# Patient Record
Sex: MALE | Race: WHITE | Employment: OTHER | URBAN - METROPOLITAN AREA
[De-identification: names, ages, dates, MRNs, and addresses within clinical notes are randomized per-mention and may not be internally consistent; named-entity substitution may affect disease eponyms.]

---

## 2017-09-11 ENCOUNTER — HOSPITAL ENCOUNTER (OUTPATIENT)
Age: 67
Discharge: HOME | End: 2017-09-11
Payer: MEDICARE

## 2017-09-11 LAB
ALBUMIN SERPL-MCNC: 4.4 G/DL (ref 3.5–5.2)
ALBUMIN/GLOBULIN RATIO: ABNORMAL (ref 1–2.5)
ALP BLD-CCNC: 59 U/L (ref 40–129)
ALT SERPL-CCNC: 7 U/L (ref 5–41)
ANION GAP SERPL CALCULATED.3IONS-SCNC: 15 MMOL/L (ref 9–17)
AST SERPL-CCNC: 13 U/L
BILIRUB SERPL-MCNC: 0.28 MG/DL (ref 0.3–1.2)
BUN BLDV-MCNC: 16 MG/DL (ref 8–23)
BUN/CREAT BLD: ABNORMAL (ref 9–20)
CALCIUM SERPL-MCNC: 9.5 MG/DL (ref 8.6–10.4)
CHLORIDE BLD-SCNC: 102 MMOL/L (ref 98–107)
CO2: 25 MMOL/L (ref 20–31)
CREAT SERPL-MCNC: 0.61 MG/DL (ref 0.7–1.2)
GFR AFRICAN AMERICAN: >60 ML/MIN
GFR NON-AFRICAN AMERICAN: >60 ML/MIN
GFR SERPL CREATININE-BSD FRML MDRD: ABNORMAL ML/MIN/{1.73_M2}
GFR SERPL CREATININE-BSD FRML MDRD: ABNORMAL ML/MIN/{1.73_M2}
GLUCOSE BLD-MCNC: 112 MG/DL (ref 70–99)
POTASSIUM SERPL-SCNC: 4.2 MMOL/L (ref 3.7–5.3)
SODIUM BLD-SCNC: 142 MMOL/L (ref 135–144)
TOTAL PROTEIN: 7.1 G/DL (ref 6.4–8.3)

## 2017-09-11 PROCEDURE — 36415 COLL VENOUS BLD VENIPUNCTURE: CPT

## 2017-09-11 PROCEDURE — 80053 COMPREHEN METABOLIC PANEL: CPT

## 2017-09-17 ENCOUNTER — APPOINTMENT (OUTPATIENT)
Dept: GENERAL RADIOLOGY | Age: 67
End: 2017-09-17
Payer: MEDICARE

## 2017-09-17 ENCOUNTER — HOSPITAL ENCOUNTER (OUTPATIENT)
Age: 67
Setting detail: OBSERVATION
Discharge: HOME | End: 2017-09-18
Attending: EMERGENCY MEDICINE | Admitting: INTERNAL MEDICINE
Payer: MEDICARE

## 2017-09-17 DIAGNOSIS — R07.9 CHEST PAIN, UNSPECIFIED TYPE: Primary | ICD-10-CM

## 2017-09-17 DIAGNOSIS — N17.9 AKI (ACUTE KIDNEY INJURY) (HCC): ICD-10-CM

## 2017-09-17 LAB
ABSOLUTE EOS #: 0.3 K/UL (ref 0–0.4)
ABSOLUTE LYMPH #: 2.7 K/UL (ref 1–4.8)
ABSOLUTE MONO #: 1.1 K/UL (ref 0.1–1.3)
ALBUMIN SERPL-MCNC: 4.6 G/DL (ref 3.5–5.2)
ALBUMIN/GLOBULIN RATIO: ABNORMAL (ref 1–2.5)
ALP BLD-CCNC: 63 U/L (ref 40–129)
ALT SERPL-CCNC: 7 U/L (ref 5–41)
ANION GAP SERPL CALCULATED.3IONS-SCNC: 17 MMOL/L (ref 9–17)
AST SERPL-CCNC: 12 U/L
BASOPHILS # BLD: 1 %
BASOPHILS ABSOLUTE: 0.1 K/UL (ref 0–0.2)
BILIRUB SERPL-MCNC: 0.53 MG/DL (ref 0.3–1.2)
BUN BLDV-MCNC: 32 MG/DL (ref 8–23)
BUN/CREAT BLD: ABNORMAL (ref 9–20)
CALCIUM SERPL-MCNC: 10.3 MG/DL (ref 8.6–10.4)
CHLORIDE BLD-SCNC: 104 MMOL/L (ref 98–107)
CO2: 24 MMOL/L (ref 20–31)
CREAT SERPL-MCNC: 1.53 MG/DL (ref 0.7–1.2)
DIFFERENTIAL TYPE: ABNORMAL
EOSINOPHILS RELATIVE PERCENT: 3 %
GFR AFRICAN AMERICAN: 55 ML/MIN
GFR NON-AFRICAN AMERICAN: 46 ML/MIN
GFR SERPL CREATININE-BSD FRML MDRD: ABNORMAL ML/MIN/{1.73_M2}
GFR SERPL CREATININE-BSD FRML MDRD: ABNORMAL ML/MIN/{1.73_M2}
GLUCOSE BLD-MCNC: 202 MG/DL (ref 75–110)
GLUCOSE BLD-MCNC: 99 MG/DL (ref 70–99)
HCT VFR BLD CALC: 44.6 % (ref 41–53)
HEMOGLOBIN: 15.3 G/DL (ref 13.5–17.5)
INR BLD: 1
LIPASE: 52 U/L (ref 13–60)
LYMPHOCYTES # BLD: 29 %
MCH RBC QN AUTO: 31.4 PG (ref 26–34)
MCHC RBC AUTO-ENTMCNC: 34.3 G/DL (ref 31–37)
MCV RBC AUTO: 91.6 FL (ref 80–100)
MONOCYTES # BLD: 11 %
PARTIAL THROMBOPLASTIN TIME: 25 SEC (ref 23–31)
PDW BLD-RTO: 15.6 % (ref 11.5–14.9)
PLATELET # BLD: 236 K/UL (ref 150–450)
PLATELET ESTIMATE: ABNORMAL
PMV BLD AUTO: 7.9 FL (ref 6–12)
POTASSIUM SERPL-SCNC: 4.1 MMOL/L (ref 3.7–5.3)
PROTHROMBIN TIME: 11 SEC (ref 9.7–12)
RBC # BLD: 4.87 M/UL (ref 4.5–5.9)
RBC # BLD: ABNORMAL 10*6/UL
SEG NEUTROPHILS: 56 %
SEGMENTED NEUTROPHILS ABSOLUTE COUNT: 5.4 K/UL (ref 1.3–9.1)
SODIUM BLD-SCNC: 145 MMOL/L (ref 135–144)
TOTAL PROTEIN: 7.5 G/DL (ref 6.4–8.3)
TROPONIN INTERP: NORMAL
TROPONIN T: <0.03 NG/ML
WBC # BLD: 9.6 K/UL (ref 3.5–11)
WBC # BLD: ABNORMAL 10*3/UL

## 2017-09-17 PROCEDURE — G0378 HOSPITAL OBSERVATION PER HR: HCPCS

## 2017-09-17 PROCEDURE — 85730 THROMBOPLASTIN TIME PARTIAL: CPT

## 2017-09-17 PROCEDURE — 99285 EMERGENCY DEPT VISIT HI MDM: CPT

## 2017-09-17 PROCEDURE — 85610 PROTHROMBIN TIME: CPT

## 2017-09-17 PROCEDURE — 93005 ELECTROCARDIOGRAM TRACING: CPT

## 2017-09-17 PROCEDURE — 85025 COMPLETE CBC W/AUTO DIFF WBC: CPT

## 2017-09-17 PROCEDURE — 6370000000 HC RX 637 (ALT 250 FOR IP): Performed by: INTERNAL MEDICINE

## 2017-09-17 PROCEDURE — 36415 COLL VENOUS BLD VENIPUNCTURE: CPT

## 2017-09-17 PROCEDURE — 71010 XR CHEST PORTABLE: CPT

## 2017-09-17 PROCEDURE — 84484 ASSAY OF TROPONIN QUANT: CPT

## 2017-09-17 PROCEDURE — 82947 ASSAY GLUCOSE BLOOD QUANT: CPT

## 2017-09-17 PROCEDURE — 6370000000 HC RX 637 (ALT 250 FOR IP): Performed by: EMERGENCY MEDICINE

## 2017-09-17 PROCEDURE — 2580000003 HC RX 258: Performed by: EMERGENCY MEDICINE

## 2017-09-17 PROCEDURE — 83690 ASSAY OF LIPASE: CPT

## 2017-09-17 PROCEDURE — 2580000003 HC RX 258: Performed by: INTERNAL MEDICINE

## 2017-09-17 PROCEDURE — 80053 COMPREHEN METABOLIC PANEL: CPT

## 2017-09-17 RX ORDER — 0.9 % SODIUM CHLORIDE 0.9 %
1000 INTRAVENOUS SOLUTION INTRAVENOUS ONCE
Status: COMPLETED | OUTPATIENT
Start: 2017-09-17 | End: 2017-09-17

## 2017-09-17 RX ORDER — GABAPENTIN 100 MG/1
100 CAPSULE ORAL 3 TIMES DAILY
Status: DISCONTINUED | OUTPATIENT
Start: 2017-09-17 | End: 2017-09-17

## 2017-09-17 RX ORDER — ASPIRIN 81 MG/1
324 TABLET, CHEWABLE ORAL ONCE
Status: COMPLETED | OUTPATIENT
Start: 2017-09-17 | End: 2017-09-17

## 2017-09-17 RX ORDER — ALBUTEROL SULFATE 90 UG/1
2 AEROSOL, METERED RESPIRATORY (INHALATION) EVERY 4 HOURS PRN
Status: DISCONTINUED | OUTPATIENT
Start: 2017-09-17 | End: 2017-09-18 | Stop reason: HOSPADM

## 2017-09-17 RX ORDER — SODIUM CHLORIDE 0.9 % (FLUSH) 0.9 %
10 SYRINGE (ML) INJECTION EVERY 12 HOURS SCHEDULED
Status: DISCONTINUED | OUTPATIENT
Start: 2017-09-17 | End: 2017-09-18 | Stop reason: HOSPADM

## 2017-09-17 RX ORDER — GABAPENTIN 400 MG/1
800 CAPSULE ORAL NIGHTLY
Status: DISCONTINUED | OUTPATIENT
Start: 2017-09-17 | End: 2017-09-18 | Stop reason: HOSPADM

## 2017-09-17 RX ORDER — DEXTROSE MONOHYDRATE 50 MG/ML
100 INJECTION, SOLUTION INTRAVENOUS PRN
Status: DISCONTINUED | OUTPATIENT
Start: 2017-09-17 | End: 2017-09-18 | Stop reason: HOSPADM

## 2017-09-17 RX ORDER — GEMFIBROZIL 600 MG/1
600 TABLET, FILM COATED ORAL 2 TIMES DAILY
Status: DISCONTINUED | OUTPATIENT
Start: 2017-09-17 | End: 2017-09-18 | Stop reason: HOSPADM

## 2017-09-17 RX ORDER — NITROGLYCERIN 0.4 MG/1
0.4 TABLET SUBLINGUAL EVERY 5 MIN PRN
Status: DISCONTINUED | OUTPATIENT
Start: 2017-09-17 | End: 2017-09-18 | Stop reason: HOSPADM

## 2017-09-17 RX ORDER — ACETAMINOPHEN 325 MG/1
650 TABLET ORAL EVERY 4 HOURS PRN
Status: DISCONTINUED | OUTPATIENT
Start: 2017-09-17 | End: 2017-09-18 | Stop reason: HOSPADM

## 2017-09-17 RX ORDER — ALBUTEROL SULFATE 2.5 MG/3ML
2.5 SOLUTION RESPIRATORY (INHALATION) EVERY 6 HOURS PRN
Status: DISCONTINUED | OUTPATIENT
Start: 2017-09-17 | End: 2017-09-18 | Stop reason: HOSPADM

## 2017-09-17 RX ORDER — ONDANSETRON 4 MG/1
4 TABLET, ORALLY DISINTEGRATING ORAL EVERY 8 HOURS PRN
Status: DISCONTINUED | OUTPATIENT
Start: 2017-09-17 | End: 2017-09-18 | Stop reason: HOSPADM

## 2017-09-17 RX ORDER — NICOTINE POLACRILEX 4 MG
15 LOZENGE BUCCAL PRN
Status: DISCONTINUED | OUTPATIENT
Start: 2017-09-17 | End: 2017-09-18 | Stop reason: HOSPADM

## 2017-09-17 RX ORDER — LISINOPRIL 10 MG/1
10 TABLET ORAL DAILY
Status: DISCONTINUED | OUTPATIENT
Start: 2017-09-18 | End: 2017-09-18

## 2017-09-17 RX ORDER — GABAPENTIN 300 MG/1
300 CAPSULE ORAL 2 TIMES DAILY
Status: DISCONTINUED | OUTPATIENT
Start: 2017-09-17 | End: 2017-09-17

## 2017-09-17 RX ORDER — TAMSULOSIN HYDROCHLORIDE 0.4 MG/1
0.4 CAPSULE ORAL DAILY
Status: DISCONTINUED | OUTPATIENT
Start: 2017-09-18 | End: 2017-09-18

## 2017-09-17 RX ORDER — SODIUM CHLORIDE 0.9 % (FLUSH) 0.9 %
10 SYRINGE (ML) INJECTION PRN
Status: DISCONTINUED | OUTPATIENT
Start: 2017-09-17 | End: 2017-09-18 | Stop reason: HOSPADM

## 2017-09-17 RX ORDER — ATENOLOL 50 MG/1
50 TABLET ORAL DAILY
Status: DISCONTINUED | OUTPATIENT
Start: 2017-09-18 | End: 2017-09-18

## 2017-09-17 RX ORDER — DEXTROSE MONOHYDRATE 25 G/50ML
12.5 INJECTION, SOLUTION INTRAVENOUS PRN
Status: DISCONTINUED | OUTPATIENT
Start: 2017-09-17 | End: 2017-09-18 | Stop reason: HOSPADM

## 2017-09-17 RX ORDER — GABAPENTIN 400 MG/1
400 CAPSULE ORAL DAILY
Status: DISCONTINUED | OUTPATIENT
Start: 2017-09-18 | End: 2017-09-18 | Stop reason: HOSPADM

## 2017-09-17 RX ORDER — SODIUM CHLORIDE 9 MG/ML
INJECTION, SOLUTION INTRAVENOUS CONTINUOUS
Status: DISCONTINUED | OUTPATIENT
Start: 2017-09-17 | End: 2017-09-18 | Stop reason: HOSPADM

## 2017-09-17 RX ADMIN — TAMSULOSIN HYDROCHLORIDE 0.4 MG: 0.4 CAPSULE ORAL at 23:50

## 2017-09-17 RX ADMIN — SODIUM CHLORIDE 1000 ML: 9 INJECTION, SOLUTION INTRAVENOUS at 18:45

## 2017-09-17 RX ADMIN — GABAPENTIN 800 MG: 400 CAPSULE ORAL at 23:49

## 2017-09-17 RX ADMIN — ASPIRIN 81 MG 324 MG: 81 TABLET ORAL at 18:20

## 2017-09-17 RX ADMIN — GEMFIBROZIL 600 MG: 600 TABLET, FILM COATED ORAL at 23:49

## 2017-09-17 RX ADMIN — SODIUM CHLORIDE: 9 INJECTION, SOLUTION INTRAVENOUS at 23:53

## 2017-09-17 RX ADMIN — ACETAMINOPHEN 650 MG: 325 TABLET ORAL at 23:54

## 2017-09-17 ASSESSMENT — ENCOUNTER SYMPTOMS
ABDOMINAL PAIN: 0
GASTROINTESTINAL NEGATIVE: 1
NAUSEA: 0
SHORTNESS OF BREATH: 0
COUGH: 0
BACK PAIN: 0
EYES NEGATIVE: 1
VOMITING: 0

## 2017-09-17 ASSESSMENT — PAIN SCALES - GENERAL
PAINLEVEL_OUTOF10: 0
PAINLEVEL_OUTOF10: 7

## 2017-09-18 ENCOUNTER — APPOINTMENT (OUTPATIENT)
Dept: NUCLEAR MEDICINE | Age: 67
End: 2017-09-18
Payer: MEDICARE

## 2017-09-18 VITALS
BODY MASS INDEX: 34.29 KG/M2 | HEIGHT: 71 IN | HEART RATE: 56 BPM | SYSTOLIC BLOOD PRESSURE: 125 MMHG | WEIGHT: 244.93 LBS | DIASTOLIC BLOOD PRESSURE: 80 MMHG | OXYGEN SATURATION: 98 % | RESPIRATION RATE: 16 BRPM | TEMPERATURE: 98.1 F

## 2017-09-18 LAB
EKG ATRIAL RATE: 62 BPM
EKG P AXIS: 79 DEGREES
EKG P-R INTERVAL: 278 MS
EKG Q-T INTERVAL: 406 MS
EKG QRS DURATION: 90 MS
EKG QTC CALCULATION (BAZETT): 412 MS
EKG R AXIS: 53 DEGREES
EKG T AXIS: 45 DEGREES
EKG VENTRICULAR RATE: 62 BPM
GLUCOSE BLD-MCNC: 102 MG/DL (ref 75–110)
GLUCOSE BLD-MCNC: 208 MG/DL (ref 75–110)
GLUCOSE BLD-MCNC: 87 MG/DL (ref 75–110)
LV EF: 60 %
LVEF MODALITY: NORMAL
TROPONIN INTERP: NORMAL
TROPONIN T: <0.03 NG/ML

## 2017-09-18 PROCEDURE — G0378 HOSPITAL OBSERVATION PER HR: HCPCS

## 2017-09-18 PROCEDURE — 96372 THER/PROPH/DIAG INJ SC/IM: CPT

## 2017-09-18 PROCEDURE — 2580000003 HC RX 258: Performed by: INTERNAL MEDICINE

## 2017-09-18 PROCEDURE — 36415 COLL VENOUS BLD VENIPUNCTURE: CPT

## 2017-09-18 PROCEDURE — 78452 HT MUSCLE IMAGE SPECT MULT: CPT

## 2017-09-18 PROCEDURE — 84484 ASSAY OF TROPONIN QUANT: CPT

## 2017-09-18 PROCEDURE — 6360000002 HC RX W HCPCS: Performed by: INTERNAL MEDICINE

## 2017-09-18 PROCEDURE — A9500 TC99M SESTAMIBI: HCPCS | Performed by: INTERNAL MEDICINE

## 2017-09-18 PROCEDURE — 82947 ASSAY GLUCOSE BLOOD QUANT: CPT

## 2017-09-18 PROCEDURE — 3430000000 HC RX DIAGNOSTIC RADIOPHARMACEUTICAL: Performed by: INTERNAL MEDICINE

## 2017-09-18 PROCEDURE — 93017 CV STRESS TEST TRACING ONLY: CPT

## 2017-09-18 PROCEDURE — 99220 PR INITIAL OBSERVATION CARE/DAY 70 MINUTES: CPT | Performed by: INTERNAL MEDICINE

## 2017-09-18 PROCEDURE — 6370000000 HC RX 637 (ALT 250 FOR IP): Performed by: INTERNAL MEDICINE

## 2017-09-18 RX ORDER — AMINOPHYLLINE DIHYDRATE 25 MG/ML
100 INJECTION, SOLUTION INTRAVENOUS
Status: DISCONTINUED | OUTPATIENT
Start: 2017-09-18 | End: 2017-09-18 | Stop reason: HOSPADM

## 2017-09-18 RX ORDER — ATENOLOL 50 MG/1
50 TABLET ORAL NIGHTLY
Status: DISCONTINUED | OUTPATIENT
Start: 2017-09-18 | End: 2017-09-18 | Stop reason: HOSPADM

## 2017-09-18 RX ORDER — NITROGLYCERIN 0.4 MG/1
0.4 TABLET SUBLINGUAL EVERY 5 MIN PRN
Status: ACTIVE | OUTPATIENT
Start: 2017-09-18 | End: 2017-09-18

## 2017-09-18 RX ORDER — ATENOLOL 50 MG/1
50 TABLET ORAL NIGHTLY
Status: DISCONTINUED | OUTPATIENT
Start: 2017-09-18 | End: 2017-09-18

## 2017-09-18 RX ORDER — SODIUM CHLORIDE 9 MG/ML
INJECTION, SOLUTION INTRAVENOUS ONCE
Status: DISCONTINUED | OUTPATIENT
Start: 2017-09-18 | End: 2017-09-18 | Stop reason: HOSPADM

## 2017-09-18 RX ORDER — METOPROLOL TARTRATE 5 MG/5ML
2.5 INJECTION INTRAVENOUS PRN
Status: ACTIVE | OUTPATIENT
Start: 2017-09-18 | End: 2017-09-18

## 2017-09-18 RX ORDER — LISINOPRIL 10 MG/1
10 TABLET ORAL DAILY
Status: DISCONTINUED | OUTPATIENT
Start: 2017-09-19 | End: 2017-09-18 | Stop reason: HOSPADM

## 2017-09-18 RX ORDER — SODIUM CHLORIDE 0.9 % (FLUSH) 0.9 %
10 SYRINGE (ML) INJECTION PRN
Status: ACTIVE | OUTPATIENT
Start: 2017-09-18 | End: 2017-09-18

## 2017-09-18 RX ORDER — TAMSULOSIN HYDROCHLORIDE 0.4 MG/1
0.4 CAPSULE ORAL NIGHTLY
Status: DISCONTINUED | OUTPATIENT
Start: 2017-09-18 | End: 2017-09-18 | Stop reason: HOSPADM

## 2017-09-18 RX ADMIN — GABAPENTIN 400 MG: 400 CAPSULE ORAL at 10:38

## 2017-09-18 RX ADMIN — REGADENOSON 0.4 MG: 0.08 INJECTION, SOLUTION INTRAVENOUS at 09:21

## 2017-09-18 RX ADMIN — ATENOLOL 50 MG: 50 TABLET ORAL at 00:00

## 2017-09-18 RX ADMIN — TETRAKIS(2-METHOXYISOBUTYLISOCYANIDE)COPPER(I) TETRAFLUOROBORATE 16.8 MILLICURIE: 1 INJECTION, POWDER, LYOPHILIZED, FOR SOLUTION INTRAVENOUS at 07:21

## 2017-09-18 RX ADMIN — ONDANSETRON 4 MG: 4 TABLET, ORALLY DISINTEGRATING ORAL at 00:25

## 2017-09-18 RX ADMIN — Medication 10 ML: at 08:45

## 2017-09-18 RX ADMIN — ENOXAPARIN SODIUM 30 MG: 30 INJECTION SUBCUTANEOUS at 00:09

## 2017-09-18 RX ADMIN — Medication 10 ML: at 09:21

## 2017-09-18 RX ADMIN — Medication 10 ML: at 07:22

## 2017-09-18 RX ADMIN — INSULIN LISPRO 1 UNITS: 100 INJECTION, SOLUTION INTRAVENOUS; SUBCUTANEOUS at 00:09

## 2017-09-18 RX ADMIN — GEMFIBROZIL 600 MG: 600 TABLET, FILM COATED ORAL at 10:38

## 2017-09-18 RX ADMIN — TETRAKIS(2-METHOXYISOBUTYLISOCYANIDE)COPPER(I) TETRAFLUOROBORATE 35.2 MILLICURIE: 1 INJECTION, POWDER, LYOPHILIZED, FOR SOLUTION INTRAVENOUS at 09:41

## 2017-09-18 ASSESSMENT — PAIN SCALES - GENERAL
PAINLEVEL_OUTOF10: 0
PAINLEVEL_OUTOF10: 0

## 2018-05-17 ENCOUNTER — APPOINTMENT (OUTPATIENT)
Dept: CARDIAC CATH/INVASIVE PROCEDURES | Age: 68
DRG: 287 | End: 2018-05-17
Payer: MEDICARE

## 2018-05-17 ENCOUNTER — APPOINTMENT (OUTPATIENT)
Dept: GENERAL RADIOLOGY | Age: 68
DRG: 287 | End: 2018-05-17
Payer: MEDICARE

## 2018-05-17 ENCOUNTER — HOSPITAL ENCOUNTER (INPATIENT)
Age: 68
LOS: 1 days | Discharge: HOME | DRG: 287 | End: 2018-05-18
Attending: EMERGENCY MEDICINE | Admitting: INTERNAL MEDICINE
Payer: MEDICARE

## 2018-05-17 DIAGNOSIS — I48.92 ATRIAL FLUTTER, UNSPECIFIED TYPE (HCC): ICD-10-CM

## 2018-05-17 DIAGNOSIS — R07.9 CHEST PAIN, UNSPECIFIED TYPE: Primary | ICD-10-CM

## 2018-05-17 PROBLEM — R07.89 OTHER CHEST PAIN: Status: ACTIVE | Noted: 2018-05-17

## 2018-05-17 LAB
ABSOLUTE EOS #: 0.46 K/UL (ref 0–0.44)
ABSOLUTE IMMATURE GRANULOCYTE: 0.03 K/UL (ref 0–0.3)
ABSOLUTE LYMPH #: 3.29 K/UL (ref 1.1–3.7)
ABSOLUTE MONO #: 0.89 K/UL (ref 0.1–1.2)
ANION GAP SERPL CALCULATED.3IONS-SCNC: 11 MMOL/L (ref 9–17)
BASOPHILS # BLD: 0 % (ref 0–2)
BASOPHILS ABSOLUTE: 0.03 K/UL (ref 0–0.2)
BUN BLDV-MCNC: 27 MG/DL (ref 8–23)
BUN/CREAT BLD: ABNORMAL (ref 9–20)
CALCIUM SERPL-MCNC: 8.6 MG/DL (ref 8.6–10.4)
CHLORIDE BLD-SCNC: 102 MMOL/L (ref 98–107)
CO2: 24 MMOL/L (ref 20–31)
CREAT SERPL-MCNC: 1.08 MG/DL (ref 0.7–1.2)
DIFFERENTIAL TYPE: ABNORMAL
EKG ATRIAL RATE: 277 BPM
EKG P AXIS: -101 DEGREES
EKG Q-T INTERVAL: 460 MS
EKG QRS DURATION: 94 MS
EKG QTC CALCULATION (BAZETT): 482 MS
EKG R AXIS: -51 DEGREES
EKG T AXIS: 27 DEGREES
EKG VENTRICULAR RATE: 66 BPM
EOSINOPHILS RELATIVE PERCENT: 5 % (ref 1–4)
GFR AFRICAN AMERICAN: >60 ML/MIN
GFR NON-AFRICAN AMERICAN: >60 ML/MIN
GFR SERPL CREATININE-BSD FRML MDRD: ABNORMAL ML/MIN/{1.73_M2}
GFR SERPL CREATININE-BSD FRML MDRD: ABNORMAL ML/MIN/{1.73_M2}
GLUCOSE BLD-MCNC: 101 MG/DL (ref 75–110)
GLUCOSE BLD-MCNC: 140 MG/DL (ref 70–99)
GLUCOSE BLD-MCNC: 156 MG/DL (ref 75–110)
GLUCOSE BLD-MCNC: 178 MG/DL (ref 75–110)
GLUCOSE BLD-MCNC: 81 MG/DL (ref 75–110)
HCT VFR BLD CALC: 43.5 % (ref 40.7–50.3)
HEMOGLOBIN: 14.2 G/DL (ref 13–17)
IMMATURE GRANULOCYTES: 0 %
INR BLD: 1.1
LV EF: 50 %
LVEF MODALITY: NORMAL
LYMPHOCYTES # BLD: 34 % (ref 24–43)
MCH RBC QN AUTO: 29.9 PG (ref 25.2–33.5)
MCHC RBC AUTO-ENTMCNC: 32.6 G/DL (ref 28.4–34.8)
MCV RBC AUTO: 91.6 FL (ref 82.6–102.9)
MONOCYTES # BLD: 9 % (ref 3–12)
NRBC AUTOMATED: 0 PER 100 WBC
PARTIAL THROMBOPLASTIN TIME: 21.3 SEC (ref 20.5–30.5)
PARTIAL THROMBOPLASTIN TIME: 30.3 SEC (ref 20.5–30.5)
PDW BLD-RTO: 13.5 % (ref 11.8–14.4)
PLATELET # BLD: 209 K/UL (ref 138–453)
PLATELET ESTIMATE: ABNORMAL
PMV BLD AUTO: 9.8 FL (ref 8.1–13.5)
POC TROPONIN I: 0.01 NG/ML (ref 0–0.1)
POC TROPONIN INTERP: NORMAL
POTASSIUM SERPL-SCNC: 3.4 MMOL/L (ref 3.7–5.3)
PROTHROMBIN TIME: 11.4 SEC (ref 9–12)
RBC # BLD: 4.75 M/UL (ref 4.21–5.77)
RBC # BLD: ABNORMAL 10*6/UL
SEG NEUTROPHILS: 52 % (ref 36–65)
SEGMENTED NEUTROPHILS ABSOLUTE COUNT: 5.11 K/UL (ref 1.5–8.1)
SODIUM BLD-SCNC: 137 MMOL/L (ref 135–144)
TROPONIN INTERP: NORMAL
TROPONIN INTERP: NORMAL
TROPONIN T: <0.03 NG/ML
TROPONIN T: <0.03 NG/ML
WBC # BLD: 9.8 K/UL (ref 3.5–11.3)
WBC # BLD: ABNORMAL 10*3/UL

## 2018-05-17 PROCEDURE — 36415 COLL VENOUS BLD VENIPUNCTURE: CPT

## 2018-05-17 PROCEDURE — 2580000003 HC RX 258: Performed by: NURSE PRACTITIONER

## 2018-05-17 PROCEDURE — 6370000000 HC RX 637 (ALT 250 FOR IP): Performed by: INTERNAL MEDICINE

## 2018-05-17 PROCEDURE — C1760 CLOSURE DEV, VASC: HCPCS

## 2018-05-17 PROCEDURE — 6360000002 HC RX W HCPCS: Performed by: EMERGENCY MEDICINE

## 2018-05-17 PROCEDURE — 6360000004 HC RX CONTRAST MEDICATION

## 2018-05-17 PROCEDURE — 6370000000 HC RX 637 (ALT 250 FOR IP): Performed by: NURSE PRACTITIONER

## 2018-05-17 PROCEDURE — 85025 COMPLETE CBC W/AUTO DIFF WBC: CPT

## 2018-05-17 PROCEDURE — B2111ZZ FLUOROSCOPY OF MULTIPLE CORONARY ARTERIES USING LOW OSMOLAR CONTRAST: ICD-10-PCS | Performed by: INTERNAL MEDICINE

## 2018-05-17 PROCEDURE — 93458 L HRT ARTERY/VENTRICLE ANGIO: CPT | Performed by: INTERNAL MEDICINE

## 2018-05-17 PROCEDURE — C1894 INTRO/SHEATH, NON-LASER: HCPCS

## 2018-05-17 PROCEDURE — 6370000000 HC RX 637 (ALT 250 FOR IP)

## 2018-05-17 PROCEDURE — 99285 EMERGENCY DEPT VISIT HI MDM: CPT

## 2018-05-17 PROCEDURE — 6360000002 HC RX W HCPCS

## 2018-05-17 PROCEDURE — 2580000003 HC RX 258: Performed by: EMERGENCY MEDICINE

## 2018-05-17 PROCEDURE — 80048 BASIC METABOLIC PNL TOTAL CA: CPT

## 2018-05-17 PROCEDURE — 93005 ELECTROCARDIOGRAM TRACING: CPT

## 2018-05-17 PROCEDURE — B2151ZZ FLUOROSCOPY OF LEFT HEART USING LOW OSMOLAR CONTRAST: ICD-10-PCS | Performed by: INTERNAL MEDICINE

## 2018-05-17 PROCEDURE — 71045 X-RAY EXAM CHEST 1 VIEW: CPT

## 2018-05-17 PROCEDURE — 93306 TTE W/DOPPLER COMPLETE: CPT

## 2018-05-17 PROCEDURE — 99222 1ST HOSP IP/OBS MODERATE 55: CPT | Performed by: INTERNAL MEDICINE

## 2018-05-17 PROCEDURE — 85610 PROTHROMBIN TIME: CPT

## 2018-05-17 PROCEDURE — 84484 ASSAY OF TROPONIN QUANT: CPT

## 2018-05-17 PROCEDURE — C1725 CATH, TRANSLUMIN NON-LASER: HCPCS

## 2018-05-17 PROCEDURE — 1200000000 HC SEMI PRIVATE

## 2018-05-17 PROCEDURE — 99406 BEHAV CHNG SMOKING 3-10 MIN: CPT

## 2018-05-17 PROCEDURE — 6360000002 HC RX W HCPCS: Performed by: INTERNAL MEDICINE

## 2018-05-17 PROCEDURE — 4A023N7 MEASUREMENT OF CARDIAC SAMPLING AND PRESSURE, LEFT HEART, PERCUTANEOUS APPROACH: ICD-10-PCS | Performed by: INTERNAL MEDICINE

## 2018-05-17 PROCEDURE — C1769 GUIDE WIRE: HCPCS

## 2018-05-17 PROCEDURE — 85730 THROMBOPLASTIN TIME PARTIAL: CPT

## 2018-05-17 PROCEDURE — 82947 ASSAY GLUCOSE BLOOD QUANT: CPT

## 2018-05-17 PROCEDURE — 2709999900 HC NON-CHARGEABLE SUPPLY

## 2018-05-17 PROCEDURE — 96374 THER/PROPH/DIAG INJ IV PUSH: CPT

## 2018-05-17 RX ORDER — GEMFIBROZIL 600 MG/1
600 TABLET, FILM COATED ORAL 2 TIMES DAILY
Status: DISCONTINUED | OUTPATIENT
Start: 2018-05-17 | End: 2018-05-18 | Stop reason: HOSPADM

## 2018-05-17 RX ORDER — ONDANSETRON 2 MG/ML
4 INJECTION INTRAMUSCULAR; INTRAVENOUS EVERY 6 HOURS PRN
Status: DISCONTINUED | OUTPATIENT
Start: 2018-05-17 | End: 2018-05-18 | Stop reason: HOSPADM

## 2018-05-17 RX ORDER — HEPARIN SODIUM 1000 [USP'U]/ML
4000 INJECTION, SOLUTION INTRAVENOUS; SUBCUTANEOUS ONCE
Status: COMPLETED | OUTPATIENT
Start: 2018-05-17 | End: 2018-05-17

## 2018-05-17 RX ORDER — LISINOPRIL 10 MG/1
10 TABLET ORAL DAILY
Status: DISCONTINUED | OUTPATIENT
Start: 2018-05-17 | End: 2018-05-18 | Stop reason: HOSPADM

## 2018-05-17 RX ORDER — GABAPENTIN 300 MG/1
300 CAPSULE ORAL 2 TIMES DAILY
Status: DISCONTINUED | OUTPATIENT
Start: 2018-05-17 | End: 2018-05-17

## 2018-05-17 RX ORDER — POTASSIUM CHLORIDE 20 MEQ/1
40 TABLET, EXTENDED RELEASE ORAL PRN
Status: DISCONTINUED | OUTPATIENT
Start: 2018-05-17 | End: 2018-05-18 | Stop reason: HOSPADM

## 2018-05-17 RX ORDER — HYDROCODONE BITARTRATE AND ACETAMINOPHEN 5; 325 MG/1; MG/1
1 TABLET ORAL EVERY 4 HOURS PRN
Status: DISCONTINUED | OUTPATIENT
Start: 2018-05-17 | End: 2018-05-18 | Stop reason: HOSPADM

## 2018-05-17 RX ORDER — HEPARIN SODIUM 1000 [USP'U]/ML
4000 INJECTION, SOLUTION INTRAVENOUS; SUBCUTANEOUS PRN
Status: DISCONTINUED | OUTPATIENT
Start: 2018-05-17 | End: 2018-05-17

## 2018-05-17 RX ORDER — POTASSIUM CHLORIDE 20MEQ/15ML
40 LIQUID (ML) ORAL PRN
Status: DISCONTINUED | OUTPATIENT
Start: 2018-05-17 | End: 2018-05-18 | Stop reason: HOSPADM

## 2018-05-17 RX ORDER — NICOTINE POLACRILEX 4 MG
15 LOZENGE BUCCAL PRN
Status: DISCONTINUED | OUTPATIENT
Start: 2018-05-17 | End: 2018-05-18 | Stop reason: HOSPADM

## 2018-05-17 RX ORDER — MAGNESIUM SULFATE 1 G/100ML
1 INJECTION INTRAVENOUS PRN
Status: DISCONTINUED | OUTPATIENT
Start: 2018-05-17 | End: 2018-05-18 | Stop reason: HOSPADM

## 2018-05-17 RX ORDER — ATENOLOL 50 MG/1
50 TABLET ORAL NIGHTLY
Status: DISCONTINUED | OUTPATIENT
Start: 2018-05-17 | End: 2018-05-18 | Stop reason: HOSPADM

## 2018-05-17 RX ORDER — MORPHINE SULFATE 4 MG/ML
2 INJECTION, SOLUTION INTRAMUSCULAR; INTRAVENOUS
Status: DISCONTINUED | OUTPATIENT
Start: 2018-05-17 | End: 2018-05-18 | Stop reason: HOSPADM

## 2018-05-17 RX ORDER — HEPARIN SODIUM 10000 [USP'U]/100ML
1000 INJECTION, SOLUTION INTRAVENOUS CONTINUOUS
Status: DISCONTINUED | OUTPATIENT
Start: 2018-05-17 | End: 2018-05-17

## 2018-05-17 RX ORDER — SODIUM CHLORIDE 0.9 % (FLUSH) 0.9 %
10 SYRINGE (ML) INJECTION EVERY 12 HOURS SCHEDULED
Status: DISCONTINUED | OUTPATIENT
Start: 2018-05-17 | End: 2018-05-18 | Stop reason: HOSPADM

## 2018-05-17 RX ORDER — SODIUM CHLORIDE 9 MG/ML
INJECTION, SOLUTION INTRAVENOUS CONTINUOUS
Status: CANCELLED | OUTPATIENT
Start: 2018-05-17 | End: 2018-05-20

## 2018-05-17 RX ORDER — TAMSULOSIN HYDROCHLORIDE 0.4 MG/1
0.4 CAPSULE ORAL DAILY
Status: DISCONTINUED | OUTPATIENT
Start: 2018-05-17 | End: 2018-05-17

## 2018-05-17 RX ORDER — SODIUM CHLORIDE 0.9 % (FLUSH) 0.9 %
10 SYRINGE (ML) INJECTION EVERY 12 HOURS SCHEDULED
Status: CANCELLED | OUTPATIENT
Start: 2018-05-17

## 2018-05-17 RX ORDER — SODIUM CHLORIDE 0.9 % (FLUSH) 0.9 %
10 SYRINGE (ML) INJECTION PRN
Status: DISCONTINUED | OUTPATIENT
Start: 2018-05-17 | End: 2018-05-18 | Stop reason: HOSPADM

## 2018-05-17 RX ORDER — POTASSIUM CHLORIDE 7.45 MG/ML
10 INJECTION INTRAVENOUS PRN
Status: DISCONTINUED | OUTPATIENT
Start: 2018-05-17 | End: 2018-05-18 | Stop reason: HOSPADM

## 2018-05-17 RX ORDER — TAMSULOSIN HYDROCHLORIDE 0.4 MG/1
0.4 CAPSULE ORAL 2 TIMES DAILY
Status: DISCONTINUED | OUTPATIENT
Start: 2018-05-17 | End: 2018-05-18 | Stop reason: HOSPADM

## 2018-05-17 RX ORDER — BISACODYL 10 MG
10 SUPPOSITORY, RECTAL RECTAL DAILY PRN
Status: DISCONTINUED | OUTPATIENT
Start: 2018-05-17 | End: 2018-05-18 | Stop reason: HOSPADM

## 2018-05-17 RX ORDER — SODIUM CHLORIDE 9 MG/ML
INJECTION, SOLUTION INTRAVENOUS CONTINUOUS
Status: DISCONTINUED | OUTPATIENT
Start: 2018-05-17 | End: 2018-05-18

## 2018-05-17 RX ORDER — GABAPENTIN 400 MG/1
400 CAPSULE ORAL 2 TIMES DAILY
Status: DISCONTINUED | OUTPATIENT
Start: 2018-05-17 | End: 2018-05-18 | Stop reason: HOSPADM

## 2018-05-17 RX ORDER — ATENOLOL 50 MG/1
50 TABLET ORAL DAILY
Status: DISCONTINUED | OUTPATIENT
Start: 2018-05-17 | End: 2018-05-17

## 2018-05-17 RX ORDER — ACETAMINOPHEN 325 MG/1
650 TABLET ORAL EVERY 4 HOURS PRN
Status: DISCONTINUED | OUTPATIENT
Start: 2018-05-17 | End: 2018-05-17 | Stop reason: SDUPTHER

## 2018-05-17 RX ORDER — HEPARIN SODIUM 1000 [USP'U]/ML
4000 INJECTION, SOLUTION INTRAVENOUS; SUBCUTANEOUS PRN
Status: DISCONTINUED | OUTPATIENT
Start: 2018-05-17 | End: 2018-05-18

## 2018-05-17 RX ORDER — HEPARIN SODIUM 1000 [USP'U]/ML
2000 INJECTION, SOLUTION INTRAVENOUS; SUBCUTANEOUS PRN
Status: DISCONTINUED | OUTPATIENT
Start: 2018-05-17 | End: 2018-05-17

## 2018-05-17 RX ORDER — SODIUM CHLORIDE 0.9 % (FLUSH) 0.9 %
10 SYRINGE (ML) INJECTION PRN
Status: CANCELLED | OUTPATIENT
Start: 2018-05-17

## 2018-05-17 RX ORDER — GABAPENTIN 400 MG/1
400 CAPSULE ORAL 2 TIMES DAILY
Status: DISCONTINUED | OUTPATIENT
Start: 2018-05-18 | End: 2018-05-17

## 2018-05-17 RX ORDER — MORPHINE SULFATE 4 MG/ML
4 INJECTION, SOLUTION INTRAMUSCULAR; INTRAVENOUS
Status: DISCONTINUED | OUTPATIENT
Start: 2018-05-17 | End: 2018-05-18 | Stop reason: HOSPADM

## 2018-05-17 RX ORDER — HEPARIN SODIUM 1000 [USP'U]/ML
2000 INJECTION, SOLUTION INTRAVENOUS; SUBCUTANEOUS PRN
Status: DISCONTINUED | OUTPATIENT
Start: 2018-05-17 | End: 2018-05-18

## 2018-05-17 RX ORDER — 0.9 % SODIUM CHLORIDE 0.9 %
500 INTRAVENOUS SOLUTION INTRAVENOUS ONCE
Status: COMPLETED | OUTPATIENT
Start: 2018-05-17 | End: 2018-05-17

## 2018-05-17 RX ORDER — DEXTROSE MONOHYDRATE 50 MG/ML
100 INJECTION, SOLUTION INTRAVENOUS PRN
Status: DISCONTINUED | OUTPATIENT
Start: 2018-05-17 | End: 2018-05-18 | Stop reason: HOSPADM

## 2018-05-17 RX ORDER — ASPIRIN 81 MG/1
81 TABLET, CHEWABLE ORAL DAILY
Status: DISCONTINUED | OUTPATIENT
Start: 2018-05-17 | End: 2018-05-18 | Stop reason: HOSPADM

## 2018-05-17 RX ORDER — VARENICLINE TARTRATE 0.5 MG/1
1 TABLET, FILM COATED ORAL 2 TIMES DAILY
Status: DISCONTINUED | OUTPATIENT
Start: 2018-05-17 | End: 2018-05-18 | Stop reason: HOSPADM

## 2018-05-17 RX ORDER — TAMSULOSIN HYDROCHLORIDE 0.4 MG/1
0.4 CAPSULE ORAL 2 TIMES DAILY
Status: DISCONTINUED | OUTPATIENT
Start: 2018-05-18 | End: 2018-05-17

## 2018-05-17 RX ORDER — DEXTROSE MONOHYDRATE 25 G/50ML
12.5 INJECTION, SOLUTION INTRAVENOUS PRN
Status: DISCONTINUED | OUTPATIENT
Start: 2018-05-17 | End: 2018-05-18 | Stop reason: HOSPADM

## 2018-05-17 RX ORDER — DOCUSATE SODIUM 100 MG/1
100 CAPSULE, LIQUID FILLED ORAL 2 TIMES DAILY
Status: DISCONTINUED | OUTPATIENT
Start: 2018-05-17 | End: 2018-05-18 | Stop reason: HOSPADM

## 2018-05-17 RX ORDER — ACETAMINOPHEN 325 MG/1
650 TABLET ORAL EVERY 4 HOURS PRN
Status: CANCELLED | OUTPATIENT
Start: 2018-05-17

## 2018-05-17 RX ORDER — NITROGLYCERIN 0.4 MG/1
0.4 TABLET SUBLINGUAL EVERY 5 MIN PRN
Status: DISCONTINUED | OUTPATIENT
Start: 2018-05-17 | End: 2018-05-18 | Stop reason: HOSPADM

## 2018-05-17 RX ORDER — ACETAMINOPHEN 325 MG/1
650 TABLET ORAL EVERY 4 HOURS PRN
Status: DISCONTINUED | OUTPATIENT
Start: 2018-05-17 | End: 2018-05-18 | Stop reason: HOSPADM

## 2018-05-17 RX ADMIN — GEMFIBROZIL 600 MG: 600 TABLET ORAL at 21:00

## 2018-05-17 RX ADMIN — GEMFIBROZIL 600 MG: 600 TABLET ORAL at 09:15

## 2018-05-17 RX ADMIN — VARENICLINE TARTRATE 1 MG: 0.5 TABLET, FILM COATED ORAL at 21:00

## 2018-05-17 RX ADMIN — HEPARIN SODIUM 2000 UNITS: 1000 INJECTION, SOLUTION INTRAVENOUS; SUBCUTANEOUS at 12:19

## 2018-05-17 RX ADMIN — DOCUSATE SODIUM 100 MG: 100 CAPSULE ORAL at 09:14

## 2018-05-17 RX ADMIN — HEPARIN SODIUM AND DEXTROSE 10 ML/HR: 10000; 5 INJECTION INTRAVENOUS at 11:30

## 2018-05-17 RX ADMIN — SODIUM CHLORIDE: 9 INJECTION, SOLUTION INTRAVENOUS at 04:56

## 2018-05-17 RX ADMIN — Medication 10 ML: at 21:03

## 2018-05-17 RX ADMIN — SODIUM CHLORIDE 500 ML: 9 INJECTION, SOLUTION INTRAVENOUS at 02:33

## 2018-05-17 RX ADMIN — METFORMIN HYDROCHLORIDE 500 MG: 500 TABLET ORAL at 09:14

## 2018-05-17 RX ADMIN — TAMSULOSIN HYDROCHLORIDE 0.4 MG: 0.4 CAPSULE ORAL at 09:14

## 2018-05-17 RX ADMIN — HEPARIN SODIUM 4000 UNITS: 1000 INJECTION, SOLUTION INTRAVENOUS; SUBCUTANEOUS at 02:49

## 2018-05-17 RX ADMIN — HEPARIN SODIUM AND DEXTROSE 1000 UNITS/HR: 10000; 5 INJECTION INTRAVENOUS at 02:50

## 2018-05-17 RX ADMIN — TAMSULOSIN HYDROCHLORIDE 0.4 MG: 0.4 CAPSULE ORAL at 22:54

## 2018-05-17 RX ADMIN — INSULIN LISPRO 1 UNITS: 100 INJECTION, SOLUTION INTRAVENOUS; SUBCUTANEOUS at 21:16

## 2018-05-17 RX ADMIN — ATENOLOL 50 MG: 50 TABLET ORAL at 21:00

## 2018-05-17 RX ADMIN — HYDROCODONE BITARTRATE AND ACETAMINOPHEN 1 TABLET: 5; 325 TABLET ORAL at 16:46

## 2018-05-17 RX ADMIN — GABAPENTIN 300 MG: 300 CAPSULE ORAL at 09:15

## 2018-05-17 RX ADMIN — GABAPENTIN 400 MG: 400 CAPSULE ORAL at 22:53

## 2018-05-17 RX ADMIN — MINERAL OIL, PETROLATUM 2 EACH: 425; 568 OINTMENT OPHTHALMIC at 09:15

## 2018-05-17 RX ADMIN — HYDROCODONE BITARTRATE AND ACETAMINOPHEN 1 TABLET: 5; 325 TABLET ORAL at 05:00

## 2018-05-17 RX ADMIN — MINERAL OIL, PETROLATUM 2 EACH: 425; 568 OINTMENT OPHTHALMIC at 21:01

## 2018-05-17 RX ADMIN — DOCUSATE SODIUM 100 MG: 100 CAPSULE ORAL at 21:00

## 2018-05-17 RX ADMIN — LISINOPRIL 10 MG: 10 TABLET ORAL at 09:14

## 2018-05-17 RX ADMIN — HYDROCODONE BITARTRATE AND ACETAMINOPHEN 1 TABLET: 5; 325 TABLET ORAL at 22:57

## 2018-05-17 ASSESSMENT — PAIN SCALES - GENERAL
PAINLEVEL_OUTOF10: 0
PAINLEVEL_OUTOF10: 0
PAINLEVEL_OUTOF10: 7
PAINLEVEL_OUTOF10: 8
PAINLEVEL_OUTOF10: 6
PAINLEVEL_OUTOF10: 8
PAINLEVEL_OUTOF10: 9

## 2018-05-17 ASSESSMENT — PAIN DESCRIPTION - ORIENTATION
ORIENTATION: RIGHT;LEFT
ORIENTATION: MID

## 2018-05-17 ASSESSMENT — ENCOUNTER SYMPTOMS
GASTROINTESTINAL NEGATIVE: 1
EYES NEGATIVE: 1
ALLERGIC/IMMUNOLOGIC NEGATIVE: 1
RESPIRATORY NEGATIVE: 1

## 2018-05-17 ASSESSMENT — PAIN DESCRIPTION - LOCATION
LOCATION: SHOULDER
LOCATION: CHEST
LOCATION: CHEST

## 2018-05-17 ASSESSMENT — PAIN DESCRIPTION - PAIN TYPE
TYPE: ACUTE PAIN
TYPE: ACUTE PAIN
TYPE: CHRONIC PAIN

## 2018-05-17 ASSESSMENT — PAIN DESCRIPTION - FREQUENCY: FREQUENCY: CONTINUOUS

## 2018-05-17 ASSESSMENT — PAIN DESCRIPTION - DESCRIPTORS: DESCRIPTORS: ACHING;CONSTANT

## 2018-05-18 VITALS
SYSTOLIC BLOOD PRESSURE: 154 MMHG | BODY MASS INDEX: 30.9 KG/M2 | RESPIRATION RATE: 18 BRPM | TEMPERATURE: 97.4 F | HEIGHT: 71 IN | HEART RATE: 59 BPM | WEIGHT: 220.68 LBS | OXYGEN SATURATION: 98 % | DIASTOLIC BLOOD PRESSURE: 52 MMHG

## 2018-05-18 PROBLEM — R07.89 OTHER CHEST PAIN: Status: RESOLVED | Noted: 2018-05-17 | Resolved: 2018-05-18

## 2018-05-18 LAB
ALBUMIN SERPL-MCNC: 3.3 G/DL (ref 3.5–5.2)
ALBUMIN/GLOBULIN RATIO: 1.2 (ref 1–2.5)
ALP BLD-CCNC: 52 U/L (ref 40–129)
ALT SERPL-CCNC: 7 U/L (ref 5–41)
ANION GAP SERPL CALCULATED.3IONS-SCNC: 9 MMOL/L (ref 9–17)
AST SERPL-CCNC: 15 U/L
BILIRUB SERPL-MCNC: 0.24 MG/DL (ref 0.3–1.2)
BNP INTERPRETATION: NORMAL
BUN BLDV-MCNC: 13 MG/DL (ref 8–23)
BUN/CREAT BLD: ABNORMAL (ref 9–20)
CALCIUM SERPL-MCNC: 8.1 MG/DL (ref 8.6–10.4)
CHLORIDE BLD-SCNC: 109 MMOL/L (ref 98–107)
CHOLESTEROL/HDL RATIO: 4.6
CHOLESTEROL: 130 MG/DL
CO2: 22 MMOL/L (ref 20–31)
CREAT SERPL-MCNC: 0.6 MG/DL (ref 0.7–1.2)
GFR AFRICAN AMERICAN: >60 ML/MIN
GFR NON-AFRICAN AMERICAN: >60 ML/MIN
GFR SERPL CREATININE-BSD FRML MDRD: ABNORMAL ML/MIN/{1.73_M2}
GFR SERPL CREATININE-BSD FRML MDRD: ABNORMAL ML/MIN/{1.73_M2}
GLUCOSE BLD-MCNC: 101 MG/DL (ref 70–99)
GLUCOSE BLD-MCNC: 114 MG/DL (ref 75–110)
GLUCOSE BLD-MCNC: 116 MG/DL (ref 75–110)
HCT VFR BLD CALC: 42.8 % (ref 40.7–50.3)
HDLC SERPL-MCNC: 28 MG/DL
HEMOGLOBIN: 13.4 G/DL (ref 13–17)
LDL CHOLESTEROL: 84 MG/DL (ref 0–130)
MAGNESIUM: 1.9 MG/DL (ref 1.6–2.6)
MCH RBC QN AUTO: 29.6 PG (ref 25.2–33.5)
MCHC RBC AUTO-ENTMCNC: 31.3 G/DL (ref 28.4–34.8)
MCV RBC AUTO: 94.7 FL (ref 82.6–102.9)
NRBC AUTOMATED: 0 PER 100 WBC
PDW BLD-RTO: 13.1 % (ref 11.8–14.4)
PLATELET # BLD: 177 K/UL (ref 138–453)
PMV BLD AUTO: 9.9 FL (ref 8.1–13.5)
POTASSIUM SERPL-SCNC: 4.3 MMOL/L (ref 3.7–5.3)
PRO-BNP: 137 PG/ML
RBC # BLD: 4.52 M/UL (ref 4.21–5.77)
SODIUM BLD-SCNC: 140 MMOL/L (ref 135–144)
TOTAL PROTEIN: 6 G/DL (ref 6.4–8.3)
TRIGL SERPL-MCNC: 91 MG/DL
VLDLC SERPL CALC-MCNC: ABNORMAL MG/DL (ref 1–30)
WBC # BLD: 7.2 K/UL (ref 3.5–11.3)

## 2018-05-18 PROCEDURE — 6370000000 HC RX 637 (ALT 250 FOR IP): Performed by: INTERNAL MEDICINE

## 2018-05-18 PROCEDURE — 36415 COLL VENOUS BLD VENIPUNCTURE: CPT

## 2018-05-18 PROCEDURE — 99239 HOSP IP/OBS DSCHRG MGMT >30: CPT | Performed by: INTERNAL MEDICINE

## 2018-05-18 PROCEDURE — 82947 ASSAY GLUCOSE BLOOD QUANT: CPT

## 2018-05-18 PROCEDURE — 6370000000 HC RX 637 (ALT 250 FOR IP): Performed by: NURSE PRACTITIONER

## 2018-05-18 PROCEDURE — 80053 COMPREHEN METABOLIC PANEL: CPT

## 2018-05-18 PROCEDURE — 85027 COMPLETE CBC AUTOMATED: CPT

## 2018-05-18 PROCEDURE — 83880 ASSAY OF NATRIURETIC PEPTIDE: CPT

## 2018-05-18 PROCEDURE — 80061 LIPID PANEL: CPT

## 2018-05-18 PROCEDURE — 83735 ASSAY OF MAGNESIUM: CPT

## 2018-05-18 RX ORDER — VARENICLINE TARTRATE 1 MG/1
1 TABLET, FILM COATED ORAL 2 TIMES DAILY
Qty: 60 TABLET | Refills: 1 | Status: SHIPPED | OUTPATIENT
Start: 2018-05-18 | End: 2019-02-20 | Stop reason: SDUPTHER

## 2018-05-18 RX ORDER — ASPIRIN 81 MG/1
81 TABLET, CHEWABLE ORAL DAILY
Qty: 30 TABLET | Refills: 3 | Status: SHIPPED | OUTPATIENT
Start: 2018-05-19 | End: 2019-02-20 | Stop reason: ALTCHOICE

## 2018-05-18 RX ADMIN — TAMSULOSIN HYDROCHLORIDE 0.4 MG: 0.4 CAPSULE ORAL at 08:18

## 2018-05-18 RX ADMIN — DOCUSATE SODIUM 100 MG: 100 CAPSULE ORAL at 08:18

## 2018-05-18 RX ADMIN — MINERAL OIL, PETROLATUM 2 EACH: 425; 568 OINTMENT OPHTHALMIC at 08:22

## 2018-05-18 RX ADMIN — VARENICLINE TARTRATE 1 MG: 0.5 TABLET, FILM COATED ORAL at 08:18

## 2018-05-18 RX ADMIN — LISINOPRIL 10 MG: 10 TABLET ORAL at 08:19

## 2018-05-18 RX ADMIN — ASPIRIN 81 MG: 81 TABLET, CHEWABLE ORAL at 08:18

## 2018-05-18 RX ADMIN — GABAPENTIN 400 MG: 400 CAPSULE ORAL at 08:18

## 2018-05-18 RX ADMIN — GEMFIBROZIL 600 MG: 600 TABLET ORAL at 08:18

## 2018-06-16 ENCOUNTER — HOSPITAL ENCOUNTER (EMERGENCY)
Age: 68
Discharge: HOME | End: 2018-06-16
Attending: EMERGENCY MEDICINE
Payer: MEDICARE

## 2018-06-16 ENCOUNTER — APPOINTMENT (OUTPATIENT)
Dept: GENERAL RADIOLOGY | Age: 68
End: 2018-06-16
Payer: MEDICARE

## 2018-06-16 VITALS
SYSTOLIC BLOOD PRESSURE: 148 MMHG | TEMPERATURE: 98.6 F | BODY MASS INDEX: 31.22 KG/M2 | DIASTOLIC BLOOD PRESSURE: 83 MMHG | RESPIRATION RATE: 14 BRPM | HEIGHT: 71 IN | WEIGHT: 223 LBS | OXYGEN SATURATION: 98 % | HEART RATE: 66 BPM

## 2018-06-16 DIAGNOSIS — I95.9 HYPOTENSION, UNSPECIFIED HYPOTENSION TYPE: ICD-10-CM

## 2018-06-16 DIAGNOSIS — E86.0 DEHYDRATION: Primary | ICD-10-CM

## 2018-06-16 DIAGNOSIS — R19.7 DIARRHEA, UNSPECIFIED TYPE: ICD-10-CM

## 2018-06-16 LAB
-: ABNORMAL
ABSOLUTE EOS #: 0.5 K/UL (ref 0–0.4)
ABSOLUTE IMMATURE GRANULOCYTE: ABNORMAL K/UL (ref 0–0.3)
ABSOLUTE LYMPH #: 3.5 K/UL (ref 1–4.8)
ABSOLUTE MONO #: 0.9 K/UL (ref 0.1–1.3)
ALBUMIN SERPL-MCNC: 4.5 G/DL (ref 3.5–5.2)
ALBUMIN/GLOBULIN RATIO: ABNORMAL (ref 1–2.5)
ALP BLD-CCNC: 70 U/L (ref 40–129)
ALT SERPL-CCNC: 9 U/L (ref 5–41)
AMORPHOUS: ABNORMAL
ANION GAP SERPL CALCULATED.3IONS-SCNC: 16 MMOL/L (ref 9–17)
AST SERPL-CCNC: 13 U/L
BACTERIA: ABNORMAL
BASOPHILS # BLD: 1 % (ref 0–2)
BASOPHILS ABSOLUTE: 0.1 K/UL (ref 0–0.2)
BILIRUB SERPL-MCNC: 0.37 MG/DL (ref 0.3–1.2)
BILIRUBIN URINE: NEGATIVE
BUN BLDV-MCNC: 32 MG/DL (ref 8–23)
BUN/CREAT BLD: ABNORMAL (ref 9–20)
CALCIUM SERPL-MCNC: 9.8 MG/DL (ref 8.6–10.4)
CASTS UA: ABNORMAL /LPF
CHLORIDE BLD-SCNC: 104 MMOL/L (ref 98–107)
CO2: 19 MMOL/L (ref 20–31)
COLOR: YELLOW
COMMENT UA: ABNORMAL
CREAT SERPL-MCNC: 0.89 MG/DL (ref 0.7–1.2)
CRYSTALS, UA: ABNORMAL /HPF
DIFFERENTIAL TYPE: ABNORMAL
EKG ATRIAL RATE: 60 BPM
EKG P AXIS: 69 DEGREES
EKG P-R INTERVAL: 312 MS
EKG Q-T INTERVAL: 412 MS
EKG QRS DURATION: 94 MS
EKG QTC CALCULATION (BAZETT): 412 MS
EKG R AXIS: 28 DEGREES
EKG T AXIS: 30 DEGREES
EKG VENTRICULAR RATE: 60 BPM
EOSINOPHILS RELATIVE PERCENT: 5 % (ref 0–4)
EPITHELIAL CELLS UA: ABNORMAL /HPF
GFR AFRICAN AMERICAN: >60 ML/MIN
GFR NON-AFRICAN AMERICAN: >60 ML/MIN
GFR SERPL CREATININE-BSD FRML MDRD: ABNORMAL ML/MIN/{1.73_M2}
GFR SERPL CREATININE-BSD FRML MDRD: ABNORMAL ML/MIN/{1.73_M2}
GLUCOSE BLD-MCNC: 117 MG/DL (ref 70–99)
GLUCOSE URINE: NEGATIVE
HCT VFR BLD CALC: 44.9 % (ref 41–53)
HEMOGLOBIN: 15.6 G/DL (ref 13.5–17.5)
IMMATURE GRANULOCYTES: ABNORMAL %
KETONES, URINE: NEGATIVE
LEUKOCYTE ESTERASE, URINE: NEGATIVE
LYMPHOCYTES # BLD: 35 % (ref 24–44)
MCH RBC QN AUTO: 30.8 PG (ref 26–34)
MCHC RBC AUTO-ENTMCNC: 34.7 G/DL (ref 31–37)
MCV RBC AUTO: 88.9 FL (ref 80–100)
MONOCYTES # BLD: 10 % (ref 1–7)
MUCUS: ABNORMAL
NITRITE, URINE: NEGATIVE
NRBC AUTOMATED: ABNORMAL PER 100 WBC
OTHER OBSERVATIONS UA: ABNORMAL
PDW BLD-RTO: 14.4 % (ref 11.5–14.9)
PH UA: 5.5 (ref 5–8)
PLATELET # BLD: 231 K/UL (ref 150–450)
PLATELET ESTIMATE: ABNORMAL
PMV BLD AUTO: 8.2 FL (ref 6–12)
POTASSIUM SERPL-SCNC: 4 MMOL/L (ref 3.7–5.3)
PROTEIN UA: NEGATIVE
RBC # BLD: 5.05 M/UL (ref 4.5–5.9)
RBC # BLD: ABNORMAL 10*6/UL
RBC UA: ABNORMAL /HPF
RENAL EPITHELIAL, UA: ABNORMAL /HPF
SEG NEUTROPHILS: 49 % (ref 36–66)
SEGMENTED NEUTROPHILS ABSOLUTE COUNT: 5 K/UL (ref 1.3–9.1)
SODIUM BLD-SCNC: 139 MMOL/L (ref 135–144)
SPECIFIC GRAVITY UA: 1.02 (ref 1–1.03)
TOTAL PROTEIN: 7.8 G/DL (ref 6.4–8.3)
TRICHOMONAS: ABNORMAL
TROPONIN INTERP: NORMAL
TROPONIN INTERP: NORMAL
TROPONIN T: <0.03 NG/ML
TROPONIN T: <0.03 NG/ML
TURBIDITY: CLEAR
URINE HGB: ABNORMAL
UROBILINOGEN, URINE: NORMAL
WBC # BLD: 9.9 K/UL (ref 3.5–11)
WBC # BLD: ABNORMAL 10*3/UL
WBC UA: ABNORMAL /HPF
YEAST: ABNORMAL

## 2018-06-16 PROCEDURE — 93005 ELECTROCARDIOGRAM TRACING: CPT

## 2018-06-16 PROCEDURE — 80053 COMPREHEN METABOLIC PANEL: CPT

## 2018-06-16 PROCEDURE — 99285 EMERGENCY DEPT VISIT HI MDM: CPT

## 2018-06-16 PROCEDURE — 74022 RADEX COMPL AQT ABD SERIES: CPT

## 2018-06-16 PROCEDURE — 6370000000 HC RX 637 (ALT 250 FOR IP): Performed by: EMERGENCY MEDICINE

## 2018-06-16 PROCEDURE — 96361 HYDRATE IV INFUSION ADD-ON: CPT

## 2018-06-16 PROCEDURE — 84484 ASSAY OF TROPONIN QUANT: CPT

## 2018-06-16 PROCEDURE — 81001 URINALYSIS AUTO W/SCOPE: CPT

## 2018-06-16 PROCEDURE — 96360 HYDRATION IV INFUSION INIT: CPT

## 2018-06-16 PROCEDURE — 36415 COLL VENOUS BLD VENIPUNCTURE: CPT

## 2018-06-16 PROCEDURE — 2580000003 HC RX 258: Performed by: EMERGENCY MEDICINE

## 2018-06-16 PROCEDURE — 85025 COMPLETE CBC W/AUTO DIFF WBC: CPT

## 2018-06-16 RX ORDER — IBUPROFEN 800 MG/1
800 TABLET ORAL ONCE
Status: COMPLETED | OUTPATIENT
Start: 2018-06-16 | End: 2018-06-16

## 2018-06-16 RX ORDER — 0.9 % SODIUM CHLORIDE 0.9 %
1000 INTRAVENOUS SOLUTION INTRAVENOUS ONCE
Status: COMPLETED | OUTPATIENT
Start: 2018-06-16 | End: 2018-06-16

## 2018-06-16 RX ADMIN — SODIUM CHLORIDE 1000 ML: 9 INJECTION, SOLUTION INTRAVENOUS at 20:18

## 2018-06-16 RX ADMIN — IBUPROFEN 800 MG: 800 TABLET ORAL at 21:09

## 2018-06-16 ASSESSMENT — ENCOUNTER SYMPTOMS
SORE THROAT: 0
VOMITING: 0
ABDOMINAL PAIN: 0
SHORTNESS OF BREATH: 0
DIARRHEA: 0
BACK PAIN: 0
NAUSEA: 0
EYE PAIN: 0
COUGH: 0

## 2018-06-16 ASSESSMENT — PAIN DESCRIPTION - PAIN TYPE: TYPE: CHRONIC PAIN

## 2018-06-16 ASSESSMENT — PAIN DESCRIPTION - LOCATION: LOCATION: SHOULDER

## 2018-06-16 ASSESSMENT — PAIN DESCRIPTION - ORIENTATION: ORIENTATION: RIGHT;LEFT

## 2018-06-16 ASSESSMENT — PAIN DESCRIPTION - DESCRIPTORS: DESCRIPTORS: ACHING

## 2018-06-16 ASSESSMENT — PAIN SCALES - GENERAL: PAINLEVEL_OUTOF10: 7

## 2018-06-16 ASSESSMENT — PAIN DESCRIPTION - ONSET: ONSET: ON-GOING

## 2019-02-03 ENCOUNTER — OFFICE VISIT (OUTPATIENT)
Dept: FAMILY MEDICINE CLINIC | Age: 69
End: 2019-02-03
Payer: MEDICARE

## 2019-02-03 VITALS
DIASTOLIC BLOOD PRESSURE: 68 MMHG | OXYGEN SATURATION: 98 % | HEART RATE: 62 BPM | HEIGHT: 70 IN | WEIGHT: 227.8 LBS | TEMPERATURE: 98.3 F | SYSTOLIC BLOOD PRESSURE: 132 MMHG | BODY MASS INDEX: 32.61 KG/M2 | RESPIRATION RATE: 16 BRPM

## 2019-02-03 DIAGNOSIS — K04.7 DENTAL ABSCESS: Primary | ICD-10-CM

## 2019-02-03 PROCEDURE — 3017F COLORECTAL CA SCREEN DOC REV: CPT | Performed by: INTERNAL MEDICINE

## 2019-02-03 PROCEDURE — G8599 NO ASA/ANTIPLAT THER USE RNG: HCPCS | Performed by: INTERNAL MEDICINE

## 2019-02-03 PROCEDURE — 1101F PT FALLS ASSESS-DOCD LE1/YR: CPT | Performed by: INTERNAL MEDICINE

## 2019-02-03 PROCEDURE — 4040F PNEUMOC VAC/ADMIN/RCVD: CPT | Performed by: INTERNAL MEDICINE

## 2019-02-03 PROCEDURE — G8484 FLU IMMUNIZE NO ADMIN: HCPCS | Performed by: INTERNAL MEDICINE

## 2019-02-03 PROCEDURE — 1123F ACP DISCUSS/DSCN MKR DOCD: CPT | Performed by: INTERNAL MEDICINE

## 2019-02-03 PROCEDURE — 99213 OFFICE O/P EST LOW 20 MIN: CPT | Performed by: INTERNAL MEDICINE

## 2019-02-03 PROCEDURE — G8427 DOCREV CUR MEDS BY ELIG CLIN: HCPCS | Performed by: INTERNAL MEDICINE

## 2019-02-03 PROCEDURE — G8417 CALC BMI ABV UP PARAM F/U: HCPCS | Performed by: INTERNAL MEDICINE

## 2019-02-03 PROCEDURE — 1036F TOBACCO NON-USER: CPT | Performed by: INTERNAL MEDICINE

## 2019-02-03 RX ORDER — AMOXICILLIN AND CLAVULANATE POTASSIUM 875; 125 MG/1; MG/1
1 TABLET, FILM COATED ORAL 2 TIMES DAILY
Qty: 20 TABLET | Refills: 0 | Status: SHIPPED | OUTPATIENT
Start: 2019-02-03 | End: 2019-02-13

## 2019-02-03 ASSESSMENT — PATIENT HEALTH QUESTIONNAIRE - PHQ9
SUM OF ALL RESPONSES TO PHQ9 QUESTIONS 1 & 2: 0
SUM OF ALL RESPONSES TO PHQ QUESTIONS 1-9: 0
2. FEELING DOWN, DEPRESSED OR HOPELESS: 0
1. LITTLE INTEREST OR PLEASURE IN DOING THINGS: 0
SUM OF ALL RESPONSES TO PHQ QUESTIONS 1-9: 0

## 2019-02-03 ASSESSMENT — ENCOUNTER SYMPTOMS
SHORTNESS OF BREATH: 0
COUGH: 0

## 2019-02-07 LAB
AVERAGE GLUCOSE: NORMAL
CREATININE, URINE: NORMAL
HBA1C MFR BLD: 6 %
MICROALBUMIN/CREAT 24H UR: 2.4 MG/G{CREAT}
MICROALBUMIN/CREAT UR-RTO: NORMAL

## 2019-02-20 PROBLEM — Z79.899 LONG TERM USE OF DRUG: Status: ACTIVE | Noted: 2019-02-20

## 2019-02-20 PROBLEM — N40.0 BENIGN PROSTATIC HYPERPLASIA WITHOUT LOWER URINARY TRACT SYMPTOMS: Status: ACTIVE | Noted: 2019-02-20

## 2019-03-24 ENCOUNTER — HOSPITAL ENCOUNTER (EMERGENCY)
Age: 69
Discharge: HOME | End: 2019-03-24
Attending: EMERGENCY MEDICINE
Payer: MEDICARE

## 2019-03-24 ENCOUNTER — APPOINTMENT (OUTPATIENT)
Dept: GENERAL RADIOLOGY | Age: 69
End: 2019-03-24
Payer: MEDICARE

## 2019-03-24 ENCOUNTER — APPOINTMENT (OUTPATIENT)
Dept: CT IMAGING | Age: 69
End: 2019-03-24
Payer: MEDICARE

## 2019-03-24 VITALS
HEART RATE: 71 BPM | SYSTOLIC BLOOD PRESSURE: 113 MMHG | RESPIRATION RATE: 19 BRPM | TEMPERATURE: 97.6 F | BODY MASS INDEX: 32.07 KG/M2 | HEIGHT: 70 IN | WEIGHT: 224 LBS | DIASTOLIC BLOOD PRESSURE: 74 MMHG | OXYGEN SATURATION: 96 %

## 2019-03-24 DIAGNOSIS — K04.7 DENTAL INFECTION: Primary | ICD-10-CM

## 2019-03-24 LAB
ABSOLUTE EOS #: 0.4 K/UL (ref 0–0.4)
ABSOLUTE IMMATURE GRANULOCYTE: NORMAL K/UL (ref 0–0.3)
ABSOLUTE LYMPH #: 3.3 K/UL (ref 1–4.8)
ABSOLUTE MONO #: 0.7 K/UL (ref 0.1–1.3)
ANION GAP SERPL CALCULATED.3IONS-SCNC: 14 MMOL/L (ref 9–17)
BASOPHILS # BLD: 2 % (ref 0–2)
BASOPHILS ABSOLUTE: 0.1 K/UL (ref 0–0.2)
BUN BLDV-MCNC: 18 MG/DL (ref 8–23)
BUN/CREAT BLD: ABNORMAL (ref 9–20)
CALCIUM SERPL-MCNC: 9.6 MG/DL (ref 8.6–10.4)
CHLORIDE BLD-SCNC: 102 MMOL/L (ref 98–107)
CO2: 23 MMOL/L (ref 20–31)
CREAT SERPL-MCNC: 0.73 MG/DL (ref 0.7–1.2)
DIFFERENTIAL TYPE: NORMAL
EOSINOPHILS RELATIVE PERCENT: 4 % (ref 0–4)
GFR AFRICAN AMERICAN: >60 ML/MIN
GFR NON-AFRICAN AMERICAN: >60 ML/MIN
GFR SERPL CREATININE-BSD FRML MDRD: ABNORMAL ML/MIN/{1.73_M2}
GFR SERPL CREATININE-BSD FRML MDRD: ABNORMAL ML/MIN/{1.73_M2}
GLUCOSE BLD-MCNC: 124 MG/DL (ref 70–99)
HCT VFR BLD CALC: 46 % (ref 41–53)
HEMOGLOBIN: 15.8 G/DL (ref 13.5–17.5)
IMMATURE GRANULOCYTES: NORMAL %
LYMPHOCYTES # BLD: 35 % (ref 24–44)
MCH RBC QN AUTO: 31 PG (ref 26–34)
MCHC RBC AUTO-ENTMCNC: 34.4 G/DL (ref 31–37)
MCV RBC AUTO: 90.1 FL (ref 80–100)
MONOCYTES # BLD: 7 % (ref 1–7)
NRBC AUTOMATED: NORMAL PER 100 WBC
PDW BLD-RTO: 14.4 % (ref 11.5–14.9)
PLATELET # BLD: 222 K/UL (ref 150–450)
PLATELET ESTIMATE: NORMAL
PMV BLD AUTO: 8.5 FL (ref 6–12)
POTASSIUM SERPL-SCNC: 4.1 MMOL/L (ref 3.7–5.3)
RBC # BLD: 5.1 M/UL (ref 4.5–5.9)
RBC # BLD: NORMAL 10*6/UL
SEG NEUTROPHILS: 52 % (ref 36–66)
SEGMENTED NEUTROPHILS ABSOLUTE COUNT: 5.1 K/UL (ref 1.3–9.1)
SODIUM BLD-SCNC: 139 MMOL/L (ref 135–144)
TROPONIN INTERP: NORMAL
TROPONIN INTERP: NORMAL
TROPONIN T: NORMAL NG/ML
TROPONIN T: NORMAL NG/ML
TROPONIN, HIGH SENSITIVITY: 10 NG/L (ref 0–22)
TROPONIN, HIGH SENSITIVITY: 10 NG/L (ref 0–22)
WBC # BLD: 9.6 K/UL (ref 3.5–11)
WBC # BLD: NORMAL 10*3/UL

## 2019-03-24 PROCEDURE — 84484 ASSAY OF TROPONIN QUANT: CPT

## 2019-03-24 PROCEDURE — 36415 COLL VENOUS BLD VENIPUNCTURE: CPT

## 2019-03-24 PROCEDURE — 99284 EMERGENCY DEPT VISIT MOD MDM: CPT

## 2019-03-24 PROCEDURE — 70490 CT SOFT TISSUE NECK W/O DYE: CPT

## 2019-03-24 PROCEDURE — 80048 BASIC METABOLIC PNL TOTAL CA: CPT

## 2019-03-24 PROCEDURE — 93005 ELECTROCARDIOGRAM TRACING: CPT

## 2019-03-24 PROCEDURE — 6370000000 HC RX 637 (ALT 250 FOR IP): Performed by: EMERGENCY MEDICINE

## 2019-03-24 PROCEDURE — 71046 X-RAY EXAM CHEST 2 VIEWS: CPT

## 2019-03-24 PROCEDURE — 85025 COMPLETE CBC W/AUTO DIFF WBC: CPT

## 2019-03-24 RX ORDER — AMOXICILLIN AND CLAVULANATE POTASSIUM 875; 125 MG/1; MG/1
1 TABLET, FILM COATED ORAL 2 TIMES DAILY
Qty: 14 TABLET | Refills: 0 | Status: SHIPPED | OUTPATIENT
Start: 2019-03-24 | End: 2019-03-31

## 2019-03-24 RX ORDER — AMOXICILLIN AND CLAVULANATE POTASSIUM 875; 125 MG/1; MG/1
1 TABLET, FILM COATED ORAL ONCE
Status: COMPLETED | OUTPATIENT
Start: 2019-03-24 | End: 2019-03-24

## 2019-03-24 RX ADMIN — AMOXICILLIN AND CLAVULANATE POTASSIUM 1 TABLET: 875; 125 TABLET, FILM COATED ORAL at 07:07

## 2019-03-24 ASSESSMENT — PAIN SCALES - GENERAL: PAINLEVEL_OUTOF10: 6

## 2019-03-24 ASSESSMENT — ENCOUNTER SYMPTOMS
SINUS PAIN: 1
CONSTIPATION: 0
SHORTNESS OF BREATH: 1
NAUSEA: 0
ABDOMINAL PAIN: 0
BACK PAIN: 0
DIARRHEA: 0
TROUBLE SWALLOWING: 0
VOMITING: 0
BLOOD IN STOOL: 0
SORE THROAT: 1
COUGH: 1
COLOR CHANGE: 0

## 2019-03-24 ASSESSMENT — PAIN DESCRIPTION - PAIN TYPE: TYPE: ACUTE PAIN

## 2019-03-24 ASSESSMENT — PAIN DESCRIPTION - DESCRIPTORS: DESCRIPTORS: CONSTANT

## 2019-03-24 ASSESSMENT — PAIN DESCRIPTION - LOCATION: LOCATION: NECK;THROAT

## 2019-04-24 ENCOUNTER — HOSPITAL ENCOUNTER (EMERGENCY)
Age: 69
Discharge: HOME | End: 2019-04-24
Attending: EMERGENCY MEDICINE
Payer: MEDICARE

## 2019-04-24 VITALS
WEIGHT: 225 LBS | HEART RATE: 75 BPM | OXYGEN SATURATION: 96 % | BODY MASS INDEX: 31.5 KG/M2 | TEMPERATURE: 98.7 F | DIASTOLIC BLOOD PRESSURE: 72 MMHG | SYSTOLIC BLOOD PRESSURE: 138 MMHG | RESPIRATION RATE: 16 BRPM | HEIGHT: 71 IN

## 2019-04-24 DIAGNOSIS — L50.9 URTICARIA: Primary | ICD-10-CM

## 2019-04-24 PROCEDURE — 99282 EMERGENCY DEPT VISIT SF MDM: CPT

## 2019-04-24 PROCEDURE — 6370000000 HC RX 637 (ALT 250 FOR IP): Performed by: NURSE PRACTITIONER

## 2019-04-24 RX ORDER — FAMOTIDINE 20 MG/1
20 TABLET, FILM COATED ORAL 2 TIMES DAILY
Qty: 14 TABLET | Refills: 0 | Status: SHIPPED | OUTPATIENT
Start: 2019-04-24 | End: 2019-08-22

## 2019-04-24 RX ORDER — FAMOTIDINE 20 MG/1
20 TABLET, FILM COATED ORAL ONCE
Status: COMPLETED | OUTPATIENT
Start: 2019-04-24 | End: 2019-04-24

## 2019-04-24 RX ORDER — PREDNISONE 20 MG/1
40 TABLET ORAL ONCE
Status: COMPLETED | OUTPATIENT
Start: 2019-04-24 | End: 2019-04-24

## 2019-04-24 RX ORDER — DIPHENHYDRAMINE HCL 25 MG
25 TABLET ORAL ONCE
Status: COMPLETED | OUTPATIENT
Start: 2019-04-24 | End: 2019-04-24

## 2019-04-24 RX ORDER — DIPHENHYDRAMINE HCL 25 MG
25 CAPSULE ORAL EVERY 8 HOURS PRN
Qty: 12 CAPSULE | Refills: 0 | Status: SHIPPED | OUTPATIENT
Start: 2019-04-24 | End: 2019-05-01

## 2019-04-24 RX ORDER — PREDNISONE 20 MG/1
40 TABLET ORAL DAILY
Qty: 10 TABLET | Refills: 0 | Status: SHIPPED | OUTPATIENT
Start: 2019-04-24 | End: 2019-04-29

## 2019-04-24 RX ORDER — GABAPENTIN 400 MG/1
400 CAPSULE ORAL DAILY
COMMUNITY
End: 2019-12-05

## 2019-04-24 RX ADMIN — DIPHENHYDRAMINE HCL 25 MG: 25 TABLET ORAL at 18:16

## 2019-04-24 RX ADMIN — PREDNISONE 40 MG: 20 TABLET ORAL at 18:16

## 2019-04-24 RX ADMIN — FAMOTIDINE 20 MG: 20 TABLET ORAL at 18:16

## 2019-04-24 ASSESSMENT — ENCOUNTER SYMPTOMS
NAUSEA: 0
VOMITING: 0
SHORTNESS OF BREATH: 0
COUGH: 0
TROUBLE SWALLOWING: 0

## 2019-04-24 NOTE — ED NOTES
Pt arrives today with c/o rash that he had started about 30 min PTA. PT states he bit off some yellow chocolate off a chocolate bunny he bought and this happened about 30m after that, pt also states he has been taking PCN for a few days for a tooth he had pulled. PT is p,w,d, AL and Ox3, eupneic, pt has welts covering his chest/abd/arms/back.      Charbel Maria, RN  04/24/19 1600 [address] Eugene Rivas RN  04/24/19 [id number]

## 2019-04-24 NOTE — ED PROVIDER NOTES
STRP Testing qd -bid prn, Disp-100 strip, R-11             ALLERGIES     Symbicort [budesonide-formoterol fumarate]; Barium-containing compounds; Dye  [iodides]; Pravastatin; and Statins depletion therapy    FAMILY HISTORY           Problem Relation Age of Onset    Diabetes Other     Heart Disease Other     High Blood Pressure Other     Cancer Other     COPD Other     Arthritis Other      Family Status   Relation Name Status    Other  (Not Specified)      Reviewed and not relevant. SOCIAL HISTORY      reports that he has been smoking cigarettes. He has a 20.00 pack-year smoking history. He has never used smokeless tobacco. He reports that he drinks about 1.2 oz of alcohol per week. He reports that he has current or past drug history. Drug: Marijuana. Reviewed. PHYSICAL EXAM    (up to 7 for level 4, 8 or more for level 5)     ED Triage Vitals [04/24/19 1716]   BP Temp Temp Source Pulse Resp SpO2 Height Weight   138/72 98.7 °F (37.1 °C) Oral 75 -- 96 % 5' 11\" (1.803 m) 225 lb (102.1 kg)       Physical Exam   Constitutional: He is oriented to person, place, and time. He appears well-developed and well-nourished. HENT:   Head: Normocephalic and atraumatic. Right Ear: External ear normal.   Left Ear: External ear normal.   Nose: Nose normal.   Eyes: Right eye exhibits no discharge. Left eye exhibits no discharge. No scleral icterus. Neck: Normal range of motion. Cardiovascular: Normal rate and regular rhythm. Pulmonary/Chest: Effort normal and breath sounds normal. No stridor. No respiratory distress. Musculoskeletal: Normal range of motion. He exhibits no edema. Neurological: He is alert and oriented to person, place, and time. Coordination normal.   Skin: Skin is warm and dry. Rash noted. Rash is urticarial. He is not diaphoretic. Urticarial rash to bilateral upper extremities, chest, abdomen and back. Pruritic. Blanchable. Psychiatric: He has a normal mood and affect.  His behavior is normal.       DIAGNOSTIC RESULTS     RADIOLOGY:   none    LABS:  Labs Reviewed - No data to display    All other labs were within normal range or not returned asof this dictation. EMERGENCYDEPARTMENT COURSE and DIFFERENTIAL DIAGNOSIS/MDM:   Patient presents to ED with complaints of rash and itching after eating some chocolate. Patient was given prednisone, Pepcid and Benadryl which improved his symptoms. No difficulty breathing or swallowing. We will give short course of prednisone, Pepcid and Benadryl for home. Okay to discharge home. Follow-upwith family doctor or clinic of choice in 1 or 2 days for a recheck. Return to ED if any worsening or new symptoms. Vitals:    Vitals:    04/24/19 1716   BP: 138/72   Pulse: 75   Resp: 16   Temp: 98.7 °F (37.1 °C)   TempSrc: Oral   SpO2: 96%   Weight: 225 lb (102.1 kg)   Height: 5' 11\" (1.803 m)       Vitals reviewed. PROCEDURES:  None    FINAL IMPRESSION      1.  Urticaria          DISPOSITION/PLAN   DISPOSITION Decision To Discharge 04/24/2019 06:39:50 PM      PATIENT REFERRED TO:  Sharri Martino, APRN - CNP  [address] [facility]  [address] [facility],[address]  [address] [facility] [address]  [phone]    Schedule an appointment as soon as possible for a visit   Follow up visit    [facility] ED  [place] [address]  [phone]    If symptoms worsen      DISCHARGE MEDICATIONS:  Discharge Medication List as of 4/24/2019  6:40 PM      START taking these medications    Details   predniSONE (DELTASONE) 20 MG tablet Take 2 tablets by mouth daily for 5 days, Disp-10 tablet, R-0Print      famotidine (PEPCID) 20 MG tablet Take 1 tablet by mouth 2 times daily for 7 days, Disp-14 tablet, R-0Print      diphenhydrAMINE (BENADRYL) 25 MG capsule Take 1 capsule by mouth every 8 hours as needed for Itching, Disp-12 capsule, R-0Print             (Please note thatportions of this note were completed with a voice recognition program.  Efforts were made to edit the dictations but occasionally words are mis-transcribed.)    Edmund Sotelo, CHRISTIANA - CHRISTIANA Cross - GAYLA  04/24/19 [id number]

## 2019-05-09 ENCOUNTER — APPOINTMENT (OUTPATIENT)
Dept: CT IMAGING | Age: 69
End: 2019-05-09
Payer: MEDICARE

## 2019-05-09 ENCOUNTER — HOSPITAL ENCOUNTER (EMERGENCY)
Age: 69
Discharge: HOME | End: 2019-05-09
Attending: EMERGENCY MEDICINE
Payer: MEDICARE

## 2019-05-09 VITALS
SYSTOLIC BLOOD PRESSURE: 105 MMHG | WEIGHT: 225 LBS | HEIGHT: 71 IN | DIASTOLIC BLOOD PRESSURE: 79 MMHG | OXYGEN SATURATION: 96 % | TEMPERATURE: 98.2 F | HEART RATE: 62 BPM | BODY MASS INDEX: 31.5 KG/M2 | RESPIRATION RATE: 16 BRPM

## 2019-05-09 DIAGNOSIS — I65.23 BILATERAL CAROTID ARTERY OCCLUSION: ICD-10-CM

## 2019-05-09 DIAGNOSIS — M54.2 NECK PAIN: Primary | ICD-10-CM

## 2019-05-09 LAB
ANION GAP SERPL CALCULATED.3IONS-SCNC: 12 MMOL/L (ref 9–17)
BUN BLDV-MCNC: 15 MG/DL (ref 8–23)
BUN/CREAT BLD: ABNORMAL (ref 9–20)
CALCIUM SERPL-MCNC: 9.2 MG/DL (ref 8.6–10.4)
CHLORIDE BLD-SCNC: 101 MMOL/L (ref 98–107)
CO2: 23 MMOL/L (ref 20–31)
CREAT SERPL-MCNC: 0.53 MG/DL (ref 0.7–1.2)
GFR AFRICAN AMERICAN: >60 ML/MIN
GFR NON-AFRICAN AMERICAN: >60 ML/MIN
GFR SERPL CREATININE-BSD FRML MDRD: ABNORMAL ML/MIN/{1.73_M2}
GFR SERPL CREATININE-BSD FRML MDRD: ABNORMAL ML/MIN/{1.73_M2}
GLUCOSE BLD-MCNC: 113 MG/DL (ref 70–99)
HCT VFR BLD CALC: 43.6 % (ref 41–53)
HEMOGLOBIN: 14.9 G/DL (ref 13.5–17.5)
MCH RBC QN AUTO: 30.3 PG (ref 26–34)
MCHC RBC AUTO-ENTMCNC: 34.1 G/DL (ref 31–37)
MCV RBC AUTO: 88.9 FL (ref 80–100)
NRBC AUTOMATED: NORMAL PER 100 WBC
PDW BLD-RTO: 13.9 % (ref 11.5–14.9)
PLATELET # BLD: 214 K/UL (ref 150–450)
PMV BLD AUTO: 7.3 FL (ref 6–12)
POTASSIUM SERPL-SCNC: 4.2 MMOL/L (ref 3.7–5.3)
RBC # BLD: 4.9 M/UL (ref 4.5–5.9)
SODIUM BLD-SCNC: 136 MMOL/L (ref 135–144)
WBC # BLD: 7.5 K/UL (ref 3.5–11)

## 2019-05-09 PROCEDURE — 96374 THER/PROPH/DIAG INJ IV PUSH: CPT

## 2019-05-09 PROCEDURE — 93880 EXTRACRANIAL BILAT STUDY: CPT

## 2019-05-09 PROCEDURE — 80048 BASIC METABOLIC PNL TOTAL CA: CPT

## 2019-05-09 PROCEDURE — 36415 COLL VENOUS BLD VENIPUNCTURE: CPT

## 2019-05-09 PROCEDURE — 70490 CT SOFT TISSUE NECK W/O DYE: CPT

## 2019-05-09 PROCEDURE — 6360000002 HC RX W HCPCS: Performed by: EMERGENCY MEDICINE

## 2019-05-09 PROCEDURE — 70450 CT HEAD/BRAIN W/O DYE: CPT

## 2019-05-09 PROCEDURE — 99284 EMERGENCY DEPT VISIT MOD MDM: CPT

## 2019-05-09 PROCEDURE — 85027 COMPLETE CBC AUTOMATED: CPT

## 2019-05-09 RX ORDER — CYCLOBENZAPRINE HCL 10 MG
10 TABLET ORAL EVERY 8 HOURS PRN
Qty: 20 TABLET | Refills: 0 | Status: SHIPPED | OUTPATIENT
Start: 2019-05-09 | End: 2019-06-17 | Stop reason: SDUPTHER

## 2019-05-09 RX ORDER — ONDANSETRON 2 MG/ML
4 INJECTION INTRAMUSCULAR; INTRAVENOUS ONCE
Status: COMPLETED | OUTPATIENT
Start: 2019-05-09 | End: 2019-05-09

## 2019-05-09 RX ADMIN — ONDANSETRON 4 MG: 2 INJECTION INTRAMUSCULAR; INTRAVENOUS at 13:07

## 2019-05-09 ASSESSMENT — PAIN DESCRIPTION - LOCATION
LOCATION_2: NECK
LOCATION: HEAD

## 2019-05-09 ASSESSMENT — PAIN SCALES - GENERAL: PAINLEVEL_OUTOF10: 0

## 2019-05-09 ASSESSMENT — PAIN DESCRIPTION - DESCRIPTORS
DESCRIPTORS: SHARP;SHOOTING;PRESSURE
DESCRIPTORS_2: TIGHTNESS

## 2019-05-09 ASSESSMENT — PAIN DESCRIPTION - PAIN TYPE: TYPE: ACUTE PAIN

## 2019-05-09 ASSESSMENT — PAIN DESCRIPTION - FREQUENCY: FREQUENCY: INTERMITTENT

## 2019-05-09 ASSESSMENT — ENCOUNTER SYMPTOMS
SHORTNESS OF BREATH: 0
TROUBLE SWALLOWING: 0
NAUSEA: 0
SORE THROAT: 1
VOMITING: 0
ABDOMINAL PAIN: 0
BACK PAIN: 0

## 2019-05-09 ASSESSMENT — PAIN DESCRIPTION - INTENSITY: RATING_2: 3

## 2019-05-09 NOTE — ED PROVIDER NOTES
[address] ED  eMERGENCY dEPARTMENT eNCOUnter    Pt Name: Nicanor Cisneros  MRN: [id number]  [place] 1950  Date of evaluation: 5/9/19  CHIEF COMPLAINT       Chief Complaint   Patient presents with    Neck Pain    Headache     HISTORY OF PRESENT ILLNESS   HPI  76 y.o. male with history of CAD, atrial flutter, diabetes, presents for evaluation of head pain and neck pain. Patient states he's had neck pain since one month ago when he was diagnosed with a dental abscess. He had this dental abscess drained and had his tooth extracted by a dentist.  His symptoms improved somewhat, but never fully went away. His symptoms have worsened recently and he is complaining of a sharp pain that starts on the left posterior side of his neck shooting up towards his head, very sharp and very intense in nature, worse pain he has experienced, lasting only 20 seconds, then subsiding, although never fully going away. He has a few of these episodes each day, without any clear triggers, not necessarily try to movement. He also tells me that over the last few days he has been \"tasting an infection in my mouth\", so he is concerned he has another dental abscess. He denies seeing or feeling any discharge running down the back of his throat, but he does endorse a sore throat over the last few days. He denies any fevers, trauma, focal weakness. He does endorse tingling of both his legs, but this is long-standing, no acute changes, and he takes gabapentin for it. He was diagnosed with vertigo last month as well, and he states that his vertigo-like symptoms have been getting much better although not yet fully resolved. He denies any difficulty getting around due to the dizziness, and states it has been less concerning to him given the rapid improvement. REVIEW OF SYSTEMS     Review of Systems   Constitutional: Negative for fever. HENT: Positive for sore throat. Negative for trouble swallowing.     Respiratory: Negative for shortness of breath. Cardiovascular: Negative for chest pain. Gastrointestinal: Negative for abdominal pain, nausea and vomiting. Musculoskeletal: Positive for neck pain. Negative for back pain. Skin: Negative for rash. Allergic/Immunologic: Negative for immunocompromised state. Neurological: Positive for dizziness and headaches. Psychiatric/Behavioral: Negative for agitation. PASTMEDICAL HISTORY     Past Medical History:   Diagnosis Date    Atrial flutter ([address] 05/17/2018    CAD (coronary artery disease)     Callus of foot     rt     COPD (chronic obstructive pulmonary disease) (HCC)     GERD (gastroesophageal reflux disease)     H/O cardiovascular stress test 09/2017    Hyperlipidemia     Hypertension     Obesity     Osteoarthritis     Poor compliance     Rotator cuff tear     bilat    Shoulder pain, bilateral     Type II or unspecified type diabetes mellitus without mention of complication, not stated as uncontrolled      SURGICAL HISTORY       Past Surgical History:   Procedure Laterality Date    CARDIAC CATHETERIZATION  05/17/2018    CARDIAC CATHETERIZATION  2011    CORONARY ANGIOPLASTY WITH STENT PLACEMENT  04/23/2009    Mid RCA (L[place]te 3.5 x 32) Proximal RCA (4x20)     CURRENT MEDICATIONS       Discharge Medication List as of 5/9/2019  3:30 PM      CONTINUE these medications which have NOT CHANGED    Details   CHANTIX CONTINUING MONTH JESUS 1 MG tablet take 1 tablet by mouth twice a day, Disp-56 tablet, R-0Please profile for when patient needs refillNormal      gabapentin (NEURONTIN) 400 MG capsule Take 400 mg by mouth daily. Historical Med      AMOXICILLIN PO Take by mouthHistorical Med      famotidine (PEPCID) 20 MG tablet Take 1 tablet by mouth 2 times daily for 7 days, Disp-14 tablet, R-0Print      fluticasone-salmeterol (ADVAIR) 250-50 MCG/DOSE AEPB Inhale 1 puff into the lungs 2 times daily, Disp-1 each, R-5Normal      lisinopril (PRINIVIL;ZESTRIL) 5 MG tablet Take Normocephalic and atraumatic. Right Ear: External ear normal.   Left Ear: External ear normal.   Mouth/Throat: Oropharynx is clear and moist.   Eyes: Pupils are equal, round, and reactive to light. Neck: Normal range of motion. Neck supple. Cardiovascular: Normal rate and regular rhythm. Pulmonary/Chest: Effort normal and breath sounds normal. No respiratory distress. He has no wheezes. Abdominal: Soft. He exhibits no distension. There is no tenderness. Neurological: He is alert and oriented to person, place, and time. He has normal strength. No cranial nerve deficit or sensory deficit. GCS eye subscore is 4. GCS verbal subscore is 5. GCS motor subscore is 6. Skin: Skin is warm. Capillary refill takes less than 2 seconds. Psychiatric: He has a normal mood and affect. Nursing note and vitals reviewed. MEDICAL DECISION MAKING:     Patient presents for evaluation of neck pain and headache pain in the setting of recent dental abscess as well as recent diagnosis of vertigo. His head pain is not typical of subarachnoid hemorrhage despite him describing the pain is severe and the worse of his life, given its very short duration of 20 seconds. It is more concerning for a neuropathic pain. Given his recent abscess and diagnosis of vertigo however, I will CT his head and neck to rule out any deep tissue infection or any intracranial abnormality. If this is negative, he would appear safe for supportive therapy as an outpatient. ED Course as of May 09 2109   Thu May 09, 2019   1357 Unremarkable   WBC: 7.5 [TR]   1357 Unremarkable   Creatinine(!): 0.53 [TR]   1357 No acute intracranial abnormality   CT Head WO Contrast [TR]   1357 No acute abnormality on CT neck   CT SOFT TISSUE NECK WO CONTRAST [TR]      ED Course User Index  [TR] Kendal Mack MD     Bilateral Dopplers of the neck were done to rule out any significant stenosis.   While there is no official read, the left carotid seems to have moderate stenosis worse the right one seems to have more significant stenosis but still with good amount of flow. I communicated these findings to the patient and told him these are unofficial and that he needs to follow-up with his PCP for official duplex findings and further referral likely to vascular surgery for outpatient evaluation to see if he needs any sort of future intervention. While exact cause of his symptoms is unclear, he doesn't appear to have any emergent process at this time and appears stable for discharge home with supportive care. Procedures    DIAGNOSTIC RESULTS   EKG:All EKG's are interpreted by the Emergency Department Physician who either signs or Co-signs this chart in the absence of a cardiologist.      RADIOLOGY:All plain film, CT, MRI, and formal ultrasound images (except ED bedside ultrasound) are read by the radiologist, see reports below, unless otherwisenoted in MDM or here. CT SOFT TISSUE NECK WO CONTRAST   Final Result   No acute abnormality of the soft tissue structures of the neck. Old right nasal bone fracture         CT Head WO Contrast   Final Result   No acute intracranial abnormality. VL Carotid Bilateral    (Results Pending)     LABS: All lab results were reviewed by myself, and all abnormals are listed below.   Labs Reviewed   BASIC METABOLIC PANEL W/ REFLEX TO MG FOR LOW K - Abnormal; Notable for the following components:       Result Value    Glucose 113 (*)     CREATININE 0.53 (*)     All other components within normal limits   CBC     EMERGENCY DEPARTMENTCOURSE:   Vitals:    Vitals:    05/09/19 1207   BP: 105/79   Pulse: 62   Resp: 16   Temp: 98.2 °F (36.8 °C)   TempSrc: Oral   SpO2: 96%   Weight: 225 lb (102.1 kg)   Height: 5' 11\" (1.803 m)       The patient was given the following medications while in the emergency department:  Orders Placed This Encounter   Medications    ondansetron (ZOFRAN) injection 4 mg    cyclobenzaprine (FLEXERIL) 10 MG tablet     Sig: Take 1 tablet by mouth every 8 hours as needed for Muscle spasms     Dispense:  20 tablet     Refill:  0     CONSULTS:  None    FINAL IMPRESSION      1. Neck pain    2. Bilateral carotid artery occlusion          DISPOSITION/PLAN   DISPOSITION Decision To Discharge 05/09/2019 03:28:44 PM      PATIENT REFERRED TO:  CHRISTIANA Chadwick - CNP  [email or web address] [place] [address]  [phone]          DISCHARGE MEDICATIONS:  Discharge Medication List as of 5/9/2019  3:30 PM        Rey Rockwell MD  Attending Emergency Physician  Authy voice recognition software used in portions of this document.                     Rey Rockwell MD  05/09/19 [id number]

## 2019-05-09 NOTE — ED NOTES

## 2019-05-10 ENCOUNTER — CARE COORDINATION (OUTPATIENT)
Dept: CARE COORDINATION | Age: 69
End: 2019-05-10

## 2019-05-10 NOTE — PROGRESS NOTES
Please let patient know that I have reviewed his ED visit from yesterday and I will place a referral for vascular surgeon to evaluate his carotid artery scan for recommendations.

## 2019-05-10 NOTE — CARE COORDINATION
Ambulatory Care Coordination ED Follow up Call       Reason for ED Visit:  Neck pain  Care Management Risk Score: CMRS 10  How are you feeling? :     improved  Patient Reports the following:  Pt has not picked up flexeril, but will do so today. Pt has already made his appt with heart and vascular doctor. He is asking for test results, writer advised pt to reach out to PCP office or follow up with heart and vascular doctor of test  results. Contact [facility] regarding any worsening symptoms from above. Did you call your PCP prior to going to the ED? No          Post Discharge Status:  What health concerns since you left the Emergency Room?  none    Do you have wounds that you are caring for at home? No    Do you have a follow up appt scheduled? yes    Review of Instructions:                                 Do you have any questions regarding your discharge instructions?:  No  Medications:    What questions do you have about your medications? No  Are you taking your medications as directed? If not - why? Yes   Can you afford your medications? yes  ADLS:  Do you need assistance of any kind at home? No   What assistance is needed?  none      FU appts/Provider:    Future Appointments   Date Time Provider Lucio Pascual   6/10/2019 11:00 AM Jeremiah Owens MD [facility] HEART/VAS Valeriano Duttony   6/17/2019  7:30 AM Abisai Alarcon, APRN - [facility]   Topic Date Due    Diabetic retinal exam  12/09/1960    Colon cancer screen colonoscopy  12/09/2000    Diabetic microalbuminuria test  06/18/2013    A1C test (Diabetic or Prediabetic)  05/14/2014    Diabetic foot exam  05/15/2014    Pneumococcal 65+ years Vaccine (1 of 2 - PCV13) 12/09/2015    Lipid screen  05/18/2019     Patient advised to contact PCP office to have HM items/records faxed to PCP Office directly?   N/A

## 2019-05-15 LAB
EKG ATRIAL RATE: 288 BPM
EKG P AXIS: -38 DEGREES
EKG Q-T INTERVAL: 436 MS
EKG QRS DURATION: 84 MS
EKG QTC CALCULATION (BAZETT): 431 MS
EKG R AXIS: 86 DEGREES
EKG T AXIS: 8 DEGREES
EKG VENTRICULAR RATE: 59 BPM

## 2019-06-10 ENCOUNTER — INITIAL CONSULT (OUTPATIENT)
Dept: VASCULAR SURGERY | Age: 69
End: 2019-06-10
Payer: MEDICARE

## 2019-06-10 VITALS
WEIGHT: 228.8 LBS | SYSTOLIC BLOOD PRESSURE: 142 MMHG | TEMPERATURE: 97.7 F | BODY MASS INDEX: 32.03 KG/M2 | DIASTOLIC BLOOD PRESSURE: 82 MMHG | HEART RATE: 65 BPM | HEIGHT: 71 IN

## 2019-06-10 DIAGNOSIS — I65.23 CAROTID STENOSIS, ASYMPTOMATIC, BILATERAL: Primary | ICD-10-CM

## 2019-06-10 PROCEDURE — G8598 ASA/ANTIPLAT THER USED: HCPCS | Performed by: SURGERY

## 2019-06-10 PROCEDURE — G8417 CALC BMI ABV UP PARAM F/U: HCPCS | Performed by: SURGERY

## 2019-06-10 PROCEDURE — 4040F PNEUMOC VAC/ADMIN/RCVD: CPT | Performed by: SURGERY

## 2019-06-10 PROCEDURE — 1123F ACP DISCUSS/DSCN MKR DOCD: CPT | Performed by: SURGERY

## 2019-06-10 PROCEDURE — 99203 OFFICE O/P NEW LOW 30 MIN: CPT | Performed by: SURGERY

## 2019-06-10 PROCEDURE — G8427 DOCREV CUR MEDS BY ELIG CLIN: HCPCS | Performed by: SURGERY

## 2019-06-10 PROCEDURE — 4004F PT TOBACCO SCREEN RCVD TLK: CPT | Performed by: SURGERY

## 2019-06-10 PROCEDURE — 3017F COLORECTAL CA SCREEN DOC REV: CPT | Performed by: SURGERY

## 2019-06-12 ASSESSMENT — ENCOUNTER SYMPTOMS
CHEST TIGHTNESS: 0
ALLERGIC/IMMUNOLOGIC NEGATIVE: 1
COLOR CHANGE: 0
GASTROINTESTINAL NEGATIVE: 1
SHORTNESS OF BREATH: 0

## 2019-06-13 NOTE — PROGRESS NOTES
[budesonide-formoterol fumarate]; Barium-containing compounds; Dye  [iodides]; Pravastatin; and Statins depletion therapy    Medications:      Current Outpatient Medications   Medication Sig Dispense Refill    CHANTIX CONTINUING MONTH JESUS 1 MG tablet take 1 tablet by mouth twice a day 56 tablet 0    lisinopril (PRINIVIL;ZESTRIL) 5 MG tablet Take 1 tablet by mouth daily 30 tablet 3    metFORMIN (GLUCOPHAGE) 500 MG tablet take 1 tablet by mouth twice a day with meals 90 tablet 1    nitroGLYCERIN (NITROSTAT) 0.4 MG SL tablet Place 1 tablet under the tongue every 5 minutes as needed for Chest pain 25 tablet 3    cyclobenzaprine (FLEXERIL) 10 MG tablet Take 1 tablet by mouth every 8 hours as needed for Muscle spasms 20 tablet 0    gabapentin (NEURONTIN) 400 MG capsule Take 400 mg by mouth daily.  AMOXICILLIN PO Take by mouth      famotidine (PEPCID) 20 MG tablet Take 1 tablet by mouth 2 times daily for 7 days 14 tablet 0    fluticasone-salmeterol (ADVAIR) 250-50 MCG/DOSE AEPB Inhale 1 puff into the lungs 2 times daily 1 each 5    Blood Pressure Monitoring (BLOOD PRESSURE CUFF) MISC 1 each by Does not apply route daily as needed (monitoring) 1 each 0    apixaban (ELIQUIS) 5 MG TABS tablet Take 1 tablet by mouth 2 times daily 60 tablet 3    metoprolol tartrate (LOPRESSOR) 25 MG tablet Take 1 tablet by mouth 2 times daily (Patient taking differently: Take 12.5 mg by mouth nightly ) 60 tablet 3    tamsulosin (FLOMAX) 0.4 MG capsule Take 1 capsule by mouth daily. 30 capsule 3    Glucose Blood (BLOOD GLUCOSE TEST STRIPS) STRP Testing qd -bid prn 100 strip 11    gemfibrozil (LOPID) 600 MG tablet Take  by mouth 2 times daily.  albuterol (PROVENTIL) (2.5 MG/3ML) 0.083% nebulizer solution Take 2.5 mg by nebulization every 6 hours as needed. No current facility-administered medications for this visit. Social History:     Tobacco:    reports that he has been smoking cigarettes.   He has a 10.00 pack-year smoking history. He has never used smokeless tobacco.  Alcohol:      reports that he drinks alcohol. Drug Use:  reports that he has current or past drug history. Drug: Marijuana. Review of Systems:     Review of Systems   Constitutional: Negative for chills and fever. Eyes: Negative for visual disturbance. Respiratory: Negative for chest tightness and shortness of breath. Cardiovascular: Negative for chest pain and leg swelling. Gastrointestinal: Negative. Endocrine: Negative. Genitourinary: Negative. Musculoskeletal: Negative. Skin: Negative for color change and wound. Allergic/Immunologic: Negative. Neurological: Positive for dizziness and numbness. Negative for facial asymmetry, speech difficulty and weakness. Hematological: Negative. Psychiatric/Behavioral: Negative. Physical Exam:     Vitals:  BP (!) 142/82   Pulse 65   Temp 97.7 °F (36.5 °C)   Ht 5' 11\" (1.803 m)   Wt 228 lb 12.8 oz (103.8 kg)   BMI 31.91 kg/m²     Physical Exam   Constitutional: He is oriented to person, place, and time. He appears well-developed and well-nourished. Eyes: Conjunctivae and EOM are normal.   Neck: Carotid bruit is not present. Cardiovascular: Normal rate, regular rhythm and normal heart sounds. Pulses:       Radial pulses are 2+ on the right side, and 2+ on the left side. Dorsalis pedis pulses are 2+ on the right side, and 2+ on the left side. Pulmonary/Chest: Effort normal. No respiratory distress. Abdominal: Soft. Normal appearance. Feet:   Right Foot:   Skin Integrity: Negative for ulcer or skin breakdown. Left Foot:   Skin Integrity: Negative for ulcer or skin breakdown. Neurological: He is alert and oriented to person, place, and time. He has normal strength. No sensory deficit. GCS eye subscore is 4. GCS verbal subscore is 5. GCS motor subscore is 6. Skin: Skin is warm and intact. Capillary refill takes less than 2 seconds.    Psychiatric: He

## 2019-07-12 ENCOUNTER — HOSPITAL ENCOUNTER (OUTPATIENT)
Dept: CARDIAC CATH/INVASIVE PROCEDURES | Age: 69
Discharge: HOME | End: 2019-07-12
Payer: MEDICARE

## 2019-07-12 ENCOUNTER — HOSPITAL ENCOUNTER (OUTPATIENT)
Dept: GENERAL RADIOLOGY | Age: 69
Discharge: HOME | End: 2019-07-14
Payer: MEDICARE

## 2019-07-12 VITALS
HEART RATE: 64 BPM | OXYGEN SATURATION: 96 % | SYSTOLIC BLOOD PRESSURE: 124 MMHG | DIASTOLIC BLOOD PRESSURE: 55 MMHG | RESPIRATION RATE: 17 BRPM | BODY MASS INDEX: 31.36 KG/M2 | WEIGHT: 224 LBS | TEMPERATURE: 97.2 F | HEIGHT: 71 IN

## 2019-07-12 DIAGNOSIS — Z95.0 S/P PLACEMENT OF CARDIAC PACEMAKER: ICD-10-CM

## 2019-07-12 LAB
GFR NON-AFRICAN AMERICAN: >60 ML/MIN
GFR SERPL CREATININE-BSD FRML MDRD: >60 ML/MIN
GFR SERPL CREATININE-BSD FRML MDRD: NORMAL ML/MIN/{1.73_M2}
GLUCOSE BLD-MCNC: 119 MG/DL (ref 74–100)
PLATELET # BLD: 229 K/UL (ref 138–453)
POC CHLORIDE: 105 MMOL/L (ref 98–107)
POC CREATININE: 0.57 MG/DL (ref 0.51–1.19)
POC HEMATOCRIT: 42 % (ref 41–53)
POC HEMOGLOBIN: 14.3 G/DL (ref 13.5–17.5)
POC LACTIC ACID: 0.82 MMOL/L (ref 0.56–1.39)
POC POTASSIUM: 4.1 MMOL/L (ref 3.5–4.5)
POC SODIUM: 138 MMOL/L (ref 138–146)

## 2019-07-12 PROCEDURE — 84295 ASSAY OF SERUM SODIUM: CPT

## 2019-07-12 PROCEDURE — 83605 ASSAY OF LACTIC ACID: CPT

## 2019-07-12 PROCEDURE — 2709999900 HC NON-CHARGEABLE SUPPLY

## 2019-07-12 PROCEDURE — 82435 ASSAY OF BLOOD CHLORIDE: CPT

## 2019-07-12 PROCEDURE — 84132 ASSAY OF SERUM POTASSIUM: CPT

## 2019-07-12 PROCEDURE — 71045 X-RAY EXAM CHEST 1 VIEW: CPT

## 2019-07-12 PROCEDURE — 82565 ASSAY OF CREATININE: CPT

## 2019-07-12 PROCEDURE — 6360000002 HC RX W HCPCS

## 2019-07-12 PROCEDURE — 7100000011 HC PHASE II RECOVERY - ADDTL 15 MIN

## 2019-07-12 PROCEDURE — 85014 HEMATOCRIT: CPT

## 2019-07-12 PROCEDURE — 85049 AUTOMATED PLATELET COUNT: CPT

## 2019-07-12 PROCEDURE — C1898 LEAD, PMKR, OTHER THAN TRANS: HCPCS

## 2019-07-12 PROCEDURE — 82947 ASSAY GLUCOSE BLOOD QUANT: CPT

## 2019-07-12 PROCEDURE — C1894 INTRO/SHEATH, NON-LASER: HCPCS

## 2019-07-12 PROCEDURE — 7100000010 HC PHASE II RECOVERY - FIRST 15 MIN

## 2019-07-12 PROCEDURE — 33208 INSRT HEART PM ATRIAL & VENT: CPT | Performed by: INTERNAL MEDICINE

## 2019-07-12 PROCEDURE — 2500000003 HC RX 250 WO HCPCS

## 2019-07-12 PROCEDURE — 93005 ELECTROCARDIOGRAM TRACING: CPT | Performed by: INTERNAL MEDICINE

## 2019-07-12 RX ORDER — SODIUM CHLORIDE 9 MG/ML
INJECTION, SOLUTION INTRAVENOUS CONTINUOUS
Status: DISCONTINUED | OUTPATIENT
Start: 2019-07-12 | End: 2019-07-13 | Stop reason: HOSPADM

## 2019-07-12 RX ORDER — DIPHENHYDRAMINE HYDROCHLORIDE 50 MG/ML
50 INJECTION INTRAMUSCULAR; INTRAVENOUS ONCE
Status: COMPLETED | OUTPATIENT
Start: 2019-07-12 | End: 2019-07-12

## 2019-07-12 RX ORDER — CEFAZOLIN SODIUM 1 G/50ML
1 INJECTION, SOLUTION INTRAVENOUS
Status: DISCONTINUED | OUTPATIENT
Start: 2019-07-12 | End: 2019-07-12 | Stop reason: HOSPADM

## 2019-07-12 RX ADMIN — DIPHENHYDRAMINE HYDROCHLORIDE 50 MG: 50 INJECTION INTRAMUSCULAR; INTRAVENOUS at 10:07

## 2019-07-12 RX ADMIN — SODIUM CHLORIDE: 9 INJECTION, SOLUTION INTRAVENOUS at 07:56

## 2019-07-12 NOTE — PROGRESS NOTES
Dr. Ruby [facility] pt may be discharged at [facility] may be resumed tonight.

## 2019-07-12 NOTE — PROGRESS NOTES
Patient admitted, consent signed, all questions answered. Pt ready for procedure. Call light to reach with side rails up 2 of 2. Lt chest clipped. No one at bedside with patient.

## 2019-07-12 NOTE — H&P
See Dr. Fanny King note from 7/2/19    Sick Sinus Syndrome.     Plan for Dual chambers PPM.    Hoang Thrasher MD

## 2019-07-13 LAB
EKG ATRIAL RATE: 57 BPM
EKG P AXIS: 56 DEGREES
EKG P-R INTERVAL: 330 MS
EKG Q-T INTERVAL: 426 MS
EKG QRS DURATION: 90 MS
EKG QTC CALCULATION (BAZETT): 414 MS
EKG R AXIS: -7 DEGREES
EKG T AXIS: 32 DEGREES
EKG VENTRICULAR RATE: 57 BPM

## 2019-08-15 ENCOUNTER — HOSPITAL ENCOUNTER (OUTPATIENT)
Age: 69
Discharge: HOME | End: 2019-08-17
Payer: MEDICARE

## 2019-08-15 ENCOUNTER — HOSPITAL ENCOUNTER (OUTPATIENT)
Dept: GENERAL RADIOLOGY | Age: 69
Discharge: HOME | End: 2019-08-17
Payer: MEDICARE

## 2019-08-15 DIAGNOSIS — R00.1 BRADYCARDIA: ICD-10-CM

## 2019-08-15 DIAGNOSIS — Z95.0 CARDIAC PACEMAKER IN SITU: ICD-10-CM

## 2019-08-15 PROCEDURE — 71046 X-RAY EXAM CHEST 2 VIEWS: CPT

## 2019-08-23 PROBLEM — Z95.5 PRESENCE OF STENT IN CORONARY ARTERY IN PATIENT WITH CORONARY ARTERY DISEASE: Status: ACTIVE | Noted: 2018-12-06

## 2019-08-23 PROBLEM — I25.10 PRESENCE OF STENT IN CORONARY ARTERY IN PATIENT WITH CORONARY ARTERY DISEASE: Status: ACTIVE | Noted: 2018-12-06

## 2019-09-04 ENCOUNTER — HOSPITAL ENCOUNTER (OUTPATIENT)
Facility: CLINIC | Age: 69
Discharge: HOME | End: 2019-09-06
Payer: MEDICARE

## 2019-09-04 ENCOUNTER — HOSPITAL ENCOUNTER (OUTPATIENT)
Dept: GENERAL RADIOLOGY | Facility: CLINIC | Age: 69
Discharge: HOME | End: 2019-09-06
Payer: MEDICARE

## 2019-09-04 DIAGNOSIS — J44.1 COPD EXACERBATION (HCC): ICD-10-CM

## 2019-09-04 PROCEDURE — 71046 X-RAY EXAM CHEST 2 VIEWS: CPT

## 2019-09-13 ENCOUNTER — CARE COORDINATION (OUTPATIENT)
Dept: CARE COORDINATION | Age: 69
End: 2019-09-13

## 2019-09-13 ASSESSMENT — ENCOUNTER SYMPTOMS: DYSPNEA ASSOCIATED WITH: MINIMAL EXERTION

## 2019-09-13 NOTE — CARE COORDINATION
8/20/19   CHRISTIANA Sosa CNP   gemfibrozil (LOPID) 600 MG tablet Take 1 tablet by mouth 2 times daily 8/20/19   CHRISTIANA Sosa CNP   lisinopril (PRINIVIL;ZESTRIL) 5 MG tablet Take 1 tablet by mouth daily 8/20/19   CHRISTIANA Sosa CNP   varenicline (CHANTIX) 1 MG tablet Take 1 tablet by mouth 2 times daily 6/26/19   CHRISTIANA Sosa CNP   ranolazine (RANEXA) 500 MG extended release tablet Take 500 mg by mouth 2 times daily    Historical Provider, MD   gabapentin (NEURONTIN) 400 MG capsule Take 400 mg by mouth daily. Historical Provider, MD   Blood Pressure Monitoring (BLOOD PRESSURE CUFF) MISC 1 each by Does not apply route daily as needed (monitoring) 2/20/19   CHRISTIANA Sosa CNP   apixaban (ELIQUIS) 5 MG TABS tablet Take 1 tablet by mouth 2 times daily 5/18/18   Dhaval Downing MD   tamsulosin (FLOMAX) 0.4 MG capsule Take 1 capsule by mouth daily. 6/17/13   Kathie Fernando, DO   Glucose Blood (BLOOD GLUCOSE TEST STRIPS) STRP Testing qd -bid prn 5/15/13   Brianna Payan, DO   albuterol (PROVENTIL) (2.5 MG/3ML) 0.083% nebulizer solution Take 2.5 mg by nebulization every 6 hours as needed.       Historical Provider, MD       Future Appointments   Date Time Provider Lucio Pascual   9/26/2019  8:30 AM [facility]     ,   Diabetes Assessment            and   COPD Assessment    Does the patient understand envrionmental exposure?:  Yes  Is the patient able to verbalize Rescue vs. Long Acting medications?:  Yes  Does the patient have a nebulizer?:  Yes  Does the patient use a space with inhaled medications?:  No     No patient-reported symptoms         Symptoms:      Symptom course:  stable  Breathlessness:  minimal exertion  Change in chronic cough?:  Increased  Change in sputum?:  Increased  Sputum characteristics:  Yellow  Self Monitoring - SaO2:  No  Have you had a recent diagnosis of pneumonia either by PCP or at a hospital?:  No

## 2019-09-30 ENCOUNTER — CARE COORDINATION (OUTPATIENT)
Dept: CARE COORDINATION | Age: 69
End: 2019-09-30

## 2019-09-30 SDOH — ECONOMIC STABILITY: TRANSPORTATION INSECURITY
IN THE PAST 12 MONTHS, HAS THE LACK OF TRANSPORTATION KEPT YOU FROM MEDICAL APPOINTMENTS OR FROM GETTING MEDICATIONS?: NO

## 2019-09-30 SDOH — HEALTH STABILITY: MENTAL HEALTH: HOW MANY STANDARD DRINKS CONTAINING ALCOHOL DO YOU HAVE ON A TYPICAL DAY?: 1 OR 2

## 2019-09-30 SDOH — HEALTH STABILITY: MENTAL HEALTH: HOW OFTEN DO YOU HAVE A DRINK CONTAINING ALCOHOL?: 2-3 TIMES A WEEK

## 2019-09-30 SDOH — HEALTH STABILITY: MENTAL HEALTH
STRESS IS WHEN SOMEONE FEELS TENSE, NERVOUS, ANXIOUS, OR CAN'T SLEEP AT NIGHT BECAUSE THEIR MIND IS TROUBLED. HOW STRESSED ARE YOU?: TO SOME EXTENT

## 2019-09-30 SDOH — ECONOMIC STABILITY: INCOME INSECURITY: HOW HARD IS IT FOR YOU TO PAY FOR THE VERY BASICS LIKE FOOD, HOUSING, MEDICAL CARE, AND HEATING?: NOT VERY HARD

## 2019-09-30 SDOH — ECONOMIC STABILITY: FOOD INSECURITY: WITHIN THE PAST 12 MONTHS, THE FOOD YOU BOUGHT JUST DIDN'T LAST AND YOU DIDN'T HAVE MONEY TO GET MORE.: NEVER TRUE

## 2019-09-30 SDOH — ECONOMIC STABILITY: FOOD INSECURITY: WITHIN THE PAST 12 MONTHS, YOU WORRIED THAT YOUR FOOD WOULD RUN OUT BEFORE YOU GOT MONEY TO BUY MORE.: NEVER TRUE

## 2019-09-30 SDOH — ECONOMIC STABILITY: TRANSPORTATION INSECURITY
IN THE PAST 12 MONTHS, HAS LACK OF TRANSPORTATION KEPT YOU FROM MEETINGS, WORK, OR FROM GETTING THINGS NEEDED FOR DAILY LIVING?: NO

## 2019-09-30 ASSESSMENT — ENCOUNTER SYMPTOMS: DYSPNEA ASSOCIATED WITH: EXERTION

## 2019-10-11 ENCOUNTER — CARE COORDINATION (OUTPATIENT)
Dept: CARE COORDINATION | Age: 69
End: 2019-10-11

## 2019-10-14 ENCOUNTER — CARE COORDINATION (OUTPATIENT)
Dept: CARE COORDINATION | Age: 69
End: 2019-10-14

## 2019-10-15 ASSESSMENT — ENCOUNTER SYMPTOMS: DYSPNEA ASSOCIATED WITH: MINIMAL EXERTION

## 2019-11-01 ENCOUNTER — CARE COORDINATION (OUTPATIENT)
Dept: CARE COORDINATION | Age: 69
End: 2019-11-01

## 2019-11-01 ASSESSMENT — ENCOUNTER SYMPTOMS: DYSPNEA ASSOCIATED WITH: EXERTION

## 2019-11-11 ENCOUNTER — HOSPITAL ENCOUNTER (OUTPATIENT)
Dept: PULMONOLOGY | Age: 69
Discharge: HOME | End: 2019-11-11
Payer: MEDICARE

## 2019-11-11 DIAGNOSIS — J43.2 CENTRILOBULAR EMPHYSEMA (HCC): ICD-10-CM

## 2019-11-20 ENCOUNTER — CARE COORDINATION (OUTPATIENT)
Dept: CARE COORDINATION | Age: 69
End: 2019-11-20

## 2019-11-20 ASSESSMENT — ENCOUNTER SYMPTOMS: DYSPNEA ASSOCIATED WITH: EXERTION

## 2019-11-25 ENCOUNTER — CARE COORDINATION (OUTPATIENT)
Dept: CARE COORDINATION | Age: 69
End: 2019-11-25

## 2019-12-05 ENCOUNTER — CARE COORDINATION (OUTPATIENT)
Dept: CARE COORDINATION | Age: 69
End: 2019-12-05

## 2019-12-27 ENCOUNTER — CARE COORDINATION (OUTPATIENT)
Dept: CARE COORDINATION | Age: 69
End: 2019-12-27

## 2019-12-27 ASSESSMENT — ENCOUNTER SYMPTOMS: DYSPNEA ASSOCIATED WITH: EXERTION

## 2020-01-16 ENCOUNTER — CARE COORDINATION (OUTPATIENT)
Dept: CARE COORDINATION | Age: 70
End: 2020-01-16

## 2020-01-16 ASSESSMENT — ENCOUNTER SYMPTOMS: DYSPNEA ASSOCIATED WITH: MINIMAL EXERTION

## 2020-01-16 NOTE — CARE COORDINATION
Ambulatory Care Coordination Note  1/16/2020  CM Risk Score: 10  Charlson 10 Year Mortality Risk Score: 47%     ACC: Princess Armas, SADE    Summary Note: Spoke with Naif Dunn for CM F/U of COPD and DM. Naif Dunn denies issues with his blood sugars. He still checks them only randomly and said they are never higher than 140. Naif Dunn had a CO2 leak in his home and has been short of breath since this occurred a few days ago. He insists he is getting better, but while talking he is having a hard time catching his breath in the beginning. He eventually sounded like he was breathing easier. RNCM offered to get him into the office but he declined and said he is on the mend and will call if he gets any worse. RNCM reinforced same day visits and to not let it get too bad. Naif Dunn states understanding. CM PLAN: F/U PC in 2 weeks to check on COPD, DM. Care Coordination Interventions    Program Enrollment:  Complex Care  Referral from Primary Care Provider:  No  Suggested Interventions and Community Resources  Diabetes Education:  Declined  Pulmonary Rehab:  Completed (Comment: order sent, brochure mailed to home)  Registered Dietician:  Declined  Zone Management Tools:  Completed (Comment: COPD, DM zones)         Goals Addressed                 This Visit's Progress     Conditions and Symptoms   On track     I will schedule office visits, as directed by my provider. I will keep my appointment or reschedule if I have to cancel. I will notify my provider of any barriers to my plan of care. I will follow my Zone Management tool to seek urgent or emergent care. I will notify my provider of any symptoms that indicate a worsening of my condition. Barriers: lack of motivation and lack of education  Plan for overcoming my barriers: Care Manager   Confidence: 8/10  Anticipated Goal Completion Date: 12/13/2019              Prior to Admission medications    Medication Sig Start Date End Date Taking?  Authorizing Provider   lisinopril (PRINIVIL;ZESTRIL) 5 MG tablet Take 1 tablet by mouth 2 times daily 1/3/20   Dewey Doris, CHRISTIANA - CNP   apixaban (ELIQUIS) 5 MG TABS tablet Take 1 tablet by mouth 2 times daily 12/12/19   Dewey Doris, CHRISTIANA - CNP   gemfibrozil (LOPID) 600 MG tablet take 1 tablet by mouth twice a day 12/9/19   Neha George, CHRISTIANA - CNP   metFORMIN (GLUCOPHAGE) 500 MG tablet 1 po with breakfast, 2 po with evening meal. 12/5/19   CHRISTIANA Bravo - CNP   nitroGLYCERIN (NITROSTAT) 0.4 MG SL tablet Place 1 tablet under the tongue every 5 minutes as needed for Chest pain 12/5/19 [facility] CHRISTIANA George - CNP   diclofenac (FLECTOR) 1.3 % patch Place 1 patch onto the skin 2 times daily 12/5/19 [facility] CHRISTIANA George - CNP   cyclobenzaprine (FLEXERIL) 10 MG tablet take 1 tablet by mouth every 8 hours if needed 11/12/19   Dewey CHRISTIANA George - GAYLA   fluticasone-salmeterol (ADVAIR) 250-50 MCG/DOSE AEPB Inhale 1 puff into the lungs 2 times daily 8/28/19   CHRISTIANA Bravo CNP   Blood Glucose Monitoring Suppl (TRUE METRIX AIR GLUCOSE METER) w/Device KIT 1 kit by Does not apply route 2 times daily 8/28/19   Dewey CHRISTIANA George CNP   blood glucose test strips (ASCENSIA AUTODISC VI;ONE TOUCH ULTRA TEST VI) strip 1 each by Other route 2 times daily As needed. 8/28/19   CHRISTIANA Bravo - CNP   omeprazole (PRILOSEC) 20 MG delayed release capsule Take 1 capsule by mouth every morning (before breakfast) 8/22/19   Dewey Doris, CHRISTIANA - CNP   ranolazine (RANEXA) 500 MG extended release tablet Take 500 mg by mouth 2 times daily    Historical Provider, MD   Blood Pressure Monitoring (BLOOD PRESSURE CUFF) MISC 1 each by Does not apply route daily as needed (monitoring) 2/20/19   Dewey CHRISTIANA George - CNP   tamsulosin (FLOMAX) 0.4 MG capsule Take 1 capsule by mouth daily.  6/17/13   Efrem Salter DO   Glucose Blood (BLOOD GLUCOSE TEST STRIPS) STRP Testing qd -bid prn 5/15/13   Issa Payan DO   albuterol (PROVENTIL)

## 2020-02-10 ENCOUNTER — CARE COORDINATION (OUTPATIENT)
Dept: CARE COORDINATION | Age: 70
End: 2020-02-10

## 2020-02-10 NOTE — CARE COORDINATION
Ambulatory Care Coordination Note  2/10/2020  CM Risk Score: 10  Charlson 10 Year Mortality Risk Score: 47%     ACC: Neil Keen RN    Summary Note: Spoke with patient who denied any needs from PCP or CC at this time. Patient stated he will go get a new filter for his nebulizer today. He stopped smoking 7 months ago and wants new supplies for machine. CC encouraged patient to call with any issues. Patient stated he ate peanuts and thinks that they caused a flare up of diverticulitis. He will give it another day and call if there is not any improvement. CC spoke with PCP who agreed to graduation at this time, cc will be happy to help in the future with any needs. Care Coordination Interventions    Program Enrollment:  Complex Care  Referral from Primary Care Provider:  No  Suggested Interventions and Community Resources  Diabetes Education:  Declined  Pulmonary Rehab:  Completed (Comment: order sent, brochure mailed to home)  Registered Dietician:  Declined  Zone Management Tools:  Completed (Comment: COPD, DM zones)         Goals Addressed                 This Visit's Progress     COMPLETED: Conditions and Symptoms   On track     I will schedule office visits, as directed by my provider. I will keep my appointment or reschedule if I have to cancel. I will notify my provider of any barriers to my plan of care. I will follow my Zone Management tool to seek urgent or emergent care. I will notify my provider of any symptoms that indicate a worsening of my condition. Barriers: lack of motivation and lack of education  Plan for overcoming my barriers: Care Manager   Confidence: 8/10  Anticipated Goal Completion Date: 12/13/2019              Prior to Admission medications    Medication Sig Start Date End Date Taking?  Authorizing Provider   lisinopril (PRINIVIL;ZESTRIL) 5 MG tablet Take 1 tablet by mouth 2 times daily 1/3/20   CHRISTIANA Ingram - CNP   apixaban (ELIQUIS) 5 MG TABS tablet Take 1 tablet by mouth 2 times daily 12/12/19   CHRISTIANA Rodriguez CNP   gemfibrozil (LOPID) 600 MG tablet take 1 tablet by mouth twice a day 12/9/19   CHRISTIANA Rodriguez CNP   metFORMIN (GLUCOPHAGE) 500 MG tablet 1 po with breakfast, 2 po with evening meal. 12/5/19   CHRISTIANA Rodriguez CNP   nitroGLYCERIN (NITROSTAT) 0.4 MG SL tablet Place 1 tablet under the tongue every 5 minutes as needed for Chest pain 12/5/19   CHRISTIANA Rodriguez CNP   diclofenac (FLECTOR) 1.3 % patch Place 1 patch onto the skin 2 times daily 12/5/19   CHRISTIANA Rodriguez CNP   cyclobenzaprine (FLEXERIL) 10 MG tablet take 1 tablet by mouth every 8 hours if needed 11/12/19   CHRISTIANA Rodriguez CNP   fluticasone-salmeterol (ADVAIR) 250-50 MCG/DOSE AEPB Inhale 1 puff into the lungs 2 times daily 8/28/19   CHRISTIANA Rodriguez CNP   Blood Glucose Monitoring Suppl (TRUE METRIX AIR GLUCOSE METER) w/Device KIT 1 kit by Does not apply route 2 times daily 8/28/19   CHRISTIANA Rodriguez CNP   blood glucose test strips (ASCENSIA AUTODISC VI;ONE TOUCH ULTRA TEST VI) strip 1 each by Other route 2 times daily As needed. 8/28/19   CHRISTIANA Rodriguez CNP   omeprazole (PRILOSEC) 20 MG delayed release capsule Take 1 capsule by mouth every morning (before breakfast) 8/22/19   CHRISTIANA Rodriguez CNP   ranolazine (RANEXA) 500 MG extended release tablet Take 500 mg by mouth 2 times daily    Historical Provider, MD   Blood Pressure Monitoring (BLOOD PRESSURE CUFF) MISC 1 each by Does not apply route daily as needed (monitoring) 2/20/19   CHRISTIANA Rodriguez CNP   tamsulosin (FLOMAX) 0.4 MG capsule Take 1 capsule by mouth daily. 6/17/13   Frederic Fothergill,    Glucose Blood (BLOOD GLUCOSE TEST STRIPS) STRP Testing qd -bid prn 5/15/13   Daniela Lux DO [facility]   albuterol (PROVENTIL) (2.5 MG/3ML) 0.083% nebulizer solution Take 2.5 mg by nebulization every 6 hours as needed.       Historical Provider, MD       Future Appointments   Date Time Provider Lucio Pascual   3/6/2020  7:30 AM [facility]     ,   Diabetes Assessment    Meal Planning:  None   How often do you test your blood sugar?:   (Comment: random)   Do you have barriers with adherence to non-pharmacologic self-management interventions?  (Nutrition/Exercise/Self-Monitoring):  No   Have you ever had to go to the ED for symptoms of low blood sugar?:  No       No patient-reported symptoms      ,   COPD Assessment    Does the patient understand envrionmental exposure?:  Yes  Is the patient able to verbalize Rescue vs. Long Acting medications?:  Yes  Does the patient have a nebulizer?:  Yes  Does the patient use a space with inhaled medications?:  No     No patient-reported symptoms         Symptoms:          and   General Assessment    Do you have any symptoms that are causing concern?:  Yes  Progression since Onset:  Intermittent - Waxing/Waning  Reported Symptoms:  Other (Comment: abdominal pain)

## 2020-03-06 PROBLEM — K21.9 GASTROESOPHAGEAL REFLUX DISEASE: Status: ACTIVE | Noted: 2020-03-06

## 2020-04-16 ENCOUNTER — HOSPITAL ENCOUNTER (OUTPATIENT)
Dept: GENERAL RADIOLOGY | Age: 70
Discharge: HOME | End: 2020-04-18
Payer: MEDICARE

## 2020-04-16 ENCOUNTER — HOSPITAL ENCOUNTER (OUTPATIENT)
Age: 70
Setting detail: SPECIMEN
Discharge: HOME | End: 2020-04-16
Payer: MEDICARE

## 2020-04-16 ENCOUNTER — HOSPITAL ENCOUNTER (OUTPATIENT)
Age: 70
Discharge: HOME | End: 2020-04-18
Payer: MEDICARE

## 2020-04-16 LAB
ABSOLUTE EOS #: 0.29 K/UL (ref 0–0.44)
ABSOLUTE IMMATURE GRANULOCYTE: 0.04 K/UL (ref 0–0.3)
ABSOLUTE LYMPH #: 1.89 K/UL (ref 1.1–3.7)
ABSOLUTE MONO #: 0.49 K/UL (ref 0.1–1.2)
ALBUMIN SERPL-MCNC: 4.1 G/DL (ref 3.5–5.2)
ALBUMIN/GLOBULIN RATIO: 1.4 (ref 1–2.5)
ALP BLD-CCNC: 56 U/L (ref 40–129)
ALT SERPL-CCNC: 18 U/L (ref 5–41)
ANION GAP SERPL CALCULATED.3IONS-SCNC: 15 MMOL/L (ref 9–17)
AST SERPL-CCNC: 18 U/L
BASOPHILS # BLD: 1 % (ref 0–2)
BASOPHILS ABSOLUTE: 0.03 K/UL (ref 0–0.2)
BILIRUB SERPL-MCNC: 0.32 MG/DL (ref 0.3–1.2)
BNP INTERPRETATION: NORMAL
BUN BLDV-MCNC: 11 MG/DL (ref 8–23)
BUN/CREAT BLD: ABNORMAL (ref 9–20)
CALCIUM SERPL-MCNC: 8.9 MG/DL (ref 8.6–10.4)
CHLORIDE BLD-SCNC: 103 MMOL/L (ref 98–107)
CHOLESTEROL/HDL RATIO: 5.1
CHOLESTEROL: 202 MG/DL
CO2: 25 MMOL/L (ref 20–31)
CREAT SERPL-MCNC: 0.66 MG/DL (ref 0.7–1.2)
DIFFERENTIAL TYPE: ABNORMAL
EOSINOPHILS RELATIVE PERCENT: 5 % (ref 1–4)
ESTIMATED AVERAGE GLUCOSE: 183 MG/DL
GFR AFRICAN AMERICAN: >60 ML/MIN
GFR NON-AFRICAN AMERICAN: >60 ML/MIN
GFR SERPL CREATININE-BSD FRML MDRD: ABNORMAL ML/MIN/{1.73_M2}
GFR SERPL CREATININE-BSD FRML MDRD: ABNORMAL ML/MIN/{1.73_M2}
GLUCOSE BLD-MCNC: 145 MG/DL (ref 70–99)
HBA1C MFR BLD: 8 % (ref 4–6)
HCT VFR BLD CALC: 42.4 % (ref 40.7–50.3)
HDLC SERPL-MCNC: 40 MG/DL
HEMOGLOBIN: 13.6 G/DL (ref 13–17)
IMMATURE GRANULOCYTES: 1 %
LDL CHOLESTEROL: 147 MG/DL (ref 0–130)
LYMPHOCYTES # BLD: 31 % (ref 24–43)
MAGNESIUM: 1.4 MG/DL (ref 1.6–2.6)
MCH RBC QN AUTO: 29.1 PG (ref 25.2–33.5)
MCHC RBC AUTO-ENTMCNC: 32.1 G/DL (ref 28.4–34.8)
MCV RBC AUTO: 90.6 FL (ref 82.6–102.9)
MONOCYTES # BLD: 8 % (ref 3–12)
MYOGLOBIN: 26 NG/ML (ref 28–72)
NRBC AUTOMATED: 0 PER 100 WBC
PDW BLD-RTO: 13.9 % (ref 11.8–14.4)
PLATELET # BLD: 208 K/UL (ref 138–453)
PLATELET ESTIMATE: ABNORMAL
PMV BLD AUTO: 10.3 FL (ref 8.1–13.5)
POTASSIUM SERPL-SCNC: 3.7 MMOL/L (ref 3.7–5.3)
PRO-BNP: 71 PG/ML
RBC # BLD: 4.68 M/UL (ref 4.21–5.77)
RBC # BLD: ABNORMAL 10*6/UL
SEG NEUTROPHILS: 54 % (ref 36–65)
SEGMENTED NEUTROPHILS ABSOLUTE COUNT: 3.34 K/UL (ref 1.5–8.1)
SODIUM BLD-SCNC: 143 MMOL/L (ref 135–144)
TOTAL PROTEIN: 7 G/DL (ref 6.4–8.3)
TRIGL SERPL-MCNC: 76 MG/DL
TROPONIN INTERP: NORMAL
TROPONIN T: NORMAL NG/ML
TROPONIN, HIGH SENSITIVITY: 10 NG/L (ref 0–22)
VLDLC SERPL CALC-MCNC: ABNORMAL MG/DL (ref 1–30)
WBC # BLD: 6.1 K/UL (ref 3.5–11.3)
WBC # BLD: ABNORMAL 10*3/UL

## 2020-04-16 PROCEDURE — 71046 X-RAY EXAM CHEST 2 VIEWS: CPT

## 2020-04-16 PROCEDURE — 74019 RADEX ABDOMEN 2 VIEWS: CPT

## 2020-05-05 ENCOUNTER — HOSPITAL ENCOUNTER (OUTPATIENT)
Age: 70
Setting detail: SPECIMEN
Discharge: HOME | End: 2020-05-05
Payer: MEDICARE

## 2020-05-05 ENCOUNTER — OFFICE VISIT (OUTPATIENT)
Dept: PRIMARY CARE CLINIC | Age: 70
End: 2020-05-05
Payer: MEDICARE

## 2020-05-05 VITALS — OXYGEN SATURATION: 91 % | TEMPERATURE: 98.2 F | HEART RATE: 75 BPM

## 2020-05-05 PROCEDURE — 99213 OFFICE O/P EST LOW 20 MIN: CPT | Performed by: FAMILY MEDICINE

## 2020-05-05 PROCEDURE — 3017F COLORECTAL CA SCREEN DOC REV: CPT | Performed by: FAMILY MEDICINE

## 2020-05-05 PROCEDURE — 1036F TOBACCO NON-USER: CPT | Performed by: FAMILY MEDICINE

## 2020-05-05 PROCEDURE — 1123F ACP DISCUSS/DSCN MKR DOCD: CPT | Performed by: FAMILY MEDICINE

## 2020-05-05 PROCEDURE — G8417 CALC BMI ABV UP PARAM F/U: HCPCS | Performed by: FAMILY MEDICINE

## 2020-05-05 PROCEDURE — G8427 DOCREV CUR MEDS BY ELIG CLIN: HCPCS | Performed by: FAMILY MEDICINE

## 2020-05-05 PROCEDURE — 4040F PNEUMOC VAC/ADMIN/RCVD: CPT | Performed by: FAMILY MEDICINE

## 2020-05-05 RX ORDER — BENZONATATE 100 MG/1
100 CAPSULE ORAL 3 TIMES DAILY PRN
Qty: 21 CAPSULE | Refills: 0 | Status: SHIPPED | OUTPATIENT
Start: 2020-05-05 | End: 2020-05-12

## 2020-05-05 ASSESSMENT — ENCOUNTER SYMPTOMS
NAUSEA: 0
COUGH: 1
WHEEZING: 0
SHORTNESS OF BREATH: 1
VOMITING: 0
ABDOMINAL PAIN: 0
DIARRHEA: 1
SORE THROAT: 0
SINUS PRESSURE: 1
CONSTIPATION: 1

## 2020-05-05 NOTE — PATIENT INSTRUCTIONS
You were tested for COVID today. We will call you with results when they are available. If you have not heard from us in 7 days, please call our office. Advance Care Planning  People with COVID-19 may have no symptoms, mild symptoms, such as fever, cough, and shortness of breath or they may have more severe illness, developing severe and fatal pneumonia. As a result, Advance Care Planning with attention to naming a health care decision maker (someone you trust to make healthcare decisions for you if you could not speak for yourself) and sharing other health care preferences is important BEFORE a possible health crisis. Please contact your Primary Care Provider to discuss Advance Care Planning. Preventing the Spread of Coronavirus Disease 2019 in Homes and Residential Communities  For the most recent information go to [employer]    Prevention steps for People with confirmed or suspected COVID-19 (including persons under investigation) who do not need to be hospitalized  and   People with confirmed COVID-19 who were hospitalized and determined to be medically stable to go home    Your healthcare provider and public health staff will evaluate whether you can be cared for at home. If it is determined that you do not need to be hospitalized and can be isolated at home, you will be monitored by staff from your local or state health department. You should follow the prevention steps below until a healthcare provider or local or state health department says you can return to your normal activities. Stay home except to get medical care  People who are mildly ill with COVID-19 are able to isolate at home during their illness. You should restrict activities outside your home, except for getting medical care. Do not go to work, school, or public areas. Avoid using public transportation, ride-sharing, or taxis.   Separate yourself from other people and local health department or occupational health professionals, as appropriate. When working with your local health department check their available hours. If you have a medical emergency and need to call 911, notify the dispatch personnel that you have, or are being evaluated for COVID-19. If possible, put on a facemask before emergency medical services arrive. Discontinuing home isolation  Patients with confirmed COVID-19 should remain under home isolation precautions until the risk of secondary transmission to others is thought to be low. The decision to discontinue home isolation precautions should be made on a case-by-case basis, in consultation with healthcare providers and state and local health departments.

## 2020-05-05 NOTE — PROGRESS NOTES
[address] CLINIC  [address] [address]  Dept: [phone]  Dept Fax: [phone]    Frandy Ortiz is a 71 y.o. male who presents today for his medical conditions/complaintsas noted below. Frandy Ortiz is c/o of Concern For COVID-19 (SOB, Nasal draiange, cough, gastro problems x 2 moths )        HPI:     Cough   This is a chronic problem. The current episode started more than 1 month ago (2 months). The problem has been unchanged. The problem occurs constantly. The cough is productive of sputum. Associated symptoms include nasal congestion and shortness of breath. Pertinent negatives include no ear congestion, ear pain, fever, myalgias, rash, sore throat or wheezing. Associated symptoms comments: Diarrhea/constipation/bloating. The symptoms are aggravated by exercise. Treatments tried: stool softeners, laxatives, tessalon pearles. The treatment provided moderate relief. His past medical history is significant for COPD. There is no history of asthma or environmental allergies. Hx of DM, CAD, a. fib   Patient reports that several people he was around a couple months ago were sick. He has not had any sick exposures recently but his wife did have contact with a person who is currently being tested for COVID. Patient had CXR a couple weeks ago which did not show any acute cardiopulmonary process.  He reports respiratory symptoms have not changed in that time  Past Medical History:   Diagnosis Date    Atrial flutter ([address] 05/17/2018    CAD (coronary artery disease)     Callus of foot     rt     COPD (chronic obstructive pulmonary disease) (HCC)     GERD (gastroesophageal reflux disease)     H/O cardiovascular stress test 09/2017    Hyperlipidemia     Hypertension     Obesity     Osteoarthritis     Poor compliance     Rotator cuff tear     bilat    Shoulder pain, bilateral     Type II or unspecified type diabetes mellitus without mention Negative for adenopathy. Objective:   Patient was evaluated [place] today during this pandemic covid 19 situation. Physical Exam  Vitals signs and nursing note reviewed. Constitutional:       General: He is not in acute distress. Appearance: Normal appearance. He is obese. HENT:      Head: Normocephalic and atraumatic. Right Ear: Hearing normal.      Left Ear: Hearing normal.      Nose: Nose normal.      Mouth/Throat:      Lips: Pink. Mouth: Mucous membranes are moist.      Pharynx: Oropharynx is clear. Eyes:      Extraocular Movements: Extraocular movements intact. Conjunctiva/sclera: Conjunctivae normal.   Neck:      Musculoskeletal: Normal range of motion. Cardiovascular:      Rate and Rhythm: Normal rate and regular rhythm. Heart sounds: Normal heart sounds. Pulmonary:      Effort: Pulmonary effort is normal.      Breath sounds: Normal breath sounds. Skin:     General: Skin is warm and dry. Neurological:      Mental Status: He is alert and oriented to person, place, and time. Mental status is at baseline. Psychiatric:         Mood and Affect: Mood normal.         Behavior: Behavior normal.         Thought Content: Thought content normal.         Judgment: Judgment normal.       Pulse 75   Temp 98.2 °F (36.8 °C) (Tympanic)   SpO2 91%     Assessment:       Diagnosis Orders   1. Suspected COVID-19 virus infection  COVID-19 Ambulatory   2. Viral illness         Plan: You were tested for COVID today. We will call you with results when they are available. If you have not heard from us in 7 days, please call our office. ]    [address] [place] [address] with COVID-19 may have no symptoms, mild symptoms, such as fever, cough, and shortness of breath or they may have more severe illness, developing severe and fatal pneumonia.   As a result, Advance Care Planning with attention to naming a health care decision maker (someone you trust to make healthcare decisions for you if you could not speak for yourself) and sharing other health care preferences is important BEFORE a possible health crisis. Please contact your Primary Care Provider to discuss Advance Care Planning. Preventing the Spread of Coronavirus Disease 2019 in Homes and Residential Communities  For the most recent information go to [employer]    Prevention steps for People with confirmed or suspected COVID-19 (including persons under investigation) who do not need to be hospitalized  and   People with confirmed COVID-19 who were hospitalized and determined to be medically stable to go home    Your healthcare provider and public health staff will evaluate whether you can be cared for at home. If it is determined that you do not need to be hospitalized and can be isolated at home, you will be monitored by staff from your local or state health department. You should follow the prevention steps below until a healthcare provider or local or state health department says you can return to your normal activities. Stay home except to get medical care  People who are mildly ill with COVID-19 are able to isolate at home during their illness. You should restrict activities outside your home, except for getting medical care. Do not go to work, school, or public areas. Avoid using public transportation, ride-sharing, or taxis. Separate yourself from other people and animals in your home  People: As much as possible, you should stay in a specific room and away from other people in your home. Also, you should use a separate bathroom, if available. Animals: You should restrict contact with pets and other animals while you are sick with COVID-19, just like you would around other people.  Although there have not been reports of pets or other animals becoming sick with COVID-19, it is still recommended that people sick with COVID-19 limit contact with animals until more information is known about the virus. When possible, have another member of your household care for your animals while you are sick. If you are sick with COVID-19, avoid contact with your pet, including petting, snuggling, being kissed or licked, and sharing food. If you must care for your pet or be around animals while you are sick, wash your hands before and after you interact with pets and wear a facemask. Call ahead before visiting your doctor  If you have a medical appointment, call the healthcare provider and tell them that you have or may have COVID-19. This will help the healthcare providers office take steps to keep other people from getting infected or exposed. Wear a facemask  You should wear a facemask when you are around other people (e.g., sharing a room or vehicle) or pets and before you enter a healthcare providers office. If you are not able to wear a facemask (for example, because it causes trouble breathing), then people who live with you should not stay in the same room with you, or they should wear a facemask if they enter your room. Cover your coughs and sneezes  Cover your mouth and nose with a tissue when you cough or sneeze. Throw used tissues in a lined trash can. Immediately wash your hands with soap and water for at least 20 seconds or, if soap and water are not available, clean your hands with an alcohol-based hand  that contains at least 60% alcohol. Clean your hands often  Wash your hands often with soap and water for at least 20 seconds, especially after blowing your nose, coughing, or sneezing; going to the bathroom; and before eating or preparing food. If soap and water are not readily available, use an alcohol-based hand  with at least 60% alcohol, covering all surfaces of your hands and rubbing them together until they feel dry. Soap and water are the best option if hands are visibly dirty.  Avoid touching your eyes, nose, and mouth with unwashed hands. Avoid sharing personal household items  You should not share dishes, drinking glasses, cups, eating utensils, towels, or bedding with other people or pets in your home. After using these items, they should be washed thoroughly with soap and water. Clean all high-touch surfaces everyday  High touch surfaces include counters, tabletops, doorknobs, bathroom fixtures, toilets, phones, keyboards, tablets, and bedside tables. Also, clean any surfaces that may have blood, stool, or body fluids on them. Use a household cleaning spray or wipe, according to the label instructions. Labels contain instructions for safe and effective use of the cleaning product including precautions you should take when applying the product, such as wearing gloves and making sure you have good ventilation during use of the product. Monitor your symptoms  Seek prompt medical attention if your illness is worsening (e.g., difficulty breathing). Before seeking care, call your healthcare provider and tell them that you have, or are being evaluated for, COVID-19. Put on a facemask before you enter the facility. These steps will help the healthcare providers office to keep other people in the office or waiting room from getting infected or exposed. Ask your healthcare provider to call the local or state health department. Persons who are placed under active monitoring or facilitated self-monitoring should follow instructions provided by their local health department or occupational health professionals, as appropriate. When working with your local health department check their available hours. If you have a medical emergency and need to call 911, notify the dispatch personnel that you have, or are being evaluated for COVID-19. If possible, put on a facemask before emergency medical services arrive.   Discontinuing home isolation  Patients with confirmed COVID-19 should remain under home isolation precautions until the risk of secondary transmission to others is thought to be low. The decision to discontinue home isolation precautions should be made on a case-by-case basis, in consultation with healthcare providers and state and local health departments. Orders Placed This Encounter   Procedures    COVID-19 Ambulatory     Oropharyngeal     Standing Status:   Future     Standing Expiration Date:   5/5/2021     Scheduling Instructions:      Saline media preferred given current shortage of viral transport media but both acceptable     Orders Placed This Encounter   Medications    benzonatate (TESSALON) 100 MG capsule     Sig: Take 1 capsule by mouth 3 times daily as needed for Cough     Dispense:  21 capsule     Refill:  0      Patient given educational materials - see patient instructions. Discussed use, benefit, and side effects of prescribed medications. All patient questions answered. Pt voiced understanding. Patient agreed with treatment plan. Follow up as directed.      Electronicallysigned by Cam Treadwell MD on 5/5/2020 at 11:35 AM

## 2020-05-06 LAB — SARS-COV-2, NAA: NOT DETECTED

## 2020-05-11 ENCOUNTER — HOSPITAL ENCOUNTER (OUTPATIENT)
Age: 70
Setting detail: SPECIMEN
Discharge: HOME | End: 2020-05-11
Payer: MEDICARE

## 2020-05-15 LAB
CULTURE: ABNORMAL
CULTURE: ABNORMAL
DIRECT EXAM: ABNORMAL
Lab: ABNORMAL
SPECIMEN DESCRIPTION: ABNORMAL

## 2020-08-03 ENCOUNTER — TELEPHONE (OUTPATIENT)
Dept: SPIRITUAL SERVICES | Age: 70
End: 2020-08-03

## 2020-08-17 ENCOUNTER — TELEPHONE (OUTPATIENT)
Dept: SPIRITUAL SERVICES | Age: 70
End: 2020-08-17

## 2020-08-26 ENCOUNTER — TELEPHONE (OUTPATIENT)
Dept: PSYCHOLOGY | Age: 70
End: 2020-08-26

## 2020-08-26 NOTE — TELEPHONE ENCOUNTER
[facility] called pt on 8/26 to provide reminder contact about 8/31 8:00am appt. Pt expressed appreciation and confirmed office location with [facility].

## 2020-08-31 ENCOUNTER — OFFICE VISIT (OUTPATIENT)
Dept: PSYCHOLOGY | Age: 70
End: 2020-08-31
Payer: MEDICARE

## 2020-08-31 PROBLEM — F41.9 ANXIETY DISORDER: Status: ACTIVE | Noted: 2020-08-31

## 2020-08-31 PROCEDURE — 90791 PSYCH DIAGNOSTIC EVALUATION: CPT | Performed by: PSYCHOLOGIST

## 2020-08-31 PROCEDURE — 1036F TOBACCO NON-USER: CPT | Performed by: PSYCHOLOGIST

## 2020-08-31 NOTE — PROGRESS NOTES
Kalani Kellogg M.A. Psychology Doctoral Trainee    Supervising Clinical Psychologists:  Kristin Medina, Ph.D. Anjana Angulo,  Ph.D. Jbbatool Majano    Visit Date: 8/31/2020   Time of appointment:  8:08-9:10am  Time spent with Patient: 62 minutes. This is patient's first appointment. Reason for Consult: Other (Anger/Irritability) and Anxiety (Sx: irritable, restless, stressors)     Referring Provider/PCP:    Holly Edgar, APRN - GAYLA Santoyo APRN - CNP      Pt provided informed consent for the behavioral health program. Discussed with patient model of service to include the limits of confidentiality (i.e. abuse reporting, suicide intervention, etc.) and short-term intervention focused approach. Also discussed with patient that the service provider is a supervised clinician and in particular, is being supervised by Dr. Ned Lafleur and/or Dr. Dani Loera. Pt indicated understanding. Pt also signed the consent form agreeing to be seen by a supervised clinician. PRESENTING PROBLEM AND HISTORY  Kelly Ma" is a 71 y.o. male who presents for new evaluation and treatment of  Anxiety and \"anger problems. \"  He has the following symptoms: anger/irritability, stress. Onset of symptoms was approximately \"all my life\" ago. Symptoms have been unchanged since that time. He denies current suicidal and homicidal ideation. Family history significant for no psychiatric illness. Risk factors: none. Previous treatment includes court-appointed counseling for \"anger problems\". He reported that he does not currently experience side effects of the medication he is on. No hx of suicide attempts, mental health hospitalizations, or attacking other people. Richard Lawrence reported that he has been with his partner who he calls his wife for about 28 years but that they never .  However, since a fight about a year ago when he discovered his wife was taking his pain medication, he reports that they have been living as \"roommates\" and that their Herminia Sanchez has fallen apart. \"     Pt reported that he has been feeling higher than normal levels of stress and anxiety in the past few months - that he has been bothered by having to wear a mask, and has a continued history of \"yelling at my guys [his employees]. \" He stated that he feels he has \"anger issues\" and has angry outbursts approximately once/day. Herber Tilley stated \"I really don't want to get angry, I just can't control it. \" He clarified that he would like to decrease the frequency and intensity of his angry outbursts. Pt reported that he uses marijuana nightly and that it helps him sleep. He clarified that he used to lay in bed at night stressing about the day, but since picking up a daily marijuana use habit, he has not had as much trouble falling asleep. MENTAL STATUS EXAM  Mood was within normal limits with calm affect. Suicidal ideation was denied. Homicidal ideation was denied. Hygiene was fair . Dress was appropriate. Behavior was Within Normal Limits with No observation of difficulties ambulating. Attitude was Cooperative. Eye-contact was fair. Speech: rate - WNL, rhythm -  WNL, volume - WNL  Verbalizations were tangential.  Thought processes were intact and goal-oriented without evidence of delusions, hallucinations, obsessions, or tonya; with little cognitive distortions. Associations were characterized by tangential cognitive processes. Pt was oriented to person, place, time, and general circumstances;  recent:  good and fair. Insight and judgment were estimated to be fair, AEB, a fair  understanding of cyclical maladaptive patterns, and the ability to use insight to inform behavior change.        CURRENT MEDICATIONS    Current Outpatient Medications:     omeprazole (PRILOSEC) 20 MG delayed release capsule, take 1 capsule by mouth every morning before breakfast, Disp: 90 capsule, Rfl: 1   ezetimibe (ZETIA) 10 MG tablet, Take 1 tablet by mouth daily, Disp: 90 tablet, Rfl: 1    varenicline (CHANTIX STARTING MONTH JESUS) 0.5 MG X 11 & 1 MG X 42 tablet, Dispense 1 starting pack, Disp: 1 box, Rfl: 0    lisinopril (PRINIVIL;ZESTRIL) 5 MG tablet, Take 1 tablet by mouth 2 times daily, Disp: 60 tablet, Rfl: 2    tamsulosin (FLOMAX) 0.4 MG capsule, Take one capsule by mouth once daily, Disp: 30 capsule, Rfl: 3    cyclobenzaprine (FLEXERIL) 10 MG tablet, take 1 tablet by mouth every 8 hours if needed, Disp: 60 tablet, Rfl: 0    metFORMIN (GLUCOPHAGE) 500 MG tablet, 1 po with breakfast, 2 po with evening meal., Disp: 270 tablet, Rfl: 1    gemfibrozil (LOPID) 600 MG tablet, Take 1 tablet by mouth 2 times daily Take 1 tablet by mouth twice a day, Disp: 180 tablet, Rfl: 1    mupirocin (BACTROBAN) 2 % ointment, Apply topically 3 times daily to affected area, Disp: 30 g, Rfl: 0    docusate sodium (COLACE) 100 MG capsule, Take 1 capsule by mouth 2 times daily, Disp: 60 capsule, Rfl: 0    fluticasone-salmeterol (ADVAIR) 250-50 MCG/DOSE AEPB, Inhale 1 puff into the lungs every 12 hours, Disp: 60 each, Rfl: 3    albuterol sulfate  (90 Base) MCG/ACT inhaler, Inhale 2 puffs into the lungs every 6 hours as needed for Shortness of Breath, Disp: 1 Inhaler, Rfl: 3    albuterol (PROVENTIL) (2.5 MG/3ML) 0.083% nebulizer solution, Take 3 mLs by nebulization every 6 hours as needed for Wheezing or Shortness of Breath, Disp: 120 each, Rfl: 3    ipratropium-albuterol (DUONEB) 0.5-2.5 (3) MG/3ML SOLN nebulizer solution, Inhale 3 mLs into the lungs every 4 hours, Disp: 100 vial, Rfl: 2    Respiratory Therapy Supplies KIT, 3 mLs by Does not apply route 4 times daily Respiratory Therapy Supplies (NEBULIZER/TUBING/MOUTHPIECE) KIT, Disp: 1 kit, Rfl: 0    Respiratory Therapy Supplies KIT, Sig: 3 mLs by Does not apply route 4 times daily respiratory therapy supplies (Nebulizer/Tubing/mouthpiece) KIT, Disp: 1 kit, Rfl: 0    apixaban (ELIQUIS) 5 MG TABS tablet, Take 1 tablet by mouth 2 times daily, Disp: 60 tablet, Rfl: 3    nitroGLYCERIN (NITROSTAT) 0.4 MG SL tablet, Place 1 tablet under the tongue every 5 minutes as needed for Chest pain, Disp: 10 tablet, Rfl: 5    Blood Glucose Monitoring Suppl (TRUE METRIX AIR GLUCOSE METER) w/Device KIT, 1 kit by Does not apply route 2 times daily, Disp: 1 kit, Rfl: 0    blood glucose test strips (ASCENSIA AUTODISC VI;ONE TOUCH ULTRA TEST VI) strip, 1 each by Other route 2 times daily As needed. , Disp: 100 each, Rfl: 2    ranolazine (RANEXA) 500 MG extended release tablet, Take 500 mg by mouth 2 times daily, Disp: , Rfl:     Blood Pressure Monitoring (BLOOD PRESSURE CUFF) MISC, 1 each by Does not apply route daily as needed (monitoring), Disp: 1 each, Rfl: 0    Glucose Blood (BLOOD GLUCOSE TEST STRIPS) STRP, Testing qd -bid prn, Disp: 100 strip, Rfl: 11     FAMILY MEDICAL/MH HISTORY   His family history includes Arthritis in an other family member; COPD in an other family member; Cancer in an other family member; Diabetes in an other family member; Heart Disease in an other family member; High Blood Pressure in an other family member. PATIENT MENTAL HEALTH HISTORY  Pt reported no history of mental health treatment other than a court-appointed counselor who informed Maribel Ramirez that he Tristian Aleksander an anger issue\" with which Maribel Ramirez agreed. No history of mental health hospitalizations or suicide attempts. PSYCHOSOCIAL HISTORY  Current living situation: Pt reported no concerns about quality of living, but did indicate some stress about his and his \"wife's\" relationship. He indicated that they are no longer a couple, but instead \"roommates. 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 He added that this work \"stresses me out\" and that he is thinking about not working next summer.      Support system: When asked about a support system, Betty Grubbs identified that he likes to call his family in [place] and talk to them, especially his sister. Religious/Spirituality: Betty Grubbs reported that he is \"a Orthodox\" and goes \"to [employer]" and has been watching services online lately. DRUG AND ALCOHOL CURRENT USE/HISTORY  TOBACCO:  He reports that he quit smoking about 2 years ago. His smoking use included cigarettes. He has a 10.00 pack-year smoking history. He has never used smokeless tobacco.  ALCOHOL:  He reports current alcohol use of about 4.0 standard drinks of alcohol per week. OTHER SUBSTANCES: He reports current drug use. Drug: Marijuana. Daily use, 55yrs      ASSESSMENT  Chana Renee presented to the appointment today for evaluation and treatment of symptoms of anxiety and irritability. He is currently deemed a low risk to himself and others and meets criteria for an unspecified anxiety disorder. He is currently under the medical care of Sindy Galvez CNP, and has been instructed to speak with Andre Augustin about his medications if he has any questions. Bassem's anxiety and irritability symptoms are well controlled at this time. He will benefit from brief and solution-focused consultation to address cognitive and behavioral interventions for his anxiety and irritability symptoms. Meme Byrd was in agreement with recommendations. PHQ Scores 7/30/2020 2/18/2020 6/26/2019 2/3/2019   PHQ2 Score 1 0 0 0   PHQ9 Score 1 0 0 0     Interpretation of Total Score Depression Severity: 1-4 = Minimal depression, 5-9 = Mild depression, 10-14 = Moderate depression, 15-19 = Moderately severe depression, 20-27 = Severe depression    How often pt has had thoughts of death or hurting self (if PHQ positive for depression):       No flowsheet data found. Interpretation of ERIKA-7 score: 5-9 = mild anxiety, 10-14 = moderate anxiety, 15+ = severe anxiety. Recommend referral to behavioral health for scores 10 or greater.       DIAGNOSIS  Meme Byrd

## 2020-09-21 ENCOUNTER — TELEPHONE (OUTPATIENT)
Dept: SPIRITUAL SERVICES | Age: 70
End: 2020-09-21

## 2020-09-21 NOTE — TELEPHONE ENCOUNTER
.ACP invitation extended by PCP. An outreach call was made to patient by an ACP Clinical Specialist and patient is aware of ACP referral. A call was scheduled for this week and the AD documents were emailed to the patient.

## 2020-09-23 ENCOUNTER — OFFICE VISIT (OUTPATIENT)
Dept: SURGERY | Age: 70
End: 2020-09-23
Payer: MEDICARE

## 2020-09-23 VITALS
SYSTOLIC BLOOD PRESSURE: 123 MMHG | OXYGEN SATURATION: 95 % | HEART RATE: 75 BPM | HEIGHT: 71 IN | WEIGHT: 236.8 LBS | DIASTOLIC BLOOD PRESSURE: 75 MMHG | BODY MASS INDEX: 33.15 KG/M2

## 2020-09-23 PROCEDURE — 99202 OFFICE O/P NEW SF 15 MIN: CPT | Performed by: SURGERY

## 2020-09-23 RX ORDER — SODIUM, POTASSIUM,MAG SULFATES 17.5-3.13G
SOLUTION, RECONSTITUTED, ORAL ORAL
Qty: 1 KIT | Refills: 0 | Status: SHIPPED | OUTPATIENT
Start: 2020-09-23 | End: 2021-01-26

## 2020-09-23 ASSESSMENT — ENCOUNTER SYMPTOMS
BLOOD IN STOOL: 1
RESPIRATORY NEGATIVE: 1
ABDOMINAL PAIN: 1
CONSTIPATION: 1

## 2020-09-23 NOTE — H&P
[facility] [address] [facility] [address] [place]. [address], [address]        Rivera Rice   71 y.o.  1950  [id number]     PCP: CHRISTIANA Ambrosio CNP  Referring Provider: CHRISTIANA Whalen CNP   Author:   Dr. Bjorn Andrea MD    Reason for Visit:  Chief Complaint   Patient presents with    New Patient     Patient states he is here to discuss having a colonoscopy. HPI:  Rivera Rice presents in evaluation for colonoscopy. He is somewhat uncertain as to the timing of his prior colonoscopy although believes it was greater than 5 years ago. He reports recent intermittent episodes of change in bowel habits including significant constipation with abdominal pain as well as an episode of rectal bleeding. This has somewhat improved with the use of laxatives. Family history is negative      Review of Systems   Constitutional: Negative. Respiratory: Negative. Cardiovascular: Negative. Gastrointestinal: Positive for abdominal pain, blood in stool and constipation. Musculoskeletal: Negative. Neurological: Negative. Allergies: Allergies   Allergen Reactions    Symbicort [Budesonide-Formoterol Fumarate] Swelling     Patient reports throat swelling.  Barium-Containing Compounds      \"had a reaction to a test, I think it was barium\"    Dye  [Iodides]      Other reaction(s): Wheezing  Red in color, hot, wheezing    Pravastatin      Statins cause severe muscle aches.     Statins Depletion Therapy        Current Medications:  Current Outpatient Medications   Medication Sig Dispense Refill    SUPREP BOWEL PREP KIT 17.5-3.13-1.6 GM/177ML SOLN Take as directed - dispense one Kit 1 kit 0    docusate sodium (COLACE) 100 MG capsule Take 1 capsule by mouth 2 times daily 180 capsule 1    magnesium citrate solution Take 1/2 bottle now and then repeat in 8 hours if no results 296 mL 0    varenicline (CHANTIX CONTINUING MONTH JESUS) 1 MG tablet Take 1 tablet by mouth 2 times daily 60 tablet 3    omeprazole (PRILOSEC) 20 MG delayed release capsule take 1 capsule by mouth every morning before breakfast 90 capsule 1    ezetimibe (ZETIA) 10 MG tablet Take 1 tablet by mouth daily 90 tablet 1    lisinopril (PRINIVIL;ZESTRIL) 5 MG tablet Take 1 tablet by mouth 2 times daily 60 tablet 2    tamsulosin (FLOMAX) 0.4 MG capsule Take one capsule by mouth once daily 30 capsule 3    cyclobenzaprine (FLEXERIL) 10 MG tablet take 1 tablet by mouth every 8 hours if needed 60 tablet 0    metFORMIN (GLUCOPHAGE) 500 MG tablet 1 po with breakfast, 2 po with evening meal. 270 tablet 1    fluticasone-salmeterol (ADVAIR) 250-50 MCG/DOSE AEPB Inhale 1 puff into the lungs every 12 hours 60 each 3    albuterol sulfate  (90 Base) MCG/ACT inhaler Inhale 2 puffs into the lungs every 6 hours as needed for Shortness of Breath 1 Inhaler 3    albuterol (PROVENTIL) (2.5 MG/3ML) 0.083% nebulizer solution Take 3 mLs by nebulization every 6 hours as needed for Wheezing or Shortness of Breath 120 each 3    ipratropium-albuterol (DUONEB) 0.5-2.5 (3) MG/3ML SOLN nebulizer solution Inhale 3 mLs into the lungs every 4 hours 100 vial 2    apixaban (ELIQUIS) 5 MG TABS tablet Take 1 tablet by mouth 2 times daily 60 tablet 3    nitroGLYCERIN (NITROSTAT) 0.4 MG SL tablet Place 1 tablet under the tongue every 5 minutes as needed for Chest pain 10 tablet 5    Blood Glucose Monitoring Suppl (TRUE METRIX AIR GLUCOSE METER) w/Device KIT 1 kit by Does not apply route 2 times daily 1 kit 0    blood glucose test strips (ASCENSIA AUTODISC VI;ONE TOUCH ULTRA TEST VI) strip 1 each by Other route 2 times daily As needed.  100 each 2    ranolazine (RANEXA) 500 MG extended release tablet Take 500 mg by mouth 2 times daily      Blood Pressure Monitoring (BLOOD PRESSURE CUFF) MISC 1 each by Does not apply route daily as needed (monitoring) 1 each 0    Glucose Blood (BLOOD GLUCOSE TEST STRIPS) STRP Testing qd -bid prn 100 strip 11    metoclopramide (REGLAN) 5 MG tablet Take 1 tablet by mouth 2 times daily 6 tablet 0    mupirocin (BACTROBAN) 2 % ointment Apply topically 3 times daily to affected area (Patient not taking: Reported on 9/23/2020) 30 g 0     No current facility-administered medications for this visit.         Problem List:  Patient Active Problem List   Diagnosis    Hypertension    Hyperlipidemia    CAD (coronary artery disease)    COPD [facility]ch[facility] obstructive pulmonary disease) ([facility] [address]    Osteoarthritis    Obesity    Rotator cuff tear    Shoulder pain, bilateral    Callus of foot    DM type 2 [facility]diabetes mellitus, type 2) ([facility])    Shoulder arthritis    RUQ pain    Atrial flutter ([facility])    Benign prostatic hyperplasia without lower urinary tract symptoms    Long term use of drug    S/P placement of cardiac pacemaker    Presence of stent in coronary artery in patient with coronary artery disease    Gastroesophageal reflux disease    Anxiety disorder        Histories:  Past Medical History:   Diagnosis Date    Anxiety disorder 8/31/2020    Atrial flutter ([address] 05/17/2018    CAD (coronary artery disease)     Callus of foot     rt     COPD (chronic obstructive pulmonary disease) ([facility] [address]     GERD (gastroesophageal reflux disease)     H/O cardiovascular stress test 09/2017    Hyperlipidemia     Hypertension     Obesity     Osteoarthritis     Poor compliance     Rotator cuff tear     bilat    Shoulder pain, bilateral     Type II or unspecified type diabetes mellitus without mention of complication, not stated as uncontrolled       Family History   Problem Relation Age of Onset    Diabetes Other     Heart Disease Other     High Blood Pressure Other     Cancer Other     COPD Other     Arthritis Other       Past Surgical History:   Procedure Laterality Date    CARDIAC CATHETERIZATION  05/17/2018    CARDIAC CATHETERIZATION  2011    CORONARY ANGIOPLASTY WITH STENT PLACEMENT 2009    Mid RCA (Liberte 3.5 x 32) Proximal RCA (4x20)    PACEMAKER INSERTION  2019    Dr. Blu Aguiar      Social History     Socioeconomic History    Marital status:      Spouse name: None    Number of children: None    Years of education: None    Highest education level: None   Occupational History    None   Social Needs    Financial resource strain: Not very hard    Food insecurity     Worry: Never true     Inability: Never true    Transportation needs     Medical: No     Non-medical: No   Tobacco Use    Smoking status: Current Some Day Smoker     Packs/day: 0.25     Years: 40.00     Pack years: 10.00     Types: Cigarettes     Last attempt to quit: 2018     Years since quittin.3    Smokeless tobacco: Never Used   Substance and Sexual Activity    Alcohol use: Yes     Alcohol/week: 4.0 standard drinks     Types: 4 Cans of beer per week     Frequency: 2-3 times a week     Drinks per session: 1 or 2     Binge frequency: Never    Drug use: Yes     Types: Marijuana    Sexual activity: Not Currently   Lifestyle    Physical activity     Days per week: None     Minutes per session: None    Stress:  To some extent   Relationships    Social connections     Talks on phone: None     Gets together: None     Attends Restorationist service: None     Active member of club or organization: None     Attends meetings of clubs or organizations: None     Relationship status: None    Intimate partner violence     Fear of current or ex partner: None     Emotionally abused: None     Physically abused: None     Forced sexual activity: None   Other Topics Concern    None   Social History Narrative    None        Physical Examination:  /75 (Site: Left Upper Arm, Position: Sitting, Cuff Size: Medium Adult)   Pulse 75   Ht 5' 11\" (1.803 m)   Wt 236 lb 12.8 oz (107.4 kg)   SpO2 95%   BMI 33.03 kg/m²     Neck: Supple  Respiratory:  Lungs are clear to auscultation  Cardiovascular:  Normal Rate and Rhythm  Gastrointestinal:    Abdomen:  Soft, non-tender, no masses   Rectum/Anus:  Deferred to procedure  Integumentary:  Warm and dry  Neurologic:  Alert     Impression and Plan:    Diagnoses:     Diagnosis Orders   1. Change in bowel habits        Plan:  Colonoscopy. The risks, benefits, options and potential complications of the procedure were discussed in detail with the patient and they agreed to proceed and consent was signed. Preoperative bowel prep instructions given to the patient. As patient has pacemaker and is on Eliquis we will obtain cardiology recommendations prior to the procedure.      Electronically signed by Genaro Queen MD on 9/23/20 at 1:34 PM EDT

## 2020-09-24 ENCOUNTER — TELEPHONE (OUTPATIENT)
Dept: SPIRITUAL SERVICES | Age: 70
End: 2020-09-24

## 2020-09-28 ENCOUNTER — TELEPHONE (OUTPATIENT)
Dept: CARE COORDINATION | Age: 70
End: 2020-09-28

## 2020-10-09 ENCOUNTER — TELEPHONE (OUTPATIENT)
Dept: SPIRITUAL SERVICES | Age: 70
End: 2020-10-09

## 2020-10-12 ENCOUNTER — CLINICAL DOCUMENTATION (OUTPATIENT)
Dept: SPIRITUAL SERVICES | Age: 70
End: 2020-10-12

## 2020-10-12 ENCOUNTER — TELEPHONE (OUTPATIENT)
Dept: SPIRITUAL SERVICES | Age: 70
End: 2020-10-12

## 2020-10-12 NOTE — ACP (ADVANCE CARE PLANNING)
Advance Care Planning     Advance Care Planning Clinical Specialist  Conversation Note      Date of ACP Conversation: 10/12/2020    Conversation Conducted with: Patient with Decision Making Capacity    ACP Clinical Specialist: Saavnnah Turcios      If no Decision Maker listed above or available through scanned documents, then:    If no Authorized Decision Maker has previously been identified, then patient chooses [place]:  \"Who would you like to name as your primary health care decision-maker? \"               Name: Radha Cornelius     Relationship: wife          Phone number: [phone]  Artur Pulliam this person be reached easily? \" Yes     ACP Specialist had scheduled ACP coversation with patient and his wife for 7:00 PM at patient's request.  Patient had confirmed appointment and also confirmed that he received the AD documents in the mail. ACP Specialist began the conversation and asked the patient to have AD documents ready to review together. Patient could not locate the documents and asked the writer to call back in 20 minutes. ACP Specialist called back and wife answered the phone and said she could not locate the AD documents and asked reschedule the ACP conversation. [] Yes   [x] No   Educated Patient / Kaey Beatty regarding differences between Advance Directives and portable DNR orders.     Length of ACP Conversation in minutes:  10    Conversation Outcomes:  [] ACP discussion completed  [] Existing advance directive reviewed with patient; no changes to patient's previously recorded wishes  [] New Advance Directive completed  [] Portable Do Not Rescitate prepared for Provider review and signature  [] POLST/POST/MOLST/MOST prepared for Provider review and signature      Follow-up plan:    [x] Schedule follow-up conversation to continue planning  [x] Referred individual to Provider for additional questions/concerns   [] Advised patient/agent/surrogate to review completed ACP document and update

## 2020-10-12 NOTE — TELEPHONE ENCOUNTER
An outreach call was made to the patient by ACP Specialist and patient said he cannot find the AD packet that was mailed to him and asked to reschedule the call.

## 2020-10-26 ENCOUNTER — TELEPHONE (OUTPATIENT)
Dept: SPIRITUAL SERVICES | Age: 70
End: 2020-10-26

## 2020-10-26 ENCOUNTER — HOSPITAL ENCOUNTER (OUTPATIENT)
Dept: PREADMISSION TESTING | Age: 70
Setting detail: SPECIMEN
Discharge: HOME | End: 2020-10-30
Payer: MEDICARE

## 2020-10-26 PROCEDURE — U0003 INFECTIOUS AGENT DETECTION BY NUCLEIC ACID (DNA OR RNA); SEVERE ACUTE RESPIRATORY SYNDROME CORONAVIRUS 2 (SARS-COV-2) (CORONAVIRUS DISEASE [COVID-19]), AMPLIFIED PROBE TECHNIQUE, MAKING USE OF HIGH THROUGHPUT TECHNOLOGIES AS DESCRIBED BY CMS-2020-01-R: HCPCS

## 2020-10-26 NOTE — TELEPHONE ENCOUNTER
ACP Specialist called patient and spoke to patient about the importance of completing AD documents. Patient said he is aware of referral and is having surgery on Friday. Writer offered to assist patient with completing documents and explained the importance of documenting health care wishes. Patient said he would talk to his CNP and let writer know if he needs further assistance. Referral will be closed.

## 2020-10-28 LAB — SARS-COV-2, NAA: NOT DETECTED

## 2020-10-29 ENCOUNTER — ANESTHESIA EVENT (OUTPATIENT)
Dept: OPERATING ROOM | Age: 70
End: 2020-10-29
Payer: MEDICARE

## 2020-10-29 ENCOUNTER — TELEPHONE (OUTPATIENT)
Dept: PRIMARY CARE CLINIC | Age: 70
End: 2020-10-29

## 2020-10-29 RX ORDER — SODIUM CHLORIDE 0.9 % (FLUSH) 0.9 %
10 SYRINGE (ML) INJECTION EVERY 12 HOURS SCHEDULED
Status: CANCELLED | OUTPATIENT
Start: 2020-10-29

## 2020-10-29 RX ORDER — LIDOCAINE HYDROCHLORIDE 10 MG/ML
1 INJECTION, SOLUTION EPIDURAL; INFILTRATION; INTRACAUDAL; PERINEURAL
Status: CANCELLED | OUTPATIENT
Start: 2020-10-29 | End: 2020-10-29

## 2020-10-29 RX ORDER — SODIUM CHLORIDE, SODIUM LACTATE, POTASSIUM CHLORIDE, CALCIUM CHLORIDE 600; 310; 30; 20 MG/100ML; MG/100ML; MG/100ML; MG/100ML
INJECTION, SOLUTION INTRAVENOUS CONTINUOUS
Status: CANCELLED | OUTPATIENT
Start: 2020-10-29

## 2020-10-29 RX ORDER — SODIUM CHLORIDE 0.9 % (FLUSH) 0.9 %
10 SYRINGE (ML) INJECTION PRN
Status: CANCELLED | OUTPATIENT
Start: 2020-10-29

## 2020-10-30 ENCOUNTER — ANESTHESIA (OUTPATIENT)
Dept: OPERATING ROOM | Age: 70
End: 2020-10-30
Payer: MEDICARE

## 2020-10-30 ENCOUNTER — HOSPITAL ENCOUNTER (OUTPATIENT)
Age: 70
Setting detail: OUTPATIENT SURGERY
Discharge: HOME | End: 2020-10-30
Attending: SURGERY | Admitting: SURGERY
Payer: MEDICARE

## 2020-10-30 VITALS
SYSTOLIC BLOOD PRESSURE: 178 MMHG | RESPIRATION RATE: 17 BRPM | DIASTOLIC BLOOD PRESSURE: 97 MMHG | OXYGEN SATURATION: 95 %

## 2020-10-30 VITALS
HEIGHT: 70 IN | OXYGEN SATURATION: 93 % | RESPIRATION RATE: 16 BRPM | DIASTOLIC BLOOD PRESSURE: 69 MMHG | BODY MASS INDEX: 33.33 KG/M2 | HEART RATE: 66 BPM | TEMPERATURE: 96.8 F | SYSTOLIC BLOOD PRESSURE: 114 MMHG | WEIGHT: 232.8 LBS

## 2020-10-30 LAB — GLUCOSE BLD-MCNC: 107 MG/DL (ref 75–110)

## 2020-10-30 PROCEDURE — 3700000000 HC ANESTHESIA ATTENDED CARE: Performed by: SURGERY

## 2020-10-30 PROCEDURE — 7100000010 HC PHASE II RECOVERY - FIRST 15 MIN: Performed by: SURGERY

## 2020-10-30 PROCEDURE — 45378 DIAGNOSTIC COLONOSCOPY: CPT | Performed by: SURGERY

## 2020-10-30 PROCEDURE — 6360000002 HC RX W HCPCS: Performed by: NURSE ANESTHETIST, CERTIFIED REGISTERED

## 2020-10-30 PROCEDURE — 82947 ASSAY GLUCOSE BLOOD QUANT: CPT

## 2020-10-30 PROCEDURE — 2709999900 HC NON-CHARGEABLE SUPPLY: Performed by: SURGERY

## 2020-10-30 PROCEDURE — 3700000001 HC ADD 15 MINUTES (ANESTHESIA): Performed by: SURGERY

## 2020-10-30 PROCEDURE — 7100000011 HC PHASE II RECOVERY - ADDTL 15 MIN: Performed by: SURGERY

## 2020-10-30 PROCEDURE — 6370000000 HC RX 637 (ALT 250 FOR IP): Performed by: NURSE ANESTHETIST, CERTIFIED REGISTERED

## 2020-10-30 PROCEDURE — 3609027000 HC COLONOSCOPY: Performed by: SURGERY

## 2020-10-30 PROCEDURE — 2580000003 HC RX 258: Performed by: NURSE ANESTHETIST, CERTIFIED REGISTERED

## 2020-10-30 RX ORDER — MEPERIDINE HYDROCHLORIDE 50 MG/ML
12.5 INJECTION INTRAMUSCULAR; INTRAVENOUS; SUBCUTANEOUS EVERY 5 MIN PRN
Status: DISCONTINUED | OUTPATIENT
Start: 2020-10-30 | End: 2020-10-30 | Stop reason: HOSPADM

## 2020-10-30 RX ORDER — OXYCODONE HYDROCHLORIDE AND ACETAMINOPHEN 5; 325 MG/1; MG/1
1 TABLET ORAL PRN
Status: DISCONTINUED | OUTPATIENT
Start: 2020-10-30 | End: 2020-10-30 | Stop reason: HOSPADM

## 2020-10-30 RX ORDER — PROPOFOL 10 MG/ML
INJECTION, EMULSION INTRAVENOUS PRN
Status: DISCONTINUED | OUTPATIENT
Start: 2020-10-30 | End: 2020-10-30 | Stop reason: SDUPTHER

## 2020-10-30 RX ORDER — PROMETHAZINE HYDROCHLORIDE 25 MG/ML
12.5 INJECTION, SOLUTION INTRAMUSCULAR; INTRAVENOUS
Status: DISCONTINUED | OUTPATIENT
Start: 2020-10-30 | End: 2020-10-30 | Stop reason: HOSPADM

## 2020-10-30 RX ORDER — ALBUTEROL SULFATE 90 UG/1
AEROSOL, METERED RESPIRATORY (INHALATION) PRN
Status: DISCONTINUED | OUTPATIENT
Start: 2020-10-30 | End: 2020-10-30 | Stop reason: SDUPTHER

## 2020-10-30 RX ORDER — OXYCODONE HYDROCHLORIDE AND ACETAMINOPHEN 5; 325 MG/1; MG/1
2 TABLET ORAL PRN
Status: DISCONTINUED | OUTPATIENT
Start: 2020-10-30 | End: 2020-10-30 | Stop reason: HOSPADM

## 2020-10-30 RX ORDER — ONDANSETRON 2 MG/ML
4 INJECTION INTRAMUSCULAR; INTRAVENOUS
Status: DISCONTINUED | OUTPATIENT
Start: 2020-10-30 | End: 2020-10-30 | Stop reason: HOSPADM

## 2020-10-30 RX ORDER — MORPHINE SULFATE 2 MG/ML
2 INJECTION, SOLUTION INTRAMUSCULAR; INTRAVENOUS EVERY 5 MIN PRN
Status: DISCONTINUED | OUTPATIENT
Start: 2020-10-30 | End: 2020-10-30 | Stop reason: HOSPADM

## 2020-10-30 RX ORDER — DIPHENHYDRAMINE HYDROCHLORIDE 50 MG/ML
12.5 INJECTION INTRAMUSCULAR; INTRAVENOUS
Status: DISCONTINUED | OUTPATIENT
Start: 2020-10-30 | End: 2020-10-30 | Stop reason: HOSPADM

## 2020-10-30 RX ORDER — 0.9 % SODIUM CHLORIDE 0.9 %
500 INTRAVENOUS SOLUTION INTRAVENOUS
Status: DISCONTINUED | OUTPATIENT
Start: 2020-10-30 | End: 2020-10-30 | Stop reason: HOSPADM

## 2020-10-30 RX ORDER — SODIUM CHLORIDE 9 MG/ML
INJECTION, SOLUTION INTRAVENOUS CONTINUOUS PRN
Status: DISCONTINUED | OUTPATIENT
Start: 2020-10-30 | End: 2020-10-30 | Stop reason: SDUPTHER

## 2020-10-30 RX ORDER — LABETALOL 20 MG/4 ML (5 MG/ML) INTRAVENOUS SYRINGE
5 EVERY 10 MIN PRN
Status: DISCONTINUED | OUTPATIENT
Start: 2020-10-30 | End: 2020-10-30 | Stop reason: HOSPADM

## 2020-10-30 RX ADMIN — ALBUTEROL SULFATE 2 PUFF: 90 AEROSOL, METERED RESPIRATORY (INHALATION) at 08:50

## 2020-10-30 RX ADMIN — PROPOFOL 20 MG: 10 INJECTION, EMULSION INTRAVENOUS at 09:08

## 2020-10-30 RX ADMIN — PROPOFOL 20 MG: 10 INJECTION, EMULSION INTRAVENOUS at 08:58

## 2020-10-30 RX ADMIN — SODIUM CHLORIDE: 9 INJECTION, SOLUTION INTRAVENOUS at 08:49

## 2020-10-30 RX ADMIN — PROPOFOL 20 MG: 10 INJECTION, EMULSION INTRAVENOUS at 09:02

## 2020-10-30 RX ADMIN — PROPOFOL 50 MG: 10 INJECTION, EMULSION INTRAVENOUS at 08:50

## 2020-10-30 RX ADMIN — PROPOFOL 20 MG: 10 INJECTION, EMULSION INTRAVENOUS at 09:04

## 2020-10-30 RX ADMIN — PROPOFOL 20 MG: 10 INJECTION, EMULSION INTRAVENOUS at 08:53

## 2020-10-30 RX ADMIN — PROPOFOL 20 MG: 10 INJECTION, EMULSION INTRAVENOUS at 08:55

## 2020-10-30 ASSESSMENT — PULMONARY FUNCTION TESTS
PIF_VALUE: 1
PIF_VALUE: 0
PIF_VALUE: 1

## 2020-10-30 ASSESSMENT — LIFESTYLE VARIABLES: SMOKING_STATUS: 1

## 2020-10-30 ASSESSMENT — PAIN SCALES - GENERAL
PAINLEVEL_OUTOF10: 0

## 2020-10-30 ASSESSMENT — PAIN - FUNCTIONAL ASSESSMENT
PAIN_FUNCTIONAL_ASSESSMENT: 0-10
PAIN_FUNCTIONAL_ASSESSMENT: PREVENTS OR INTERFERES SOME ACTIVE ACTIVITIES AND ADLS

## 2020-10-30 NOTE — ANESTHESIA PRE PROCEDURE
Wheezing  Red in color, hot, wheezing    Pravastatin      Statins cause severe muscle aches.     Statins Depletion Therapy        Problem List:    Patient Active Problem List   Diagnosis Code    Hypertension I10    Hyperlipidemia E78.5    CAD (coronary artery disease) I25.10    COPD (chronic obstructive pulmonary disease) ([facility] [address] J44.9    Osteoarthritis M19.90    Obesity E66.9    Rotator cuff tear M75.100    Shoulder pain, bilateral M25.511, M25.512    Callus of foot L84    DM type 2 (diabetes mellitus, type 2) ([facility] [address] E11.9    Shoulder arthritis M19.019    RUQ pain R10.11    Atrial flutter (HCC) I48.92    Benign prostatic hyperplasia without lower urinary tract symptoms N40.0    Long term use of drug Z79.899    S/P placement of cardiac pacemaker Z95.0    Presence of stent in coronary artery in patient with coronary artery disease I25.10, Z95.5    Gastroesophageal reflux disease K21.9    Anxiety disorder F41.9       Past Medical History:        Diagnosis Date    Anxiety disorder 8/31/2020    Atrial flutter ([facility] [address] 05/17/2018    CAD (coronary artery disease)     Callus of foot     rt     COPD (chronic obstructive pulmonary disease) (HCC)     GERD (gastroesophageal reflux disease)     H/O cardiovascular stress test 09/2017    Hyperlipidemia     Hypertension     Obesity     Osteoarthritis     Poor compliance     Rotator cuff tear     bilat    Shoulder pain, bilateral     Type II or unspecified type diabetes mellitus without mention of complication, not stated as uncontrolled        Past Surgical History:        Procedure Laterality Date    CARDIAC CATHETERIZATION  05/17/2018    CARDIAC CATHETERIZATION  2011    COLONOSCOPY      CORONARY ANGIOPLASTY WITH STENT PLACEMENT  04/23/2009    Mid RCA (Liberte 3.5 x 32) Proximal RCA (4x20)    PACEMAKER INSERTION  07/12/2019    Dr. Faith Mclean         Social History:    Social History     Tobacco Use    Smoking status: Current Some Day Smoker     Packs/day: 0.25     Years: 40.00     Pack years: 10.00     Types: Cigarettes     Last attempt to quit: 2018     Years since quittin.4    Smokeless tobacco: Never Used    Tobacco comment: trying to quit   Substance Use Topics    Alcohol use: Yes     Frequency: 2-3 times a week     Drinks per session: 1 or 2     Binge frequency: Never     Comment: not daily                                Ready to quit: Not Answered  Counseling given: Not Answered  Comment: trying to quit      Vital Signs (Current):   Vitals:    10/30/20 0741   BP: (!) 168/94   Pulse: 76   Resp: 20   Temp: 97 °F (36.1 °C)   TempSrc: Temporal   SpO2: 94%   Weight: 232 lb 12.8 oz (105.6 kg)   Height: 5' 10\" (1.778 m)                                              BP Readings from Last 3 Encounters:   10/30/20 (!) 168/94   20 123/75   20 102/72       NPO Status:                                                                                 BMI:   Wt Readings from Last 3 Encounters:   10/30/20 232 lb 12.8 oz (105.6 kg)   20 236 lb 12.8 oz (107.4 kg)   20 237 lb 9.6 oz (107.8 kg)     Body mass index is 33.4 kg/m².     CBC:   Lab Results   Component Value Date    WBC 6.1 2020    RBC 4.68 2020    RBC 5.53 10/12/2011    HGB 13.6 2020    HCT 42.4 2020    MCV 90.6 2020    RDW 13.9 2020     2020     10/12/2011       CMP:   Lab Results   Component Value Date     2020    K 3.7 2020     2020    CO2 25 2020    BUN 11 2020    CREATININE 0.66 2020    GFRAA >60 2020    LABGLOM >60 2020    GLUCOSE 145 2020    GLUCOSE 115 10/12/2011    PROT 7.0 2020    CALCIUM 8.9 2020    BILITOT 0.32 2020    ALKPHOS 56 2020    AST 18 2020    ALT 18 2020       POC Tests:   Recent Labs     10/30/20  0747   POCGLU 107       Coags:   Lab Results   Component Value Date    PROTIME 11.4 05/17/2018    INR 1.1 05/17/2018    APTT 30.3 05/17/2018       HCG (If Applicable): No results found for: PREGTESTUR, PREGSERUM, HCG, HCGQUANT     ABGs: No results found for: PHART, PO2ART, WMV9BHC, CXQ9BET, BEART, J5FDHXSW     Type & Screen (If Applicable):  No results found for: LABABO, LABRH    Drug/Infectious Status (If Applicable):  Lab Results   Component Value Date    HEPCAB NONREACTIVE 05/26/2015       COVID-19 Screening (If Applicable):   Lab Results   Component Value Date    COVID19 Not Detected 10/26/2020         Anesthesia Evaluation  Patient summary reviewed and Nursing notes reviewed  Airway: Mallampati: III  TM distance: >3 FB   Neck ROM: full  Mouth opening: > = 3 FB Dental:      Comment: -MISSING SOME UPPER AND LOWER TEETH    Pulmonary:normal exam    (+) COPD:  asthma: current smoker                          ROS comment: -SMOKES 1 PPD FOR 47 YEARS   Cardiovascular:    (+) hypertension:, pacemaker: pacemaker, CAD:, CABG/stent:, dysrhythmias: atrial flutter and paced rhythm, GUIDO:,                ROS comment: -PACEMAKER CHECKED 10 DAYS AGO  -VERTIGO     Neuro/Psych:   Negative Neuro/Psych ROS              GI/Hepatic/Renal:   (+) GERD:,           Endo/Other:    (+) Diabetes, : arthritis:., .                  ROS comment: -NPO AFTER MIDNIGHT  -ALLERGIES - SYMBICORT, BARIUM, DYE, STATINS Abdominal:           Vascular:           ROS comment: -ANTICOAGULATED - STOPPED ELIQUIS 2 DAYS AGO. Anesthesia Plan      MAC     ASA 3       Induction: intravenous. MIPS: Postoperative opioids intended and Prophylactic antiemetics administered. Anesthetic plan and risks discussed with patient. Plan discussed with CRNA.     Attending anesthesiologist reviewed and agrees with Pre Eval content              Alberto Farias MD   10/30/2020

## 2020-10-30 NOTE — BRIEF OP NOTE
Brief Postoperative Note      Patient: Camila Weems  YOB: 1950  MRN: [id number]    Date of Procedure: 10/30/2020    Pre-Op Diagnosis: CHANGE IN BOWEL HABITS    Post-Op Diagnosis: diverticulosis       Procedure(s):  COLORECTAL CANCER SCREENING, NOT HIGH RISK    Surgeon(s):  Shaila Louie MD    Assistant:  * No surgical staff found *    Anesthesia: Monitor Anesthesia Care    Estimated Blood Loss (mL): none    Complications: None    Specimens:   * No specimens in log *    Implants:  * No implants in log *      Drains: * No LDAs found *    Findings:     Electronically signed by Shaila Louie MD on 10/30/2020 at 9:07 AM

## 2020-10-30 NOTE — H&P
[facility] [address] [place]           Leonor Monroy   71 y.o.  1950  [id number]      PCP: CHRISTIANA Leon - CNP  Referring Provider: HCRISTIANA Wilson - GAYLA   Author:   Dr. Yadira Villaseñor MD     Reason for Visit:       Chief Complaint   Patient presents with    New Patient       Patient states he is here to discuss having a colonoscopy.         HPI:  Leonor Monroy presents in evaluation for colonoscopy. He is somewhat uncertain as to the timing of his prior colonoscopy although believes it was greater than 5 years ago. He reports recent intermittent episodes of change in bowel habits including significant constipation with abdominal pain as well as an episode of rectal bleeding. This has somewhat improved with the use of laxatives. Family history is negative        Review of Systems   Constitutional: Negative. Respiratory: Negative. Cardiovascular: Negative. Gastrointestinal: Positive for abdominal pain, blood in stool and constipation. Musculoskeletal: Negative. Neurological: Negative. Allergies: Allergies   Allergen Reactions    Symbicort [Budesonide-Formoterol Fumarate] Swelling       Patient reports throat swelling.  Barium-Containing Compounds         \"had a reaction to a test, I think it was barium\"    Dye  [Iodides]         Other reaction(s): Wheezing  Red in color, hot, wheezing    Pravastatin         Statins cause severe muscle aches.     Statins Depletion Therapy           Current Medications:  Current Facility-Administered Medications          Current Outpatient Medications   Medication Sig Dispense Refill    SUPREP BOWEL PREP KIT 17.5-3.13-1.6 GM/177ML SOLN Take as directed - dispense one Kit 1 kit 0    docusate sodium (COLACE) 100 MG capsule Take 1 capsule by mouth 2 times daily 180 capsule 1    magnesium citrate solution Take 1/2 bottle now and then repeat in 8 hours if no results 296 mL 0    varenicline (CHANTIX CONTINUING MONTH JESUS) 1 MG tablet Take 1 tablet by mouth 2 times daily 60 tablet 3    omeprazole (PRILOSEC) 20 MG delayed release capsule take 1 capsule by mouth every morning before breakfast 90 capsule 1    ezetimibe (ZETIA) 10 MG tablet Take 1 tablet by mouth daily 90 tablet 1    lisinopril (PRINIVIL;ZESTRIL) 5 MG tablet Take 1 tablet by mouth 2 times daily 60 tablet 2    tamsulosin (FLOMAX) 0.4 MG capsule Take one capsule by mouth once daily 30 capsule 3    cyclobenzaprine (FLEXERIL) 10 MG tablet take 1 tablet by mouth every 8 hours if needed 60 tablet 0    metFORMIN (GLUCOPHAGE) 500 MG tablet 1 po with breakfast, 2 po with evening meal. 270 tablet 1    fluticasone-salmeterol (ADVAIR) 250-50 MCG/DOSE AEPB Inhale 1 puff into the lungs every 12 hours 60 each 3    albuterol sulfate  (90 Base) MCG/ACT inhaler Inhale 2 puffs into the lungs every 6 hours as needed for Shortness of Breath 1 Inhaler 3    albuterol (PROVENTIL) (2.5 MG/3ML) 0.083% nebulizer solution Take 3 mLs by nebulization every 6 hours as needed for Wheezing or Shortness of Breath 120 each 3    ipratropium-albuterol (DUONEB) 0.5-2.5 (3) MG/3ML SOLN nebulizer solution Inhale 3 mLs into the lungs every 4 hours 100 vial 2    apixaban (ELIQUIS) 5 MG TABS tablet Take 1 tablet by mouth 2 times daily 60 tablet 3    nitroGLYCERIN (NITROSTAT) 0.4 MG SL tablet Place 1 tablet under the tongue every 5 minutes as needed for Chest pain 10 tablet 5    Blood Glucose Monitoring Suppl (TRUE METRIX AIR GLUCOSE METER) w/Device KIT 1 kit by Does not apply route 2 times daily 1 kit 0    blood glucose test strips (ASCENSIA AUTODISC VI;ONE TOUCH ULTRA TEST VI) strip 1 each by Other route 2 times daily As needed.  100 each 2    ranolazine (RANEXA) 500 MG extended release tablet Take 500 mg by mouth 2 times daily        Blood Pressure Monitoring (BLOOD PRESSURE CUFF) MISC 1 each by Does not apply route daily as needed (monitoring) 1 each 0    Glucose Blood (BLOOD GLUCOSE TEST STRIPS) STRP Testing qd -bid prn 100 strip 11    metoclopramide (REGLAN) 5 MG tablet Take 1 tablet by mouth 2 times daily 6 tablet 0    mupirocin (BACTROBAN) 2 % ointment Apply topically 3 times daily to affected area (Patient not taking: Reported on 9/23/2020) 30 g 0      No current facility-administered medications for this visit.            Problem List:      Patient Active Problem List   Diagnosis    Hypertension    Hyperlipidemia    CAD (coronary artery disease)    COPD (chronic obstructive pulmonary disease) ([facility] [address]    Osteoarthritis    Obesity    Rotator cuff tear    Shoulder pain, bilateral    Callus of foot    DM type 2 (diabetes mellitus, type 2) ([facility])    Shoulder arthritis    RUQ pain    Atrial flutter ([facility])    Benign prostatic hyperplasia without lower urinary tract symptoms    Long term use of drug    S/P placement of cardiac pacemaker    Presence of stent in coronary artery in patient with coronary artery disease    Gastroesophageal reflux disease    Anxiety disorder         Histories:  Past Medical History   Past Medical History:   Diagnosis Date    Anxiety disorder 8/31/2020    Atrial flutter ([facility] 05/17/2018    CAD (coronary artery disease)      Callus of foot       rt     COPD (chronic obstructive pulmonary disease) ([facility])      GERD (gastroesophageal reflux disease)      H/O cardiovascular stress test 09/2017    Hyperlipidemia      Hypertension      Obesity      Osteoarthritis      Poor compliance      Rotator cuff tear       bilat    Shoulder pain, bilateral      Type II or unspecified type diabetes mellitus without mention of complication, not stated as uncontrolled           Family History         Family History   Problem Relation Age of Onset    Diabetes Other      Heart Disease Other      High Blood Pressure Other      Cancer Other      COPD Other      Arthritis Other           Past Surgical History         Past Surgical History:   Procedure Laterality Date    CARDIAC CATHETERIZATION   2018    CARDIAC CATHETERIZATION   2011    CORONARY ANGIOPLASTY WITH STENT PLACEMENT   2009     Mid RCA (Liberte 3.5 x 32) Proximal RCA (4x20)    PACEMAKER INSERTION   2019     Dr. Jojo Ma         Social History   Social History            Socioeconomic History    Marital status:        Spouse name: None    Number of children: None    Years of education: None    Highest education level: None   Occupational History    None   Social Needs    Financial resource strain: Not very hard    Food insecurity       Worry: Never true       Inability: Never true    Transportation needs       Medical: No       Non-medical: No   Tobacco Use    Smoking status: Current Some Day Smoker       Packs/day: 0.25       Years: 40.00       Pack years: 10.00       Types: Cigarettes       Last attempt to quit: 2018       Years since quittin.3    Smokeless tobacco: Never Used   Substance and Sexual Activity    Alcohol use: Yes       Alcohol/week: 4.0 standard drinks       Types: 4 Cans of beer per week       Frequency: 2-3 times a week       Drinks per session: 1 or 2       Binge frequency: Never    Drug use: Yes       Types: Marijuana    Sexual activity: Not Currently   Lifestyle    Physical activity       Days per week: None       Minutes per session: None    Stress:  To some extent   Relationships    Social connections       Talks on phone: None       Gets together: None       Attends [employer] service: None       Active member of club or organization: None       Attends meetings of clubs or organizations: None       Relationship status: None    Intimate partner violence       Fear of current or ex partner: None       Emotionally abused: None       Physically abused: None       Forced sexual activity: None   Other Topics Concern    None   Social History Narrative    None            Physical Examination:  /75 (Site: Left Upper Arm, Position: Sitting, Cuff Size: Medium Adult)   Pulse 75   Ht 5' 11\" (1.803 m)   Wt 236 lb 12.8 oz (107.4 kg)   SpO2 95%   BMI 33.03 kg/m²      Neck: Supple  Respiratory:  Lungs are clear to auscultation  Cardiovascular:  Normal Rate and Rhythm  Gastrointestinal:    Abdomen:  Soft, non-tender, no masses   Rectum/Anus:  Deferred to procedure  Integumentary:  Warm and dry  Neurologic:  Alert      Impression and Plan:    Diagnoses:      Diagnosis Orders   1. Change in bowel habits         Plan:  Colonoscopy. The risks, benefits, options and potential complications of the procedure were discussed in detail with the patient and they agreed to proceed and consent was signed. Preoperative bowel prep instructions given to the patient. As patient has pacemaker and is on Eliquis we will obtain cardiology recommendations prior to the procedure.

## 2020-10-30 NOTE — OP NOTE
Operative Note      Patient: Mario Santana  YOB: 1950  MRN: [id number]    Date of Procedure: 10/30/2020      Pre-Op Diagnosis: Change in bowel habits    Post-Op Diagnosis: Diverticulosis    Procedure: Colonoscopy     Anesthesia: MAC    EBL: none    Speciman: none    Clinical Indications: Patient presents for colonoscopy as he reports a change in bowel habits with ongoing constipation as well as apparent episodes of rectal bleeding. Of note, patient is on chronic anticoagulation. Risks, benefits, options and potential complications of the procedure were discussed in detail and they agree to proceed and consent signed. Details of Procedure: The patient was brought to the colonoscopy suite and placed in the left lateral decubitus position. Under MAC the fiberoptic colonoscope was introduced into the rectum. This was subsequently advanced through the colon to the cecum. The cecal landmarks were identified. The bowel prep was good. Gradual withdrawal of the colonoscope was then undertaken. No vascular, polypoid or mucosal lesions were noted throughout the entire length of the colon. In the descending colon there were several scattered diverticuli. No blood or bleeding was noted. The anorectal canal was unremarkable. The colon was decompressed and the procedure was ended. The patient tolerated the procedure well and was transferred to the recovery area in stable condition.     Electronically signed by Eagle Partida MD on 10/30/2020 at 9:48 AM

## 2020-10-30 NOTE — ANESTHESIA POSTPROCEDURE EVALUATION
Department of Anesthesiology  Postprocedure Note    Patient: Polly Gamboa  MRN: [id number]  YOB: 1950  Date of evaluation: 10/30/2020  Time:  9:27 AM     Procedure Summary     Date:  10/30/20 Room / Location:  [facility] / [address] [facility]    Anesthesia Start:  7273 Anesthesia Stop:  9629    Procedure:  COLORECTAL CANCER SCREENING, NOT HIGH RISK (N/A ) Diagnosis:  (CHANGE IN BOWEL HABITS)    Surgeon: Coty Weldon MD Responsible Provider:  Sherlyn Blizzard, APRN - CRNA    Anesthesia Type:  MAC ASA Status:  3          Anesthesia Type: MAC    Adarsh Phase I: Adarsh Score: 5    Adarsh Phase II:      Last vitals: Reviewed and per EMR flowsheets.        Anesthesia Post Evaluation    Patient location during evaluation: PACU  Patient participation: complete - patient participated  Level of consciousness: awake and alert  Airway patency: patent  Nausea & Vomiting: no nausea and no vomiting  Complications: no  Cardiovascular status: hemodynamically stable  Respiratory status: face mask and spontaneous ventilation  Hydration status: euvolemic

## 2020-12-28 ENCOUNTER — TELEPHONE (OUTPATIENT)
Dept: VASCULAR SURGERY | Age: 70
End: 2020-12-28

## 2020-12-28 NOTE — TELEPHONE ENCOUNTER
Patient requested to have the appt earlier, so I rescheduled him. He stated he is ok with the testing appt time. Per patient's request, I sent him a [email or web address] message with the appt detailed and mailed him a appt reminder letter.

## 2020-12-28 NOTE — TELEPHONE ENCOUNTER
Called patient to schedule follow up and testing. He stated he was driving and wanted a call back in about 15 mins. I scheduled his appt and testing, pending if he is available.      Appt:  at 11:15am Sonia Delgado  Testin21 at 11am with an arrival of 10:45am. ([facility])

## 2020-12-30 ENCOUNTER — NURSE TRIAGE (OUTPATIENT)
Dept: OTHER | Facility: CLINIC | Age: 70
End: 2020-12-30

## 2020-12-30 NOTE — TELEPHONE ENCOUNTER
Reason for Disposition   Abdominal pain is a chronic symptom (recurrent or ongoing AND lasting > 4 weeks)    Answer Assessment - Initial Assessment Questions  1. LOCATION: \"Where does it hurt? \"       Low on abdomen, middle    2. RADIATION: \"Does the pain shoot anywhere else? \" (e.g., chest, back)      Denies    3. ONSET: \"When did the pain begin? \" (Minutes, hours or days ago)       A month ago    4. SUDDEN: \"Gradual or sudden onset? \"      Gradual onset    5. PATTERN \"Does the pain come and go, or is it constant? \"     - If constant: \"Is it getting better, staying the same, or worsening? \"       (Note: Constant means the pain never goes away completely; most serious pain is constant and it progresses)      - If intermittent: \"How long does it last?\" \"Do you have pain now? \"      (Note: Intermittent means the pain goes away completely between bouts)      Pain is constant    6. SEVERITY: \"How bad is the pain? \"  (e.g., Scale 1-10; mild, moderate, or severe)     - MILD (1-3): doesn't interfere with normal activities, abdomen soft and not tender to touch      - MODERATE (4-7): interferes with normal activities or awakens from sleep, tender to touch      - SEVERE (8-10): excruciating pain, doubled over, unable to do any normal activities        4 or 5/10    7. RECURRENT SYMPTOM: \"Have you ever had this type of abdominal pain before? \" If so, ask: \"When was the last time? \" and \"What happened that time? \"       Yes. Had colonoscopy 2 or 3 months ago. Having same type of pain. 8. CAUSE: \"What do you think is causing the abdominal pain? \"      Unsure    9. RELIEVING/AGGRAVATING FACTORS: \"What makes it better or worse? \" (e.g., movement, antacids, bowel movement)      When laying flat on back pain feels better    10. OTHER SYMPTOMS: \"Has there been any vomiting, diarrhea, constipation, or urine problems? \"        Gas (build up at night), some constipation, some diarrhea    Protocols used: ABDOMINAL PAIN - MALE-ADULT-[place]    Patient called pre-service center [facility]) [place] with red flag complaint. Spoke with Emilee Mratinez. Brief description of triage: See above. Triage indicates for patient to See above. Care advice provided, patient verbalizes understanding; denies any other questions or concerns; instructed to call back for any new or worsening symptoms. Writer provided warm transfer [facility] Em[facility] Martinez. [facility]. Attention Provider: Thank you for allowing me to participate in the care of your patient. The patient was connected to triage in response to information provided to the [facility]. Please do not respond through this encounter as the response is not directed to a shared pool.

## 2021-01-15 ENCOUNTER — HOSPITAL ENCOUNTER (OUTPATIENT)
Age: 71
Setting detail: SPECIMEN
Discharge: HOME | End: 2021-01-15
Payer: MEDICARE

## 2021-01-15 DIAGNOSIS — K21.9 GASTROESOPHAGEAL REFLUX DISEASE WITHOUT ESOPHAGITIS: ICD-10-CM

## 2021-01-15 DIAGNOSIS — G89.29 CHRONIC ABDOMINAL PAIN: ICD-10-CM

## 2021-01-15 DIAGNOSIS — I10 ESSENTIAL HYPERTENSION: ICD-10-CM

## 2021-01-15 DIAGNOSIS — R14.0 ABDOMINAL BLOATING: ICD-10-CM

## 2021-01-15 DIAGNOSIS — E11.9 TYPE 2 DIABETES MELLITUS WITHOUT COMPLICATION, WITHOUT LONG-TERM CURRENT USE OF INSULIN (HCC): ICD-10-CM

## 2021-01-15 DIAGNOSIS — K52.9 CHRONIC DIARRHEA: ICD-10-CM

## 2021-01-15 DIAGNOSIS — R10.9 CHRONIC ABDOMINAL PAIN: ICD-10-CM

## 2021-01-15 LAB
ABSOLUTE EOS #: 0.13 K/UL (ref 0–0.44)
ABSOLUTE IMMATURE GRANULOCYTE: 0.04 K/UL (ref 0–0.3)
ABSOLUTE LYMPH #: 2.07 K/UL (ref 1.1–3.7)
ABSOLUTE MONO #: 0.57 K/UL (ref 0.1–1.2)
ALBUMIN SERPL-MCNC: 3.9 G/DL (ref 3.5–5.2)
ALBUMIN/GLOBULIN RATIO: 1.3 (ref 1–2.5)
ALP BLD-CCNC: 55 U/L (ref 40–129)
ALT SERPL-CCNC: 15 U/L (ref 5–41)
AMYLASE: 33 U/L (ref 28–100)
ANION GAP SERPL CALCULATED.3IONS-SCNC: 10 MMOL/L (ref 9–17)
AST SERPL-CCNC: 17 U/L
BASOPHILS # BLD: 0 % (ref 0–2)
BASOPHILS ABSOLUTE: 0.03 K/UL (ref 0–0.2)
BILIRUB SERPL-MCNC: 0.49 MG/DL (ref 0.3–1.2)
BUN BLDV-MCNC: 9 MG/DL (ref 8–23)
BUN/CREAT BLD: ABNORMAL (ref 9–20)
CALCIUM SERPL-MCNC: 8.9 MG/DL (ref 8.6–10.4)
CHLORIDE BLD-SCNC: 104 MMOL/L (ref 98–107)
CHOLESTEROL/HDL RATIO: 2.3
CHOLESTEROL: 91 MG/DL
CO2: 28 MMOL/L (ref 20–31)
CREAT SERPL-MCNC: 0.67 MG/DL (ref 0.7–1.2)
DIFFERENTIAL TYPE: ABNORMAL
EOSINOPHILS RELATIVE PERCENT: 2 % (ref 1–4)
ESTIMATED AVERAGE GLUCOSE: 146 MG/DL
GFR AFRICAN AMERICAN: >60 ML/MIN
GFR NON-AFRICAN AMERICAN: >60 ML/MIN
GFR SERPL CREATININE-BSD FRML MDRD: ABNORMAL ML/MIN/{1.73_M2}
GFR SERPL CREATININE-BSD FRML MDRD: ABNORMAL ML/MIN/{1.73_M2}
GLUCOSE BLD-MCNC: 107 MG/DL (ref 70–99)
HBA1C MFR BLD: 6.7 % (ref 4–6)
HCT VFR BLD CALC: 48 % (ref 40.7–50.3)
HDLC SERPL-MCNC: 40 MG/DL
HEMOGLOBIN: 15.6 G/DL (ref 13–17)
IMMATURE GRANULOCYTES: 1 %
LDL CHOLESTEROL: 30 MG/DL (ref 0–130)
LYMPHOCYTES # BLD: 30 % (ref 24–43)
MAGNESIUM: 1.3 MG/DL (ref 1.6–2.6)
MCH RBC QN AUTO: 28.8 PG (ref 25.2–33.5)
MCHC RBC AUTO-ENTMCNC: 32.5 G/DL (ref 28.4–34.8)
MCV RBC AUTO: 88.7 FL (ref 82.6–102.9)
MONOCYTES # BLD: 8 % (ref 3–12)
NRBC AUTOMATED: 0 PER 100 WBC
PDW BLD-RTO: 15.1 % (ref 11.8–14.4)
PLATELET # BLD: 206 K/UL (ref 138–453)
PLATELET ESTIMATE: ABNORMAL
PMV BLD AUTO: 9.7 FL (ref 8.1–13.5)
POTASSIUM SERPL-SCNC: 3.4 MMOL/L (ref 3.7–5.3)
RBC # BLD: 5.41 M/UL (ref 4.21–5.77)
RBC # BLD: ABNORMAL 10*6/UL
SEG NEUTROPHILS: 59 % (ref 36–65)
SEGMENTED NEUTROPHILS ABSOLUTE COUNT: 4.11 K/UL (ref 1.5–8.1)
SODIUM BLD-SCNC: 142 MMOL/L (ref 135–144)
TOTAL PROTEIN: 6.8 G/DL (ref 6.4–8.3)
TRIGL SERPL-MCNC: 103 MG/DL
TSH SERPL DL<=0.05 MIU/L-ACNC: 0.73 MIU/L (ref 0.3–5)
VLDLC SERPL CALC-MCNC: ABNORMAL MG/DL (ref 1–30)
WBC # BLD: 7 K/UL (ref 3.5–11.3)
WBC # BLD: ABNORMAL 10*3/UL

## 2021-01-17 LAB — HEPATITIS BE ANTIGEN: NEGATIVE

## 2021-01-18 ENCOUNTER — HOSPITAL ENCOUNTER (OUTPATIENT)
Dept: VASCULAR LAB | Age: 71
Discharge: HOME | End: 2021-01-18
Payer: MEDICARE

## 2021-01-18 DIAGNOSIS — I65.23 BILATERAL CAROTID ARTERY STENOSIS: ICD-10-CM

## 2021-01-18 LAB — TISSUE TRANSGLUTAMINASE IGA: 0.6 U/ML

## 2021-01-18 PROCEDURE — 93880 EXTRACRANIAL BILAT STUDY: CPT

## 2021-01-19 ENCOUNTER — HOSPITAL ENCOUNTER (OUTPATIENT)
Age: 71
Setting detail: SPECIMEN
Discharge: HOME | End: 2021-01-19
Payer: MEDICARE

## 2021-01-19 DIAGNOSIS — R14.0 ABDOMINAL BLOATING: ICD-10-CM

## 2021-01-19 DIAGNOSIS — R10.9 CHRONIC ABDOMINAL PAIN: ICD-10-CM

## 2021-01-19 DIAGNOSIS — G89.29 CHRONIC ABDOMINAL PAIN: ICD-10-CM

## 2021-01-19 DIAGNOSIS — K52.9 CHRONIC DIARRHEA: ICD-10-CM

## 2021-01-19 DIAGNOSIS — K21.9 GASTROESOPHAGEAL REFLUX DISEASE WITHOUT ESOPHAGITIS: ICD-10-CM
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
## 2021-01-20 LAB
CAMPYLOBACTER PCR: NORMAL
DIRECT EXAM: NEGATIVE
E COLI ENTEROTOXIGENIC PCR: NORMAL
Lab: NORMAL
PLESIOMONAS SHIGELLOIDES PCR: NORMAL
SALMONELLA PCR: NORMAL
SHIGATOXIN GENE PCR: NORMAL
SHIGELLA SP PCR: NORMAL
SPECIMEN DESCRIPTION: NORMAL
SPECIMEN DESCRIPTION: NORMAL
VIBRIO PCR: NORMAL
YERSINIA ENTEROCOLITICA PCR: NORMAL

## 2021-01-23 LAB — FECAL PANCREATIC ELASTASE-1: >800 UG/G

## 2021-01-25 ENCOUNTER — OFFICE VISIT (OUTPATIENT)
Dept: VASCULAR SURGERY | Age: 71
End: 2021-01-25
Payer: MEDICARE

## 2021-01-25 VITALS
HEART RATE: 92 BPM | TEMPERATURE: 96.8 F | DIASTOLIC BLOOD PRESSURE: 73 MMHG | OXYGEN SATURATION: 98 % | HEIGHT: 71 IN | BODY MASS INDEX: 32.9 KG/M2 | WEIGHT: 235 LBS | SYSTOLIC BLOOD PRESSURE: 147 MMHG | RESPIRATION RATE: 18 BRPM

## 2021-01-25 DIAGNOSIS — I65.23 CAROTID STENOSIS, ASYMPTOMATIC, BILATERAL: Primary | ICD-10-CM

## 2021-01-25 PROCEDURE — 3017F COLORECTAL CA SCREEN DOC REV: CPT | Performed by: SURGERY

## 2021-01-25 PROCEDURE — 99214 OFFICE O/P EST MOD 30 MIN: CPT | Performed by: SURGERY

## 2021-01-25 PROCEDURE — 1036F TOBACCO NON-USER: CPT | Performed by: SURGERY

## 2021-01-25 PROCEDURE — G8417 CALC BMI ABV UP PARAM F/U: HCPCS | Performed by: SURGERY

## 2021-01-25 PROCEDURE — 4040F PNEUMOC VAC/ADMIN/RCVD: CPT | Performed by: SURGERY

## 2021-01-25 PROCEDURE — G8427 DOCREV CUR MEDS BY ELIG CLIN: HCPCS | Performed by: SURGERY

## 2021-01-25 PROCEDURE — G8484 FLU IMMUNIZE NO ADMIN: HCPCS | Performed by: SURGERY

## 2021-01-25 PROCEDURE — 1123F ACP DISCUSS/DSCN MKR DOCD: CPT | Performed by: SURGERY

## 2021-01-25 ASSESSMENT — ENCOUNTER SYMPTOMS
ABDOMINAL PAIN: 0
COLOR CHANGE: 0
DIARRHEA: 1
ALLERGIC/IMMUNOLOGIC NEGATIVE: 1
SHORTNESS OF BREATH: 0
CHEST TIGHTNESS: 0
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
## 2021-01-29 ENCOUNTER — HOSPITAL ENCOUNTER (OUTPATIENT)
Dept: GENERAL RADIOLOGY | Facility: CLINIC | Age: 71
Discharge: HOME | End: 2021-01-31
Payer: MEDICARE

## 2021-01-29 ENCOUNTER — HOSPITAL ENCOUNTER (OUTPATIENT)
Facility: CLINIC | Age: 71
Discharge: HOME | End: 2021-01-31
Payer: MEDICARE

## 2021-01-29 DIAGNOSIS — R07.81 RIB PAIN ON RIGHT SIDE: ICD-10-CM

## 2021-01-29 DIAGNOSIS — S20.211A CONTUSION OF RIB ON RIGHT SIDE, INITIAL ENCOUNTER: ICD-10-CM

## 2021-01-29 PROCEDURE — 71101 X-RAY EXAM UNILAT RIBS/CHEST: CPT

## 2021-05-24 PROBLEM — R25.1 TREMOR, UNSPECIFIED: Status: ACTIVE | Noted: 2021-05-24

## 2021-06-04 ENCOUNTER — OFFICE VISIT (OUTPATIENT)
Dept: GASTROENTEROLOGY | Age: 71
End: 2021-06-04
Payer: MEDICARE

## 2021-06-04 VITALS — WEIGHT: 232 LBS | BODY MASS INDEX: 32.36 KG/M2

## 2021-06-04 DIAGNOSIS — R14.0 BLOATING: ICD-10-CM

## 2021-06-04 DIAGNOSIS — K21.9 GASTROESOPHAGEAL REFLUX DISEASE, UNSPECIFIED WHETHER ESOPHAGITIS PRESENT: Primary | ICD-10-CM

## 2021-06-04 PROCEDURE — 3017F COLORECTAL CA SCREEN DOC REV: CPT | Performed by: INTERNAL MEDICINE

## 2021-06-04 PROCEDURE — 4040F PNEUMOC VAC/ADMIN/RCVD: CPT | Performed by: INTERNAL MEDICINE

## 2021-06-04 PROCEDURE — 99213 OFFICE O/P EST LOW 20 MIN: CPT | Performed by: INTERNAL MEDICINE

## 2021-06-04 PROCEDURE — G8427 DOCREV CUR MEDS BY ELIG CLIN: HCPCS | Performed by: INTERNAL MEDICINE

## 2021-06-04 PROCEDURE — 1123F ACP DISCUSS/DSCN MKR DOCD: CPT | Performed by: INTERNAL MEDICINE

## 2021-06-04 PROCEDURE — G8417 CALC BMI ABV UP PARAM F/U: HCPCS | Performed by: INTERNAL MEDICINE

## 2021-06-04 PROCEDURE — 1036F TOBACCO NON-USER: CPT | Performed by: INTERNAL MEDICINE

## 2021-06-04 ASSESSMENT — ENCOUNTER SYMPTOMS
BLOOD IN STOOL: 0
VOMITING: 0
CHOKING: 0
RECTAL PAIN: 0
SORE THROAT: 0
VOICE CHANGE: 0
DIARRHEA: 0
NAUSEA: 0
TROUBLE SWALLOWING: 0
SINUS PRESSURE: 0
ABDOMINAL PAIN: 1
ALLERGIC/IMMUNOLOGIC NEGATIVE: 1
SHORTNESS OF BREATH: 1
ABDOMINAL DISTENTION: 1
WHEEZING: 1
ANAL BLEEDING: 0
BACK PAIN: 1
COUGH: 1
CONSTIPATION: 0

## 2021-06-04 NOTE — PROGRESS NOTES
Reason for Referral:   Tiffanie Rivera, APRN - CNP  [address] [address]    Chief Complaint   Patient presents with   Melinda Kaur     Started 2 months ago and was having issues with stomach bloating badly  he couldn't burp or pass gas and terrible abdominal pain. HISTORY OF PRESENT ILLNESS: Thao Pierson is a 79 y.o. male , referred for evaluation of abdominal bloating and discomfort. He has had issues for a few months. He passes a lot of flatus. Bowels moving okay. No blood in stool or melena. Reports normal colonoscopy earlier this year. No EGD. He started omeprazole. This is helping some. No weight loss. He continues to smoke. He has COPD. He has coronary artery disease. He gets nitroglycerin as needed for chest pain. No no family history of GI pathology. Past Medical,Family, and Social History reviewed and does not contribute to the patient presentingcondition. Patient's PMH/PSH,SH,PSYCH Hx, MEDs, ALLERGIES, and ROS were all reviewed and updated in the appropriate sections.     PAST MEDICAL HISTORY:  Past Medical History:   Diagnosis Date    Anxiety disorder 8/31/2020    Atrial flutter ([address] 05/17/2018    CAD (coronary artery disease)     Callus of foot     rt     COPD (chronic obstructive pulmonary disease) (HCC)     GERD (gastroesophageal reflux disease)     H/O cardiovascular stress test 09/2017    Hyperlipidemia     Hypertension     MI (myocardial infarction) ([address]     Obesity     Osteoarthritis     Poor compliance     Rotator cuff tear     bilat    Shoulder pain, bilateral     Type II or unspecified type diabetes mellitus without mention of complication, not stated as uncontrolled        Past Surgical History:   Procedure Laterality Date    CARDIAC CATHETERIZATION  05/17/2018    CARDIAC CATHETERIZATION  2011    COLONOSCOPY      COLONOSCOPY  10/30/2020    COLONOSCOPY N/A 10/30/2020    COLORECTAL CANCER SCREENING, NOT HIGH RISK performed by Adrien Engle MD [address] [facility]  04/23/2009    Mid RCA (Liberte 3.5 x 32) Proximal RCA (4x20)    PACEMAKER INSERTION  07/12/2019    Dr. Aylin Syed:    Current Outpatient Medications:     lisinopril (PRINIVIL;ZESTRIL) 5 MG tablet, take 1 tablet by mouth twice a day, Disp: 180 tablet, Rfl: 0    cyclobenzaprine (FLEXERIL) 10 MG tablet, take 1 tablet by mouth every 8 hours if needed, Disp: 60 tablet, Rfl: 0    ipratropium-albuterol (DUONEB) 0.5-2.5 (3) MG/3ML SOLN nebulizer solution, Inhale 3 mLs into the lungs every 4 hours, Disp: 100 vial, Rfl: 2    omeprazole (PRILOSEC) 20 MG delayed release capsule, take 1 capsule by mouth every morning before breakfast, Disp: 90 capsule, Rfl: 1    Evolocumab (REPATHA SC), Inject into the skin, Disp: , Rfl:     metFORMIN (GLUCOPHAGE) 500 MG tablet, take 1 tablet by mouth WITH BREAKFAST then 2 tablets WITH EVENING MEAL, Disp: 270 tablet, Rfl: 1    tamsulosin (FLOMAX) 0.4 MG capsule, take 1 capsule by mouth once daily, Disp: 120 capsule, Rfl: 1    albuterol sulfate  (90 Base) MCG/ACT inhaler, Inhale 2 puffs into the lungs every 6 hours as needed for Shortness of Breath, Disp: 1 Inhaler, Rfl: 3    albuterol (PROVENTIL) (2.5 MG/3ML) 0.083% nebulizer solution, Take 3 mLs by nebulization every 6 hours as needed for Wheezing or Shortness of Breath, Disp: 120 each, Rfl: 3    apixaban (ELIQUIS) 5 MG TABS tablet, Take 1 tablet by mouth 2 times daily, Disp: 60 tablet, Rfl: 3    nitroGLYCERIN (NITROSTAT) 0.4 MG SL tablet, Place 1 tablet under the tongue every 5 minutes as needed for Chest pain, Disp: 10 tablet, Rfl: 5    Blood Glucose Monitoring Suppl (TRUE METRIX AIR GLUCOSE METER) w/Device KIT, 1 kit by Does not apply route 2 times daily, Disp: 1 kit, Rfl: 0    blood glucose test strips (ASCENSIA AUTODISC VI;ONE TOUCH ULTRA TEST VI) strip, 1 each by Other at all   Food Insecurity: No Food Insecurity    Worried About [facility] in the Last Year: Never true    Artur of Food in the Last Year: Never true   Transportation Needs:     Lack of Transportation (Medical):  Lack of Transportation (Non-Medical):    Physical Activity:     Days of Exercise per Week:     Minutes of Exercise per Session:    Stress:     Feeling of Stress :    Social Connections:     Frequency of Communication with Friends and Family:     Frequency of Social Gatherings with Friends and Family:     Attends [employer]ist Services:     Active Member of Clubs or Organizations:     Attends Club or Organization Meetings:     Marital Status:    Intimate Partner Violence:     Fear of Current or Ex-Partner:     Emotionally Abused:     Physically Abused:     Sexually Abused:        REVIEW OF SYSTEMS: A 12-point review of systemswas obtained and pertinent positives and negatives were enumerated above in the history of present illness. All other reviewed systems / symptoms were negative. Review of Systems   Constitutional: Positive for fatigue. Negative for appetite change and unexpected weight change. HENT: Negative for dental problem, postnasal drip, sinus pressure, sore throat, trouble swallowing and voice change. Eyes: Positive for visual disturbance. Respiratory: Positive for cough, shortness of breath and wheezing. Negative for choking. COPD   Cardiovascular: Negative. Negative for chest pain, palpitations and leg swelling. Gastrointestinal: Positive for abdominal distention and abdominal pain. Negative for anal bleeding, blood in stool, constipation, diarrhea, nausea, rectal pain and vomiting. Endocrine: Negative. Genitourinary: Negative. Negative for difficulty urinating. Musculoskeletal: Positive for arthralgias and back pain. Negative for gait problem and myalgias. Allergic/Immunologic: Negative.   Negative for environmental allergies and food allergies. Neurological: Negative. Negative for dizziness, weakness, light-headedness, numbness and headaches. Hematological: Negative. Does not bruise/bleed easily. Psychiatric/Behavioral: Positive for sleep disturbance. The patient is nervous/anxious. LABORATORY DATA: Reviewed  Lab Results   Component Value Date    WBC 7.0 01/15/2021    HGB 15.6 01/15/2021    HCT 48.0 01/15/2021    MCV 88.7 01/15/2021     01/15/2021     01/15/2021    K 3.4 (L) 01/15/2021     01/15/2021    CO2 28 01/15/2021    BUN 9 01/15/2021    CREATININE 0.67 (L) 01/15/2021    LABPROT 7.1 06/18/2012    LABALBU 3.9 01/15/2021    BILITOT 0.49 01/15/2021    ALKPHOS 55 01/15/2021    AST 17 01/15/2021    ALT 15 01/15/2021    INR 1.1 05/17/2018         Lab Results   Component Value Date    RBC 5.41 01/15/2021    HGB 15.6 01/15/2021    MCV 88.7 01/15/2021    MCH 28.8 01/15/2021    MCHC 32.5 01/15/2021    RDW 15.1 (H) 01/15/2021    MPV 9.7 01/15/2021    BASOPCT 0 01/15/2021    LYMPHSABS 2.07 01/15/2021    MONOSABS 0.57 01/15/2021    NEUTROABS 4.11 01/15/2021    EOSABS 0.13 01/15/2021    BASOSABS 0.03 01/15/2021         DIAGNOSTIC TESTING:     No results found. There were no vitals taken for this visit. PHYSICAL EXAMINATION: Vital signs reviewed per the nursing documentation. There is no height or weight on file to calculate BMI. Physical Exam      I personally reviewed the nurse's notes and documentation and I agree with her notes. General: alert, appears stated age and cooperative Psych: Normal. and Alert and oriented, appropriate affect. . Normal affect. Mentation normal  HEENT: PERRLA. Clear conjunctivae and sclerae. Moist oral mucosae, no lesions or ulcers. The neck is supple, without lymphadenopathy or jugular venous distension. No masses. Normal thyroid. Cardiovascular: S1 S2 RRR no rubs or murmurs. Pulmonary: clear BL. No accessory muscle usage.   Abdominal Exam: Soft, NT ND, no hepato

## 2021-07-16 ENCOUNTER — OFFICE VISIT (OUTPATIENT)
Dept: GASTROENTEROLOGY | Age: 71
End: 2021-07-16
Payer: MEDICARE

## 2021-07-16 VITALS — WEIGHT: 237 LBS | BODY MASS INDEX: 33.05 KG/M2

## 2021-07-16 DIAGNOSIS — R14.0 BLOATING: Primary | ICD-10-CM

## 2021-07-16 PROCEDURE — 4040F PNEUMOC VAC/ADMIN/RCVD: CPT | Performed by: INTERNAL MEDICINE

## 2021-07-16 PROCEDURE — 1036F TOBACCO NON-USER: CPT | Performed by: INTERNAL MEDICINE

## 2021-07-16 PROCEDURE — 99213 OFFICE O/P EST LOW 20 MIN: CPT | Performed by: INTERNAL MEDICINE

## 2021-07-16 PROCEDURE — G8417 CALC BMI ABV UP PARAM F/U: HCPCS | Performed by: INTERNAL MEDICINE

## 2021-07-16 PROCEDURE — 3017F COLORECTAL CA SCREEN DOC REV: CPT | Performed by: INTERNAL MEDICINE

## 2021-07-16 PROCEDURE — G8427 DOCREV CUR MEDS BY ELIG CLIN: HCPCS | Performed by: INTERNAL MEDICINE

## 2021-07-16 PROCEDURE — 1123F ACP DISCUSS/DSCN MKR DOCD: CPT | Performed by: INTERNAL MEDICINE

## 2021-07-16 RX ORDER — GREEN TEA/HOODIA GORDONII 315-12.5MG
1 CAPSULE ORAL DAILY
Qty: 90 TABLET | Refills: 3 | Status: SHIPPED | OUTPATIENT
Start: 2021-07-16

## 2021-07-16 NOTE — PROGRESS NOTES
(FLOMAX) 0.4 MG capsule, take 1 capsule by mouth once daily, Disp: 120 capsule, Rfl: 1    omeprazole (PRILOSEC) 20 MG delayed release capsule, take 1 capsule by mouth every morning before breakfast, Disp: 90 capsule, Rfl: 1    azithromycin (ZITHROMAX) 250 MG tablet, take 2 tablets by mouth on day 1 IN ONE DOSE then 1 tablet on days 2 through 5, Disp: 6 tablet, Rfl: 0    cyclobenzaprine (FLEXERIL) 10 MG tablet, take 1 tablet by mouth every 8 hours if needed, Disp: 60 tablet, Rfl: 0    lisinopril (PRINIVIL;ZESTRIL) 5 MG tablet, take 1 tablet by mouth twice a day, Disp: 180 tablet, Rfl: 0    ipratropium-albuterol (DUONEB) 0.5-2.5 (3) MG/3ML SOLN nebulizer solution, Inhale 3 mLs into the lungs every 4 hours, Disp: 100 vial, Rfl: 2    Evolocumab (REPATHA SC), Inject into the skin, Disp: , Rfl:     metFORMIN (GLUCOPHAGE) 500 MG tablet, take 1 tablet by mouth WITH BREAKFAST then 2 tablets WITH EVENING MEAL, Disp: 270 tablet, Rfl: 1    albuterol sulfate  (90 Base) MCG/ACT inhaler, Inhale 2 puffs into the lungs every 6 hours as needed for Shortness of Breath, Disp: 1 Inhaler, Rfl: 3    albuterol (PROVENTIL) (2.5 MG/3ML) 0.083% nebulizer solution, Take 3 mLs by nebulization every 6 hours as needed for Wheezing or Shortness of Breath, Disp: 120 each, Rfl: 3    apixaban (ELIQUIS) 5 MG TABS tablet, Take 1 tablet by mouth 2 times daily, Disp: 60 tablet, Rfl: 3    nitroGLYCERIN (NITROSTAT) 0.4 MG SL tablet, Place 1 tablet under the tongue every 5 minutes as needed for Chest pain, Disp: 10 tablet, Rfl: 5    Blood Glucose Monitoring Suppl (TRUE METRIX AIR GLUCOSE METER) w/Device KIT, 1 kit by Does not apply route 2 times daily, Disp: 1 kit, Rfl: 0    blood glucose test strips (ASCENSIA AUTODISC VI;ONE TOUCH ULTRA TEST VI) strip, 1 each by Other route 2 times daily As needed. , Disp: 100 each, Rfl: 2    ranolazine (RANEXA) 500 MG extended release tablet, Take 500 mg by mouth 2 times daily, Disp: , Rfl:     Blood Pressure Monitoring (BLOOD PRESSURE CUFF) MISC, 1 each by Does not apply route daily as needed (monitoring), Disp: 1 each, Rfl: 0    Glucose Blood (BLOOD GLUCOSE TEST STRIPS) STRP, Testing qd -bid prn, Disp: 100 strip, Rfl: 11    ALLERGIES:   Allergies   Allergen Reactions    Symbicort [Budesonide-Formoterol Fumarate] Swelling     Patient reports throat swelling.  Barium-Containing Compounds      \"had a reaction to a test, I think it was barium\"    Dye  [Iodides]      Other reaction(s): Wheezing  Red in color, hot, wheezing    Pravastatin      Statins cause severe muscle aches.  Statins Depletion Therapy        SOCIAL HISTORY:   Social History     Socioeconomic History    Marital status: Single     Spouse name: Not on file    Number of children: Not on file    Years of education: Not on file    Highest education level: Not on file   Occupational History    Not on file   Tobacco Use    Smoking status: Former Smoker     Packs/day: 0.25     Years: 40.00     Pack years: 10.00     Types: Cigarettes     Quit date: 2020     Years since quittin.6    Smokeless tobacco: Never Used    Tobacco comment: trying to quit   Vaping Use    Vaping Use: Never used   Substance and Sexual Activity    Alcohol use: Yes     Comment: not daily    Drug use: Yes     Types: Marijuana     Comment: Smoked lAST 10/29     Sexual activity: Not Currently   Other Topics Concern    Not on file   Social History Narrative    Not on file     Social Determinants of Health     Financial Resource Strain: Low Risk     Difficulty of Paying Living Expenses: Not hard at all   Food Insecurity: No Food Insecurity    Worried About Running Out of Food in the Last Year: Never true    Artur of Food in the Last Year: Never true   Transportation Needs:     Lack of Transportation (Medical):      Lack of Transportation (Non-Medical):    Physical Activity:     Days of Exercise per Week:     Minutes of Exercise per Session: Stress:     Feeling of Stress :    Social Connections:     Frequency of Communication with Friends and Family:     Frequency of Social Gatherings with Friends and Family:     Attends Gnosticist Services:     Active Member of Clubs or Organizations:     Attends Club or Organization Meetings:     Marital Status:    Intimate Partner Violence:     Fear of Current or Ex-Partner:     Emotionally Abused:     Physically Abused:     Sexually Abused:        REVIEW OF SYSTEMS: A 12-point review of systems was obtained and pertinent positives and negatives were enumerated above in the history of present illness. All other reviewed systems / symptoms were negative. Review of Systems     PHYSICAL EXAMINATION: Vital signs reviewed per the nursing documentation. Wt 237 lb (107.5 kg)   BMI 33.05 kg/m²   Body mass index is 33.05 kg/m². I personally reviewed the nurse's notes and documentation and I agree with her notes. General: alert, appears stated age and cooperative Psych: Normal. and Alert and oriented, appropriate affect. . Normal affect. Mentation normal  HEENT: PERRLA. Clear conjunctivae and sclerae. Moist oral mucosae, no lesions or ulcers. The neck is supple, without lymphadenopathy or jugular venous distension. No masses. Normal thyroid. Cardiovascular: S1 S2 RRR no rubs or murmurs. Pulmonary: clear BL. No accessory muscle usage. Abdominal Exam: Soft, NT ND, no hepato or spleno megaly, +BS, no ascites.       LABORATORY DATA: Reviewed  Lab Results   Component Value Date    WBC 7.0 01/15/2021    HGB 15.6 01/15/2021    HCT 48.0 01/15/2021    MCV 88.7 01/15/2021     01/15/2021     01/15/2021    K 3.4 (L) 01/15/2021     01/15/2021    CO2 28 01/15/2021    BUN 9 01/15/2021    CREATININE 0.67 (L) 01/15/2021    LABPROT 7.1 06/18/2012    LABALBU 3.9 01/15/2021    BILITOT 0.49 01/15/2021    ALKPHOS 55 01/15/2021    AST 17 01/15/2021    ALT 15 01/15/2021    INR 1.1 05/17/2018 Lab Results   Component Value Date    RBC 5.41 01/15/2021    HGB 15.6 01/15/2021    MCV 88.7 01/15/2021    MCH 28.8 01/15/2021    MCHC 32.5 01/15/2021    RDW 15.1 (H) 01/15/2021    MPV 9.7 01/15/2021    BASOPCT 0 01/15/2021    LYMPHSABS 2.07 01/15/2021    MONOSABS 0.57 01/15/2021    NEUTROABS 4.11 01/15/2021    EOSABS 0.13 01/15/2021    BASOSABS 0.03 01/15/2021         DIAGNOSTIC TESTING:   No results found. IMPRESSION: Mr. Rajani Anderson is a 79 y.o. male with bloating. Very likely functional.  Very likely due to his diabetes. Optimize diabetes control. Trial of rifaximin mean. FODMAP diet. Follow-up in 1 to 2 months. [facility]      Please note that this chart was generated using voice recognition Dragon dictation software. Although every effort was made to ensure the accuracy of this automated transcription, some errors in transcription may have occurred.

## 2021-10-22 ENCOUNTER — OFFICE VISIT (OUTPATIENT)
Dept: GASTROENTEROLOGY | Age: 71
End: 2021-10-22
Payer: MEDICARE

## 2021-10-22 VITALS
BODY MASS INDEX: 32.56 KG/M2 | DIASTOLIC BLOOD PRESSURE: 83 MMHG | SYSTOLIC BLOOD PRESSURE: 150 MMHG | HEART RATE: 74 BPM | HEIGHT: 71 IN | OXYGEN SATURATION: 93 % | WEIGHT: 232.6 LBS

## 2021-10-22 DIAGNOSIS — R14.0 ABDOMINAL BLOATING: Primary | ICD-10-CM

## 2021-10-22 PROCEDURE — 1123F ACP DISCUSS/DSCN MKR DOCD: CPT | Performed by: INTERNAL MEDICINE

## 2021-10-22 PROCEDURE — G8417 CALC BMI ABV UP PARAM F/U: HCPCS | Performed by: INTERNAL MEDICINE

## 2021-10-22 PROCEDURE — 3017F COLORECTAL CA SCREEN DOC REV: CPT | Performed by: INTERNAL MEDICINE

## 2021-10-22 PROCEDURE — G8427 DOCREV CUR MEDS BY ELIG CLIN: HCPCS | Performed by: INTERNAL MEDICINE

## 2021-10-22 PROCEDURE — 4040F PNEUMOC VAC/ADMIN/RCVD: CPT | Performed by: INTERNAL MEDICINE

## 2021-10-22 PROCEDURE — 99213 OFFICE O/P EST LOW 20 MIN: CPT | Performed by: INTERNAL MEDICINE

## 2021-10-22 PROCEDURE — 1036F TOBACCO NON-USER: CPT | Performed by: INTERNAL MEDICINE

## 2021-10-22 PROCEDURE — G8484 FLU IMMUNIZE NO ADMIN: HCPCS | Performed by: INTERNAL MEDICINE

## 2021-10-22 ASSESSMENT — ENCOUNTER SYMPTOMS: NAUSEA: 1

## 2021-10-22 NOTE — PROGRESS NOTES
5 MG tablet, take 1 tablet by mouth twice a day, Disp: 180 tablet, Rfl: 0    Probiotic Acidophilus (FLORANEX) TABS, Take 1 tablet by mouth daily, Disp: 90 tablet, Rfl: 3    metFORMIN (GLUCOPHAGE) 500 MG tablet, take 1 tablet by mouth WITH BREAKFAST then 2 tablets WITH EVENING MEAL, Disp: 270 tablet, Rfl: 1    albuterol sulfate  (90 Base) MCG/ACT inhaler, Inhale 2 puffs into the lungs every 6 hours as needed for Shortness of Breath, Disp: 1 Inhaler, Rfl: 3    nitroGLYCERIN (NITROSTAT) 0.4 MG SL tablet, Place 1 tablet under the tongue every 5 minutes as needed for Chest pain, Disp: 10 tablet, Rfl: 5    tamsulosin (FLOMAX) 0.4 MG capsule, take 1 capsule by mouth once daily, Disp: 120 capsule, Rfl: 1    omeprazole (PRILOSEC) 20 MG delayed release capsule, take 1 capsule by mouth every morning before breakfast, Disp: 90 capsule, Rfl: 1    azithromycin (ZITHROMAX) 250 MG tablet, take 2 tablets by mouth on day 1 IN ONE DOSE then 1 tablet on days 2 through 5, Disp: 6 tablet, Rfl: 0    cyclobenzaprine (FLEXERIL) 10 MG tablet, take 1 tablet by mouth every 8 hours if needed, Disp: 60 tablet, Rfl: 0    ipratropium-albuterol (DUONEB) 0.5-2.5 (3) MG/3ML SOLN nebulizer solution, Inhale 3 mLs into the lungs every 4 hours, Disp: 100 vial, Rfl: 2    albuterol (PROVENTIL) (2.5 MG/3ML) 0.083% nebulizer solution, Take 3 mLs by nebulization every 6 hours as needed for Wheezing or Shortness of Breath, Disp: 120 each, Rfl: 3    apixaban (ELIQUIS) 5 MG TABS tablet, Take 1 tablet by mouth 2 times daily, Disp: 60 tablet, Rfl: 3    Blood Glucose Monitoring Suppl (TRUE METRIX AIR GLUCOSE METER) w/Device KIT, 1 kit by Does not apply route 2 times daily, Disp: 1 kit, Rfl: 0    blood glucose test strips (ASCENSIA AUTODISC VI;ONE TOUCH ULTRA TEST VI) strip, 1 each by Other route 2 times daily As needed. , Disp: 100 each, Rfl: 2    ranolazine (RANEXA) 500 MG extended release tablet, Take 500 mg by mouth 2 times daily, Disp: , Rfl:     Blood Pressure Monitoring (BLOOD PRESSURE CUFF) MISC, 1 each by Does not apply route daily as needed (monitoring), Disp: 1 each, Rfl: 0    Glucose Blood (BLOOD GLUCOSE TEST STRIPS) STRP, Testing qd -bid prn, Disp: 100 strip, Rfl: 11    ALLERGIES:   Allergies   Allergen Reactions    Symbicort [Budesonide-Formoterol Fumarate] Swelling     Patient reports throat swelling.  Barium-Containing Compounds      \"had a reaction to a test, I think it was barium\"    Dye  [Iodides]      Other reaction(s): Wheezing  Red in color, hot, wheezing    Pravastatin      Statins cause severe muscle aches.     Statins Depletion Therapy        FAMILY HISTORY:       Problem Relation Age of Onset    Diabetes Other     Heart Disease Other     High Blood Pressure Other     Cancer Other     COPD Other     Arthritis Other          SOCIAL HISTORY:   Social History     Socioeconomic History    Marital status: Single     Spouse name: Not on file    Number of children: Not on file    Years of education: Not on file    Highest education level: Not on file   Occupational History    Not on file   Tobacco Use    Smoking status: Former Smoker     Packs/day: 0.25     Years: 40.00     Pack years: 10.00     Types: Cigarettes     Quit date: 2020     Years since quittin.9    Smokeless tobacco: Never Used    Tobacco comment: trying to quit   Vaping Use    Vaping Use: Never used   Substance and Sexual Activity    Alcohol use: Yes     Comment: not daily    Drug use: Yes     Types: Marijuana     [facility]    Sexual activity: Not Currently   Other Topics Concern    Not on file   Social History Narrative    Not on file     Social Determinants of Health     Financial Resource Strain: Low Risk     Difficulty of Paying Living Expenses: Not hard at all   Food Insecurity: No Food Insecurity    Worried About Running Out of Food in the Last Year: Never true    [address] in the Last Year: Never true   Transportation Needs:     Lack of Transportation (Medical):  Lack of Transportation (Non-Medical):    Physical Activity:     Days of Exercise per Week:     Minutes of Exercise per Session:    Stress:     Feeling of Stress :    Social Connections:     Frequency of Communication with Friends and Family:     Frequency of Social Gatherings with Friends and Family:     Attends Zoroastrian Services:     Active Member of Clubs or Organizations:     Attends Club or Organization Meetings:     Marital Status:    Intimate Partner Violence:     Fear of Current or Ex-Partner:     Emotionally Abused:     Physically Abused:     Sexually Abused:        REVIEW OF SYSTEMS: A 12-point review of systemswas obtained and pertinent positives and negatives were enumerated above in the history of present illness. All other reviewed systems / symptoms were negative. Review of Systems   Gastrointestinal: Positive for nausea. LABORATORY DATA: Reviewed  Lab Results   Component Value Date    WBC 7.0 01/15/2021    HGB 15.6 01/15/2021    HCT 48.0 01/15/2021    MCV 88.7 01/15/2021     01/15/2021     01/15/2021    K 3.4 (L) 01/15/2021     01/15/2021    CO2 28 01/15/2021    BUN 9 01/15/2021    CREATININE 0.67 (L) 01/15/2021    LABPROT 7.1 06/18/2012    LABALBU 3.9 01/15/2021    BILITOT 0.49 01/15/2021    ALKPHOS 55 01/15/2021    AST 17 01/15/2021    ALT 15 01/15/2021    INR 1.1 05/17/2018         Lab Results   Component Value Date    RBC 5.41 01/15/2021    HGB 15.6 01/15/2021    MCV 88.7 01/15/2021    MCH 28.8 01/15/2021    MCHC 32.5 01/15/2021    RDW 15.1 (H) 01/15/2021    MPV 9.7 01/15/2021    BASOPCT 0 01/15/2021    LYMPHSABS 2.07 01/15/2021    MONOSABS 0.57 01/15/2021    NEUTROABS 4.11 01/15/2021    EOSABS 0.13 01/15/2021    BASOSABS 0.03 01/15/2021         DIAGNOSTIC TESTING:     No results found. PHYSICAL EXAMINATION: Vital signs reviewed per the nursing documentation.      There were no vitals taken for this visit. There is no height or weight on file to calculate BMI. Physical Exam      I personally reviewed the nurse's notes and documentation and I agree with her notes. General: alert, appears stated age and cooperative Psych: Normal. and Alert and oriented, appropriate affect. . Normal affect. Mentation normal  HEENT: PERRLA. Clear conjunctivae and sclerae. Moist oral mucosae, no lesions or ulcers. The neck is supple, without lymphadenopathy or jugular venous distension. No masses. Normal thyroid. Cardiovascular: S1 S2 RRR no rubs or murmurs. Pulmonary: clear BL. No accessory muscle usage. Abdominal Exam: Soft, NT ND, no hepato or spleno megaly, +BS, no ascites. IMPRESSION: Mr. Radha Bah is a 79 y.o. male with bloating. Functional.  He is much better with dietary changes and probiotics. Continue probiotics for 1 more month and after that he will take it every other day for couple weeks and eventually stop taking it. Follow-up as needed. Continue diet exercise and weight loss. Thank you for allowing me to participate in the care of Mr. Radha Bah. For any further questions please do not hesitate to contact me. I have reviewed and agree with the ROS entered by the MA/LPN. Note is dictated utilizing voice recognition software. Unfortunately this leads to occasional typographical errors. Please contact our office if you have any questions.     Beverly Valenzuela MD  [facility]  O: #[phone]

## 2021-11-14 ENCOUNTER — HOSPITAL ENCOUNTER (INPATIENT)
Age: 71
LOS: 2 days | Discharge: HOME | DRG: 194 | End: 2021-11-16
Attending: EMERGENCY MEDICINE | Admitting: FAMILY MEDICINE
Payer: MEDICARE

## 2021-11-14 ENCOUNTER — APPOINTMENT (OUTPATIENT)
Dept: GENERAL RADIOLOGY | Age: 71
DRG: 194 | End: 2021-11-14
Payer: MEDICARE

## 2021-11-14 DIAGNOSIS — J18.9 COMMUNITY ACQUIRED PNEUMONIA, BILATERAL: Primary | ICD-10-CM

## 2021-11-14 DIAGNOSIS — R09.02 HYPOXIA: ICD-10-CM

## 2021-11-14 DIAGNOSIS — J18.9 COMMUNITY ACQUIRED PNEUMONIA, UNSPECIFIED LATERALITY: ICD-10-CM

## 2021-11-14 LAB
ABSOLUTE EOS #: 0.2 K/UL (ref 0–0.4)
ABSOLUTE IMMATURE GRANULOCYTE: ABNORMAL K/UL (ref 0–0.3)
ABSOLUTE LYMPH #: 1.8 K/UL (ref 1–4.8)
ABSOLUTE MONO #: 0.8 K/UL (ref 0.1–1.3)
ALBUMIN SERPL-MCNC: 3.7 G/DL (ref 3.5–5.2)
ALBUMIN/GLOBULIN RATIO: ABNORMAL (ref 1–2.5)
ALP BLD-CCNC: 60 U/L (ref 40–129)
ALT SERPL-CCNC: 16 U/L (ref 5–41)
ANION GAP SERPL CALCULATED.3IONS-SCNC: 10 MMOL/L (ref 9–17)
AST SERPL-CCNC: 17 U/L
BASOPHILS # BLD: 1 % (ref 0–2)
BASOPHILS ABSOLUTE: 0 K/UL (ref 0–0.2)
BILIRUB SERPL-MCNC: 0.52 MG/DL (ref 0.3–1.2)
BNP INTERPRETATION: ABNORMAL
BUN BLDV-MCNC: 9 MG/DL (ref 8–23)
BUN/CREAT BLD: ABNORMAL (ref 9–20)
CALCIUM SERPL-MCNC: 8.8 MG/DL (ref 8.6–10.4)
CHLORIDE BLD-SCNC: 103 MMOL/L (ref 98–107)
CO2: 27 MMOL/L (ref 20–31)
CREAT SERPL-MCNC: 0.6 MG/DL (ref 0.7–1.2)
DIFFERENTIAL TYPE: ABNORMAL
EOSINOPHILS RELATIVE PERCENT: 3 % (ref 0–4)
GFR AFRICAN AMERICAN: >60 ML/MIN
GFR NON-AFRICAN AMERICAN: >60 ML/MIN
GFR SERPL CREATININE-BSD FRML MDRD: ABNORMAL ML/MIN/{1.73_M2}
GFR SERPL CREATININE-BSD FRML MDRD: ABNORMAL ML/MIN/{1.73_M2}
GLUCOSE BLD-MCNC: 108 MG/DL (ref 70–99)
GLUCOSE BLD-MCNC: 136 MG/DL (ref 75–110)
GLUCOSE BLD-MCNC: 147 MG/DL (ref 75–110)
HCT VFR BLD CALC: 45.5 % (ref 41–53)
HEMOGLOBIN: 15.7 G/DL (ref 13.5–17.5)
IMMATURE GRANULOCYTES: ABNORMAL %
INFLUENZA A: NOT DETECTED
INFLUENZA B: NOT DETECTED
LYMPHOCYTES # BLD: 24 % (ref 24–44)
MCH RBC QN AUTO: 29.8 PG (ref 26–34)
MCHC RBC AUTO-ENTMCNC: 34.5 G/DL (ref 31–37)
MCV RBC AUTO: 86.3 FL (ref 80–100)
MONOCYTES # BLD: 11 % (ref 1–7)
NRBC AUTOMATED: ABNORMAL PER 100 WBC
PDW BLD-RTO: 15 % (ref 11.5–14.9)
PLATELET # BLD: 178 K/UL (ref 150–450)
PLATELET ESTIMATE: ABNORMAL
PMV BLD AUTO: 7.6 FL (ref 6–12)
POTASSIUM SERPL-SCNC: 3.3 MMOL/L (ref 3.7–5.3)
PRO-BNP: 339 PG/ML
RBC # BLD: 5.27 M/UL (ref 4.5–5.9)
RBC # BLD: ABNORMAL 10*6/UL
SARS-COV-2 RNA, RT PCR: NOT DETECTED
SEG NEUTROPHILS: 61 % (ref 36–66)
SEGMENTED NEUTROPHILS ABSOLUTE COUNT: 4.7 K/UL (ref 1.3–9.1)
SODIUM BLD-SCNC: 140 MMOL/L (ref 135–144)
SOURCE: NORMAL
SPECIMEN DESCRIPTION: NORMAL
TOTAL PROTEIN: 6.8 G/DL (ref 6.4–8.3)
TROPONIN INTERP: NORMAL
TROPONIN INTERP: NORMAL
TROPONIN T: NORMAL NG/ML
TROPONIN T: NORMAL NG/ML
TROPONIN, HIGH SENSITIVITY: 12 NG/L (ref 0–22)
TROPONIN, HIGH SENSITIVITY: 13 NG/L (ref 0–22)
WBC # BLD: 7.6 K/UL (ref 3.5–11)
WBC # BLD: ABNORMAL 10*3/UL

## 2021-11-14 PROCEDURE — 1200000000 HC SEMI PRIVATE

## 2021-11-14 PROCEDURE — 82947 ASSAY GLUCOSE BLOOD QUANT: CPT

## 2021-11-14 PROCEDURE — 71045 X-RAY EXAM CHEST 1 VIEW: CPT

## 2021-11-14 PROCEDURE — 83036 HEMOGLOBIN GLYCOSYLATED A1C: CPT

## 2021-11-14 PROCEDURE — 94761 N-INVAS EAR/PLS OXIMETRY MLT: CPT

## 2021-11-14 PROCEDURE — 2580000003 HC RX 258: Performed by: EMERGENCY MEDICINE

## 2021-11-14 PROCEDURE — 6360000002 HC RX W HCPCS: Performed by: EMERGENCY MEDICINE

## 2021-11-14 PROCEDURE — 2700000000 HC OXYGEN THERAPY PER DAY

## 2021-11-14 PROCEDURE — 94640 AIRWAY INHALATION TREATMENT: CPT

## 2021-11-14 PROCEDURE — 83880 ASSAY OF NATRIURETIC PEPTIDE: CPT

## 2021-11-14 PROCEDURE — 6370000000 HC RX 637 (ALT 250 FOR IP): Performed by: EMERGENCY MEDICINE

## 2021-11-14 PROCEDURE — 87070 CULTURE OTHR SPECIMN AEROBIC: CPT

## 2021-11-14 PROCEDURE — 85025 COMPLETE CBC W/AUTO DIFF WBC: CPT

## 2021-11-14 PROCEDURE — 84484 ASSAY OF TROPONIN QUANT: CPT

## 2021-11-14 PROCEDURE — 99285 EMERGENCY DEPT VISIT HI MDM: CPT

## 2021-11-14 PROCEDURE — 87636 SARSCOV2 & INF A&B AMP PRB: CPT

## 2021-11-14 PROCEDURE — 36415 COLL VENOUS BLD VENIPUNCTURE: CPT

## 2021-11-14 PROCEDURE — 6370000000 HC RX 637 (ALT 250 FOR IP): Performed by: FAMILY MEDICINE

## 2021-11-14 PROCEDURE — 87205 SMEAR GRAM STAIN: CPT

## 2021-11-14 PROCEDURE — 93005 ELECTROCARDIOGRAM TRACING: CPT | Performed by: EMERGENCY MEDICINE

## 2021-11-14 PROCEDURE — 80053 COMPREHEN METABOLIC PANEL: CPT

## 2021-11-14 RX ORDER — ONDANSETRON 4 MG/1
4 TABLET, ORALLY DISINTEGRATING ORAL EVERY 8 HOURS PRN
Status: DISCONTINUED | OUTPATIENT
Start: 2021-11-14 | End: 2021-11-16 | Stop reason: HOSPADM

## 2021-11-14 RX ORDER — LACTOBACILLUS RHAMNOSUS GG 10B CELL
1 CAPSULE ORAL
Status: DISCONTINUED | OUTPATIENT
Start: 2021-11-15 | End: 2021-11-16 | Stop reason: HOSPADM

## 2021-11-14 RX ORDER — PANTOPRAZOLE SODIUM 40 MG/1
40 TABLET, DELAYED RELEASE ORAL
Status: DISCONTINUED | OUTPATIENT
Start: 2021-11-15 | End: 2021-11-16 | Stop reason: HOSPADM

## 2021-11-14 RX ORDER — ACETAMINOPHEN 325 MG/1
650 TABLET ORAL EVERY 6 HOURS PRN
Status: DISCONTINUED | OUTPATIENT
Start: 2021-11-14 | End: 2021-11-16 | Stop reason: HOSPADM

## 2021-11-14 RX ORDER — ACETAMINOPHEN 650 MG/1
650 SUPPOSITORY RECTAL EVERY 6 HOURS PRN
Status: DISCONTINUED | OUTPATIENT
Start: 2021-11-14 | End: 2021-11-16 | Stop reason: HOSPADM

## 2021-11-14 RX ORDER — ONDANSETRON 2 MG/ML
4 INJECTION INTRAMUSCULAR; INTRAVENOUS EVERY 6 HOURS PRN
Status: DISCONTINUED | OUTPATIENT
Start: 2021-11-14 | End: 2021-11-14 | Stop reason: SDUPTHER

## 2021-11-14 RX ORDER — IPRATROPIUM BROMIDE AND ALBUTEROL SULFATE 2.5; .5 MG/3ML; MG/3ML
1 SOLUTION RESPIRATORY (INHALATION) EVERY 4 HOURS
Status: DISCONTINUED | OUTPATIENT
Start: 2021-11-14 | End: 2021-11-16 | Stop reason: HOSPADM

## 2021-11-14 RX ORDER — RANOLAZINE 500 MG/1
500 TABLET, EXTENDED RELEASE ORAL 2 TIMES DAILY
Status: DISCONTINUED | OUTPATIENT
Start: 2021-11-14 | End: 2021-11-16 | Stop reason: HOSPADM

## 2021-11-14 RX ORDER — CYCLOBENZAPRINE HCL 10 MG
10 TABLET ORAL 3 TIMES DAILY PRN
Status: DISCONTINUED | OUTPATIENT
Start: 2021-11-14 | End: 2021-11-16 | Stop reason: HOSPADM

## 2021-11-14 RX ORDER — DEXTROSE MONOHYDRATE 25 G/50ML
12.5 INJECTION, SOLUTION INTRAVENOUS PRN
Status: DISCONTINUED | OUTPATIENT
Start: 2021-11-14 | End: 2021-11-16 | Stop reason: HOSPADM

## 2021-11-14 RX ORDER — TAMSULOSIN HYDROCHLORIDE 0.4 MG/1
0.4 CAPSULE ORAL DAILY
Status: DISCONTINUED | OUTPATIENT
Start: 2021-11-14 | End: 2021-11-16 | Stop reason: HOSPADM

## 2021-11-14 RX ORDER — DEXTROSE MONOHYDRATE 50 MG/ML
100 INJECTION, SOLUTION INTRAVENOUS PRN
Status: DISCONTINUED | OUTPATIENT
Start: 2021-11-14 | End: 2021-11-16 | Stop reason: HOSPADM

## 2021-11-14 RX ORDER — TAMSULOSIN HYDROCHLORIDE 0.4 MG/1
0.4 CAPSULE ORAL DAILY
Status: DISCONTINUED | OUTPATIENT
Start: 2021-11-14 | End: 2021-11-14

## 2021-11-14 RX ORDER — ONDANSETRON 4 MG/1
4 TABLET, ORALLY DISINTEGRATING ORAL EVERY 8 HOURS PRN
Status: DISCONTINUED | OUTPATIENT
Start: 2021-11-14 | End: 2021-11-14 | Stop reason: SDUPTHER

## 2021-11-14 RX ORDER — AZITHROMYCIN 250 MG/1
500 TABLET, FILM COATED ORAL EVERY 24 HOURS
Status: DISCONTINUED | OUTPATIENT
Start: 2021-11-15 | End: 2021-11-16 | Stop reason: HOSPADM

## 2021-11-14 RX ORDER — ONDANSETRON 2 MG/ML
4 INJECTION INTRAMUSCULAR; INTRAVENOUS EVERY 6 HOURS PRN
Status: DISCONTINUED | OUTPATIENT
Start: 2021-11-14 | End: 2021-11-16 | Stop reason: HOSPADM

## 2021-11-14 RX ORDER — POLYETHYLENE GLYCOL 3350 17 G/17G
17 POWDER, FOR SOLUTION ORAL DAILY PRN
Status: DISCONTINUED | OUTPATIENT
Start: 2021-11-14 | End: 2021-11-16 | Stop reason: HOSPADM

## 2021-11-14 RX ORDER — POTASSIUM CHLORIDE 7.45 MG/ML
10 INJECTION INTRAVENOUS PRN
Status: DISCONTINUED | OUTPATIENT
Start: 2021-11-14 | End: 2021-11-16 | Stop reason: HOSPADM

## 2021-11-14 RX ORDER — IPRATROPIUM BROMIDE AND ALBUTEROL SULFATE 2.5; .5 MG/3ML; MG/3ML
1 SOLUTION RESPIRATORY (INHALATION)
Status: DISCONTINUED | OUTPATIENT
Start: 2021-11-14 | End: 2021-11-16 | Stop reason: HOSPADM

## 2021-11-14 RX ORDER — NICOTINE POLACRILEX 4 MG
15 LOZENGE BUCCAL PRN
Status: DISCONTINUED | OUTPATIENT
Start: 2021-11-14 | End: 2021-11-16 | Stop reason: HOSPADM

## 2021-11-14 RX ORDER — NITROGLYCERIN 0.4 MG/1
0.4 TABLET SUBLINGUAL EVERY 5 MIN PRN
Status: DISCONTINUED | OUTPATIENT
Start: 2021-11-14 | End: 2021-11-16 | Stop reason: HOSPADM

## 2021-11-14 RX ORDER — LISINOPRIL 5 MG/1
5 TABLET ORAL 2 TIMES DAILY
Status: DISCONTINUED | OUTPATIENT
Start: 2021-11-14 | End: 2021-11-16 | Stop reason: HOSPADM

## 2021-11-14 RX ORDER — GREEN TEA/HOODIA GORDONII 315-12.5MG
1 CAPSULE ORAL DAILY
Status: DISCONTINUED | OUTPATIENT
Start: 2021-11-14 | End: 2021-11-14 | Stop reason: CLARIF

## 2021-11-14 RX ORDER — POTASSIUM CHLORIDE 20 MEQ/1
40 TABLET, EXTENDED RELEASE ORAL PRN
Status: DISCONTINUED | OUTPATIENT
Start: 2021-11-14 | End: 2021-11-16 | Stop reason: HOSPADM

## 2021-11-14 RX ADMIN — IPRATROPIUM BROMIDE AND ALBUTEROL SULFATE 1 AMPULE: .5; 3 SOLUTION RESPIRATORY (INHALATION) at 11:09

## 2021-11-14 RX ADMIN — TAMSULOSIN HYDROCHLORIDE 0.4 MG: 0.4 CAPSULE ORAL at 20:50

## 2021-11-14 RX ADMIN — POLYETHYLENE GLYCOL 3350 17 G: 17 POWDER, FOR SOLUTION ORAL at 20:50

## 2021-11-14 RX ADMIN — RANOLAZINE 500 MG: 500 TABLET, FILM COATED, EXTENDED RELEASE ORAL at 12:58

## 2021-11-14 RX ADMIN — IPRATROPIUM BROMIDE AND ALBUTEROL SULFATE 3 ML: .5; 3 SOLUTION RESPIRATORY (INHALATION) at 15:43

## 2021-11-14 RX ADMIN — CEFTRIAXONE SODIUM 1000 MG: 1 INJECTION, POWDER, FOR SOLUTION INTRAMUSCULAR; INTRAVENOUS at 10:44

## 2021-11-14 RX ADMIN — AZITHROMYCIN 500 MG: 500 INJECTION, POWDER, LYOPHILIZED, FOR SOLUTION INTRAVENOUS at 11:02

## 2021-11-14 RX ADMIN — LISINOPRIL 5 MG: 5 TABLET ORAL at 20:50

## 2021-11-14 RX ADMIN — APIXABAN 5 MG: 5 TABLET, FILM COATED ORAL at 20:50

## 2021-11-14 RX ADMIN — RANOLAZINE 500 MG: 500 TABLET, FILM COATED, EXTENDED RELEASE ORAL at 20:50

## 2021-11-14 RX ADMIN — CYCLOBENZAPRINE 10 MG: 10 TABLET, FILM COATED ORAL at 20:49

## 2021-11-14 RX ADMIN — IPRATROPIUM BROMIDE AND ALBUTEROL SULFATE 3 ML: .5; 3 SOLUTION RESPIRATORY (INHALATION) at 20:38

## 2021-11-14 RX ADMIN — IPRATROPIUM BROMIDE AND ALBUTEROL SULFATE 1 AMPULE: .5; 3 SOLUTION RESPIRATORY (INHALATION) at 11:21

## 2021-11-14 ASSESSMENT — ENCOUNTER SYMPTOMS
SHORTNESS OF BREATH: 1
SPUTUM PRODUCTION: 1
COUGH: 1

## 2021-11-14 NOTE — CARE COORDINATION
CASE MANAGEMENT NOTE:    Admission Date:  11/14/2021 Mike Velasquez is a 79 y.o.  male    Admitted for : Hypoxia [R09.02]  Community acquired pneumonia, unspecified laterality [J18.9]  Community acquired pneumonia, bilateral [J18.9]    Met with:  Patient    PCP:  Otto Hill NP                           Insurance:  [facility] Medicare      Is patient alert and oriented at time of discussion:  Yes    Current Residence/ Living Arrangements:  independently at home             Current Services PTA:  No    Does patient go to outpatient dialysis: No  If yes, location and chair time: NA    Is patient agreeable to VNS: No    Freedom of choice provided:  No    List of [address] provided: No    VNS chosen:  NA    DME:  none    Home Oxygen: No    Nebulizer: Yes    CPAP/BIPAP: No    Supplier: N/A    Potential Assistance Needed: No    SNF needed: No    Freedom of choice and list provided: NA    Pharmacy:  [facility]    Does Patient want to use MEDS to BEDS? No    Is patient currently receiving oral anticoagulation therapy? Yes- Eliquis PTA    Is the Patient an [facility] with Readmission Risk Score greater than 14%? No  If yes, pt needs a follow up appointment made within 7 days. Family Members/Caregivers that pt would like involved in their care:    Yes    If yes, list name here:  Rikcy Xie    Transportation Provider:  Patient and Family             Discharge Plan:  Home with S.O. Pt is from home independently  with S.O . Pt denies need. Denies VNS. DME: Nebulizer  Eliquis PTA for atrial flutter, has pacemaker sees Genesis Navarrete. . New PCP patient appt scheduled with Waleska Castillo Dec. 3.   IV Rocephin. PO zithromax pt is on RA 91%. No home O2.   CXR 11/14: Right basilar and left mid lower lung infiltrates suggesting pneumonia  Following for needs//JF  Electronically signed by: Torito Davila RN on 11/14/2021 at 1:48 PM

## 2021-11-14 NOTE — ED NOTES
Pt comes to this ER with c/o yellow productive cough x 1 week and SOB with minimal exertion. Pt arrives A+O x 4, GCS = 15, PMS x 4 intact, eupneic, and PWD. Pt has diminished lung sounds t/o bilat with faint expiratory wheezes. Pt speaks in complete sentences without difficulty, but he desats into the mid 80's quickly. Pt has no visible peripheral edema.      Reece Mccormick, SADE  11/14/21 [id number]

## 2021-11-14 NOTE — ED PROVIDER NOTES
EMERGENCY DEPARTMENT ENCOUNTER    Pt Name: Narayan Guzman  MRN: [id number]  [place] 1950  Date of evaluation: 11/14/21  CHIEF COMPLAINT       Chief Complaint   Patient presents with    Shortness of Breath     HISTORY OF PRESENT ILLNESS     Shortness of Breath  Severity:  Moderate  Onset quality:  Gradual  Duration:  1 week  Timing:  Constant  Progression:  Worsening  Chronicity:  New  Context: activity    Relieved by:  Rest  Worsened by:  Exertion  Ineffective treatments:  None tried  Associated symptoms: cough and sputum production    Associated symptoms comment:  Yellow phlegm    covid vaccinated with booster 1 week ago      REVIEW OF SYSTEMS     Review of Systems   Respiratory: Positive for cough, sputum production and shortness of breath. All other systems reviewed and are negative.     PASTMEDICAL HISTORY     Past Medical History:   Diagnosis Date    Anxiety disorder 8/31/2020    Atrial flutter ([address] 05/17/2018    CAD (coronary artery disease)     Callus of foot     rt     [facility]ch[facility] obstructive [facility]     GERD (gastroesophageal reflux disease)     H/O cardiovascular stress test 09/2017    Hyperlipidemia     Hypertension     MI (myocardial infarction) ([address]     Obesity     Osteoarthritis     Poor compliance     Rotator cuff tear     bilat    Shoulder pain, bilateral     Type II or unspecified type diabetes mellitus without mention of complication, not stated as uncontrolled      Past Problem List  Patient Active Problem List   Diagnosis Code    Hypertension I10    Hyperlipidemia E78.5    CAD (coronary artery disease) I25.10    COPD (chronic obstructive pulmonary disease) ([address] J44.9    Osteoarthritis M19.90    Obesity E66.9    Rotator cuff tear M75.100    Shoulder pain, bilateral M25.511, M25.512    Callus of foot L84    DM type 2 (diabetes mellitus, type 2) ([address] E11.9    Shoulder arthritis M19.019    RUQ pain R10.11    Atrial flutter ([address] I48.92    IPRATROPIUM-ALBUTEROL (DUONEB) 0.5-2.5 (3) MG/3ML SOLN NEBULIZER SOLUTION    Inhale 3 mLs into the lungs every 4 hours    LISINOPRIL (PRINIVIL;ZESTRIL) 5 MG TABLET    take 1 tablet by mouth twice a day    METFORMIN (GLUCOPHAGE) 500 MG TABLET    take 1 tablet by mouth WITH BREAKFAST then 2 tablets WITH EVENING MEAL    NITROGLYCERIN (NITROSTAT) 0.4 MG SL TABLET    Place 1 tablet under the tongue every 5 minutes as needed for Chest pain    OMEPRAZOLE (PRILOSEC) 20 MG DELAYED RELEASE CAPSULE    take 1 capsule by mouth every morning before breakfast    PROBIOTIC ACIDOPHILUS (FLORANEX) TABS    Take 1 tablet by mouth daily    RANOLAZINE (RANEXA) 500 MG EXTENDED RELEASE TABLET    Take 500 mg by mouth 2 times daily    TAMSULOSIN (FLOMAX) 0.4 MG CAPSULE    take 1 capsule by mouth once daily     ALLERGIES     is allergic to symbicort [budesonide-formoterol fumarate], barium-containing compounds, dye  [iodides], pravastatin, and statins depletion therapy. FAMILY HISTORY     He indicated that the status of his other is unknown. SOCIAL HISTORY       Social History     Tobacco Use    Smoking status: Former Smoker     Packs/day: 0.50     Years: 54.00     Pack years: 27.00     Types: Cigarettes    Smokeless tobacco: Never Used    Tobacco comment: pt previously smoked 3 ppd, started at 11 y/o   Vaping Use    Vaping Use: Never used   Substance Use Topics    Alcohol use: Yes     Comment: not daily    Drug use: Yes     Types: Marijuana (Weed)     Comment: Smoked lAST 10/29 AT 1900     PHYSICAL EXAM     INITIAL VITALS: BP (!) 137/98   Pulse 67   Temp 97.9 °F (36.6 °C) (Oral)   Resp 18   Ht 5' 11\" (1.803 m)   Wt 230 lb (104.3 kg)   SpO2 95%   BMI 32.08 kg/m²    Physical Exam  Constitutional:       General: He is not in acute distress. Appearance: Normal appearance. He is well-developed. He is not diaphoretic. HENT:      Head: Normocephalic and atraumatic.       Right Ear: External ear normal.      Left Ear: External ear normal.      Nose: Nose normal. No congestion. Mouth/Throat:      Mouth: Mucous membranes are moist.      Pharynx: Oropharynx is clear. Eyes:      General:         Right eye: No discharge. Left eye: No discharge. Conjunctiva/sclera: Conjunctivae normal.   Neck:      Trachea: No tracheal deviation. Cardiovascular:      Rate and Rhythm: Normal rate and regular rhythm. Pulses: Normal pulses. Heart sounds: Normal heart sounds. Pulmonary:      Effort: Pulmonary effort is normal. No respiratory distress. Breath sounds: Normal breath sounds. No stridor. No wheezing or rales. Abdominal:      Palpations: Abdomen is soft. Tenderness: There is no abdominal tenderness. There is no guarding or rebound. Musculoskeletal:         General: No tenderness or deformity. Normal range of motion. Cervical back: Normal range of motion and neck supple. Skin:     General: Skin is warm and dry. Capillary Refill: Capillary refill takes less than 2 seconds. Findings: No erythema or rash. Neurological:      General: No focal deficit present. Mental Status: He is alert and oriented to person, place, and time. Cranial Nerves: No cranial nerve deficit. Coordination: Coordination normal.   Psychiatric:         Mood and Affect: Mood normal.         Behavior: Behavior normal.         Thought Content:  Thought content normal.         Judgment: Judgment normal.         MEDICAL DECISION MAKING:   Iv abx for suspected bacterial pneumonia  covid pcr neg  He has been triple covid vaccinated, booster 1 week ago  Do not suspect AMI    ED Course as of 11/14/21 1036   Sun Nov 14, 2021   1026 DW Dr Eze Boyle, admitting patient for Dr Maurice Jaeger is pcp  Do not suspect sepsis or pe  Do not suspect covid [WM]      ED Course User Index  [WM] Clarke Berger MD       Procedures    DIAGNOSTIC RESULTS   EKG:All EKG's are interpreted by the Emergency Department Physician who either signs or Co-signs this chart in the absence of a cardiologist.  Afib, rate 72 bpm, normal intervals, no acute ischemic changes      RADIOLOGY:All plain film, CT, MRI, and formal ultrasound images (except ED bedside ultrasound) are read by the radiologist, see reports below, unless otherwisenoted in MDM or here. XR CHEST PORTABLE   Final Result   Right basilar and left mid lower lung infiltrates suggesting pneumonia. Recommend follow-up to resolution. LABS: All lab results were reviewed by myself, and all abnormals are listed below.   Labs Reviewed   CBC WITH AUTO DIFFERENTIAL - Abnormal; Notable for the following components:       Result Value    RDW 15.0 (*)     Monocytes 11 (*)     All other components within normal limits   COMPREHENSIVE METABOLIC PANEL - Abnormal; Notable for the following components:    Glucose 108 (*)     CREATININE 0.60 (*)     Potassium 3.3 (*)     All other components within normal limits   BRAIN NATRIURETIC PEPTIDE - Abnormal; Notable for the following components:    Pro- (*)     All other components within normal limits   COVID-19 & INFLUENZA COMBO   TROPONIN   TROPONIN       EMERGENCY DEPARTMENTCOURSE:         Vitals:    Vitals:    11/14/21 0817 11/14/21 0820 11/14/21 0931 11/14/21 0938   BP: (!) 185/117  (!) 137/98    Pulse: 82  67    Resp: 24  18    Temp: 97.9 °F (36.6 °C)      TempSrc: Oral      SpO2: (!) 88% 94% 90% 95%   Weight: 230 lb (104.3 kg)      Height: 5' 11\" (1.803 m)          The patient was given the following medications while in the emergency department:  Orders Placed This Encounter   Medications    cefTRIAXone (ROCEPHIN) 1000 mg IVPB in 50 mL D5W minibag     Order Specific Question:   Antimicrobial Indications     Answer:   Pneumonia (HAP)    azithromycin (ZITHROMAX) 500 mg in D5W 250ml addavial     Order Specific Question:   Antimicrobial Indications     Answer:   Pneumonia (CAP)     CONSULTS:  IP CONSULT TO FAMILY MEDICINE    FINAL IMPRESSION      1. Community acquired pneumonia, bilateral    2. Hypoxia          DISPOSITION/PLAN   DISPOSITION Admitted 11/14/2021 10:32:26 AM      PATIENT REFERRED TO:  No follow-up provider specified. DISCHARGE MEDICATIONS:  New Prescriptions    No medications on file     The care is provided during an unprecedented national emergency due to the novel coronavirus, COVID 19.   Carlitos Raymond MD  Attending Emergency Physician                    Carlitos Raymond MD  11/14/21 1037

## 2021-11-15 LAB
ANION GAP SERPL CALCULATED.3IONS-SCNC: 7 MMOL/L (ref 9–17)
BUN BLDV-MCNC: 9 MG/DL (ref 8–23)
BUN/CREAT BLD: ABNORMAL (ref 9–20)
CALCIUM SERPL-MCNC: 8.5 MG/DL (ref 8.6–10.4)
CHLORIDE BLD-SCNC: 100 MMOL/L (ref 98–107)
CO2: 31 MMOL/L (ref 20–31)
CREAT SERPL-MCNC: 0.74 MG/DL (ref 0.7–1.2)
EKG ATRIAL RATE: 78 BPM
EKG P AXIS: 69 DEGREES
EKG Q-T INTERVAL: 454 MS
EKG QRS DURATION: 138 MS
EKG QTC CALCULATION (BAZETT): 517 MS
EKG R AXIS: -71 DEGREES
EKG T AXIS: 104 DEGREES
EKG VENTRICULAR RATE: 78 BPM
ESTIMATED AVERAGE GLUCOSE: 131 MG/DL
GFR AFRICAN AMERICAN: >60 ML/MIN
GFR NON-AFRICAN AMERICAN: >60 ML/MIN
GFR SERPL CREATININE-BSD FRML MDRD: ABNORMAL ML/MIN/{1.73_M2}
GFR SERPL CREATININE-BSD FRML MDRD: ABNORMAL ML/MIN/{1.73_M2}
GLUCOSE BLD-MCNC: 102 MG/DL (ref 75–110)
GLUCOSE BLD-MCNC: 103 MG/DL (ref 75–110)
GLUCOSE BLD-MCNC: 105 MG/DL (ref 75–110)
GLUCOSE BLD-MCNC: 111 MG/DL (ref 75–110)
GLUCOSE BLD-MCNC: 144 MG/DL (ref 70–99)
HBA1C MFR BLD: 6.2 % (ref 4–6)
POTASSIUM SERPL-SCNC: 3.6 MMOL/L (ref 3.7–5.3)
SODIUM BLD-SCNC: 138 MMOL/L (ref 135–144)

## 2021-11-15 PROCEDURE — 6360000002 HC RX W HCPCS: Performed by: FAMILY MEDICINE

## 2021-11-15 PROCEDURE — 36415 COLL VENOUS BLD VENIPUNCTURE: CPT

## 2021-11-15 PROCEDURE — 99223 1ST HOSP IP/OBS HIGH 75: CPT | Performed by: FAMILY MEDICINE

## 2021-11-15 PROCEDURE — 80048 BASIC METABOLIC PNL TOTAL CA: CPT

## 2021-11-15 PROCEDURE — 93010 ELECTROCARDIOGRAM REPORT: CPT | Performed by: INTERNAL MEDICINE

## 2021-11-15 PROCEDURE — 6370000000 HC RX 637 (ALT 250 FOR IP): Performed by: FAMILY MEDICINE

## 2021-11-15 PROCEDURE — 94640 AIRWAY INHALATION TREATMENT: CPT

## 2021-11-15 PROCEDURE — 1200000000 HC SEMI PRIVATE

## 2021-11-15 PROCEDURE — 82947 ASSAY GLUCOSE BLOOD QUANT: CPT

## 2021-11-15 PROCEDURE — 2580000003 HC RX 258: Performed by: FAMILY MEDICINE

## 2021-11-15 PROCEDURE — 2700000000 HC OXYGEN THERAPY PER DAY

## 2021-11-15 PROCEDURE — 94761 N-INVAS EAR/PLS OXIMETRY MLT: CPT

## 2021-11-15 RX ADMIN — PANTOPRAZOLE SODIUM 40 MG: 40 TABLET, DELAYED RELEASE ORAL at 07:42

## 2021-11-15 RX ADMIN — CEFTRIAXONE SODIUM 1000 MG: 1 INJECTION, POWDER, FOR SOLUTION INTRAMUSCULAR; INTRAVENOUS at 09:51

## 2021-11-15 RX ADMIN — RANOLAZINE 500 MG: 500 TABLET, FILM COATED, EXTENDED RELEASE ORAL at 20:23

## 2021-11-15 RX ADMIN — RANOLAZINE 500 MG: 500 TABLET, FILM COATED, EXTENDED RELEASE ORAL at 07:41

## 2021-11-15 RX ADMIN — IPRATROPIUM BROMIDE AND ALBUTEROL SULFATE 3 ML: .5; 3 SOLUTION RESPIRATORY (INHALATION) at 06:41

## 2021-11-15 RX ADMIN — APIXABAN 5 MG: 5 TABLET, FILM COATED ORAL at 20:23

## 2021-11-15 RX ADMIN — AZITHROMYCIN MONOHYDRATE 500 MG: 250 TABLET ORAL at 10:48

## 2021-11-15 RX ADMIN — IPRATROPIUM BROMIDE AND ALBUTEROL SULFATE 3 ML: .5; 3 SOLUTION RESPIRATORY (INHALATION) at 10:36

## 2021-11-15 RX ADMIN — TAMSULOSIN HYDROCHLORIDE 0.4 MG: 0.4 CAPSULE ORAL at 20:23

## 2021-11-15 RX ADMIN — IPRATROPIUM BROMIDE AND ALBUTEROL SULFATE 3 ML: .5; 3 SOLUTION RESPIRATORY (INHALATION) at 14:41

## 2021-11-15 RX ADMIN — METFORMIN HYDROCHLORIDE 500 MG: 500 TABLET ORAL at 07:41

## 2021-11-15 RX ADMIN — Medication 1 CAPSULE: at 07:41

## 2021-11-15 RX ADMIN — METFORMIN HYDROCHLORIDE 500 MG: 500 TABLET ORAL at 16:29

## 2021-11-15 RX ADMIN — LISINOPRIL 5 MG: 5 TABLET ORAL at 20:23

## 2021-11-15 RX ADMIN — APIXABAN 5 MG: 5 TABLET, FILM COATED ORAL at 07:41

## 2021-11-15 RX ADMIN — IPRATROPIUM BROMIDE AND ALBUTEROL SULFATE 3 ML: .5; 3 SOLUTION RESPIRATORY (INHALATION) at 20:12

## 2021-11-15 RX ADMIN — LISINOPRIL 5 MG: 5 TABLET ORAL at 07:42

## 2021-11-15 NOTE — FLOWSHEET NOTE
PT shared his medical history and stated that he drives for his wife and son who do not drive. Welcomed prayer. 11/15/21 1332   Encounter Summary   Services provided to: Patient   Referral/Consult From: [facility]   (11-15-21)   Complexity of Encounter Moderate   Length of Encounter 15 minutes   Spiritual Assessment Completed Yes   Spiritual/Synagogue   Type Spiritual support   Assessment Calm; Approachable;  Hopeful; Coping   Intervention Active listening; Explored feelings, thoughts, concerns; Prayer; Sustaining presence/ Ministry of presence   Outcome Expressed gratitude; Engaged in conversation; Coping

## 2021-11-15 NOTE — PLAN OF CARE
Problem: Infection:  Goal: Will remain free from infection  Description: Will remain free from infection  Outcome: Ongoing  Note: Continue IV antibiotics as ordered. Problem: Safety:  Goal: Free from accidental physical injury  Description: Free from accidental physical injury  Outcome: Ongoing  Note: Patient up and about per self.   Goal: Free from intentional harm  Description: Free from intentional harm  Outcome: Ongoing     Problem: Pain:  Goal: Patient's pain/discomfort is manageable  Description: Patient's pain/discomfort is manageable  Outcome: Ongoing

## 2021-11-15 NOTE — PLAN OF CARE
Problem: Infection:  Goal: Will remain free from infection  Description: Will remain free from infection  11/15/2021 1059 by Jorge Molina RN  Outcome: Ongoing  Note: Patient remains free from new infection at this time. Currently taking antibiotics as prescribed. 11/15/2021 0520 by Ofelia Lehman RN  Outcome: Ongoing  Note: Continue IV antibiotics as ordered. Problem: Safety:  Goal: Free from accidental physical injury  Description: Free from accidental physical injury  11/15/2021 1059 by Jorge Molina RN  Outcome: Ongoing  11/15/2021 0520 by Ofelia Lehman RN  Outcome: Ongoing  Note: Patient up and about per self. Goal: Free from intentional harm  Description: Free from intentional harm  11/15/2021 1059 by Jorge Molina RN  Outcome: Ongoing  11/15/2021 0520 by Ofelia Lehman RN  Outcome: Ongoing     Problem: Pain:  Goal: Patient's pain/discomfort is manageable  Description: Patient's pain/discomfort is manageable  11/15/2021 1059 by Jorge Molina RN  Outcome: Ongoing  Note: Patient denies pain at this time.    11/15/2021 0520 by Ofelia Lehman RN  Outcome: Ongoing     Problem: Skin Integrity:  Goal: Skin integrity will stabilize  Description: Skin integrity will stabilize  11/15/2021 1059 by Jorge Molina RN  Outcome: Ongoing  11/15/2021 0520 by Ofelia Lehman RN  Outcome: Ongoing     Problem: Discharge Planning:  Goal: Patients continuum of care needs are met  Description: Patients continuum of care needs are met  11/15/2021 1059 by Jorge Molina RN  Outcome: Ongoing  11/15/2021 0520 by Ofelia Lehman RN  Outcome: Ongoing

## 2021-11-15 NOTE — CARE COORDINATION
ONGOING DISCHARGE PLAN:    Patient is alert and oriented x4. Spoke with patient regarding discharge plan and patient confirms that plan is still home with no needs. Eliquis PTA for atrial flutter. IV Rocephin and PO zithromax. Pt denies needs and denies VNS. Possible d/c today. Will continue to follow for additional discharge needs.     Electronically signed by Swati Moreno RN on 11/15/2021 at 10:36 AM

## 2021-11-15 NOTE — PROGRESS NOTES
Patient c/o Shortness of Breath while Resting. RN notified Provider order placed for 2 L O2 nasal cannula as needed.

## 2021-11-15 NOTE — H&P
Family Medicine Admit Note    PCP: No primary care provider on file. Date of Admission: 11/14/2021    Date of Service: Pt seen/examined on 11/15/21 and Admitted to Inpatient. Chief Complaint:  Shortness of Breath      History Of Present Illness: The patient is a 79 y.o. male who presents to [facility] with complaints of worsening shortness of breath for the past week. It is worse with exertion and improved with rest. He reports associated cough with yellow sputum production. He has been COVID vaccinated with the booster 1 week ago. His COVID test was negative. He denies any fevers, chills, rhinorrhea, sore throat, chest pain, palpitations, abdominal pain, N/V, diarrhea, blood in stools, dysuria, hematuria, headaches, dizziness or confusion.     Past Medical History:        Diagnosis Date    Anxiety disorder 8/31/2020    Atrial flutter ([facility] 05/17/2018    CAD (coronary artery disease)     Callus of foot     rt     COPD (chronic obstructive pulmonary disease) (HCC)     GERD (gastroesophageal reflux disease)     H/O cardiovascular stress test 09/2017    Hyperlipidemia     Hypertension     MI (myocardial infarction) ([facility]     Obesity     Osteoarthritis     Poor compliance     Rotator cuff tear     bilat    Shoulder pain, bilateral     Type II or unspecified type diabetes mellitus without mention of complication, not stated as uncontrolled        Past Surgical History:        Procedure Laterality Date    CARDIAC CATHETERIZATION  05/17/2018    CARDIAC CATHETERIZATION  2011    COLONOSCOPY      COLONOSCOPY  10/30/2020    COLONOSCOPY N/A 10/30/2020    COLORECTAL CANCER SCREENING, NOT HIGH RISK performed by Etienne Parra MD at [facility]  04/23/2009    Mid RCA (Liberte 3.5 x 32) Proximal RCA (4x20)    PACEMAKER INSERTION  07/12/2019    Dr. Walker Perla         Medications Prior to Admission:    Prior to Admission medications    Medication Sig Start Date End Date Taking?  Authorizing Provider   lisinopril (PRINIVIL;ZESTRIL) 5 MG tablet take 1 tablet by mouth twice a day 11/12/21  Yes CHRISTIANA Salter CNP   Evolocumab (REPATHA) 140 MG/ML SOSY Inject 1 mL into the skin every 14 days 10/14/21  Yes Halie Hernandez MD   Probiotic Acidophilus [facility]) TABS Take 1 tablet by mouth daily 7/16/21  Yes Fidel Ware MD   metFORMIN (GLUCOPHAGE) 500 MG tablet take 1 tablet by mouth WITH BREAKFAST then 2 tablets WITH EVENING MEAL 7/16/21  Yes CHRISTIANA Clark CNP   tamsulosin [facility]) 0.4 MG capsule take 1 capsule by mouth once daily 7/8/21  Yes CHRISTIANA Clark CNP   omeprazole (PRILOSEC) 20 MG delayed release capsule take 1 capsule by mouth every morning before breakfast 6/28/21  Yes CHRISTIANA Clark CNP   cyclobenzaprine (FLEXERIL) 10 MG tablet take 1 tablet by mouth every 8 hours if needed 6/8/21  Yes CHRISTIANA Clark CNP   ipratropium-albuterol (DUONEB) 0.5-2.5 (3) MG/3ML SOLN nebulizer solution Inhale 3 mLs into the lungs every 4 hours 3/3/21  Yes CHRISTIANA Clark CNP   apixaban (ELIQUIS) 5 MG TABS tablet Take 1 tablet by mouth 2 times daily 12/12/19  Yes CHRISTIANA Platt CNP   ranolazine (RANEXA) 500 MG extended release tablet Take 500 mg by mouth 2 times daily   Yes Historical Provider, MD   albuterol sulfate  (90 Base) MCG/ACT inhaler Inhale 2 puffs into the lungs every 6 hours as needed for Shortness of Breath 7/16/21   CHRISTIANA Clark CNP   nitroGLYCERIN (NITROSTAT) 0.4 MG SL tablet Place 1 tablet under the tongue every 5 minutes as needed for Chest pain 7/16/21   CHRISTIANA Clark CNP   lisinopril (PRINIVIL;ZESTRIL) 5 MG tablet take 1 tablet by mouth twice a day 1/19/21   CHRISTIANA Clark CNP   tamsulosin [facility]) 0.4 MG capsule take 1 capsule by mouth once daily 11/4/20   CHRISTIANA Clark CNP   albuterol (PROVENTIL) (2.5 MG/3ML) 0.083% nebulizer solution Take 3 mLs by nebulization every distress appears stated age and cooperative. HEENT Normal cephalic, atraumatic without obvious deformity. Pupils equal, round, and reactive to light. Extra ocular muscles intact. Conjunctivae/corneas clear. Neck: Supple, No jugular venous distention/bruits. Trachea midline without thyromegaly or adenopathy with full range of motion. Lungs: Bilateral bases with Rales/Wheezes/Rhonchi with good respiratory effort. Heart: Regular rate and rhythm with Normal S1/S2 without murmurs, rubs or gallops, point of maximum impulse non-displaced  Abdomen: Soft, non-tender or non-distended without rigidity or guarding and positive bowel sounds all four quadrants. Extremities: No clubbing, cyanosis, or edema bilaterally. Full range of motion without deformity and normal gait intact. Skin: Skin color, texture, turgor normal.  No rashes or lesions. Neurologic: Alert and oriented X 3, neurovascularly intact with sensory/motor intact upper extremities/lower extremities, bilaterally. Cranial nerves: II-XII intact, grossly non-focal.  Mental status: Alert, oriented, thought content appropriate. CXR:  I have reviewed the CXR with the following interpretation: right basilar and left mid lobe infiltrates  EKG:  I have reviewed the EKG with the following interpretation: Ventricular paced rhythm    CBC   Recent Labs     11/14/21  0944   WBC 7.6   HGB 15.7   HCT 45.5         RENAL  Recent Labs     11/14/21  0944      K 3.3*      CO2 27   BUN 9   CREATININE 0.60*     LFT'S  Recent Labs     11/14/21  0944   AST 17   ALT 16   BILITOT 0.52   ALKPHOS 60     COAG  No results for input(s): INR in the last 72 hours. CARDIAC ENZYMES  No results for input(s): CKTOTAL, CKMB, CKMBINDEX, TROPONINI in the last 72 hours.     U/A:    Lab Results   Component Value Date    COLORU YELLOW 06/16/2018    WBCUA 0 TO 2 06/16/2018    RBCUA 20 TO 50 06/16/2018    MUCUS NOT REPORTED 06/16/2018    BACTERIA FEW 06/16/2018    SPECGRAV 1.024 06/16/2018    LEUKOCYTESUR NEGATIVE 06/16/2018    GLUCOSEU NEGATIVE 06/16/2018    GLUCOSEU NEGATIVE 10/12/2011    AMORPHOUS NOT REPORTED 06/16/2018       ABG  No results found for: TKY7TFK, BEART, T2KQPFEB, PHART, THGBART, JOZ3WGV, PO2ART, UPS4ROK        Active Hospital Problems    Diagnosis Date Noted    Community acquired pneumonia [J18.9] 11/14/2021    Anxiety disorder [F41.9] 08/31/2020    Gastroesophageal reflux disease [K21.9] 03/06/2020    Type 2 diabetes mellitus without complication, without long-term current use of insulin ([facility] [E11.9] 11/21/2011    Class 1 obesity in adult [E66.9]     Primary osteoarthritis involving multiple joints [M89.49]     CAD (coronary artery disease) [I25.10]     Other emphysema ([facility] [address] [facility] [J43.8]     Mixed hyperlipidemia [E78.2]     Essential hypertension [I10]          ASSESSMENT/PLAN:  Patient admitted with Community acquired pneumonia. Co-morbidities as above. Orders Placed This Encounter   Procedures    COVID-19 & Influenza Combo    Respiratory Culture    XR CHEST PORTABLE    XR CHEST (2 VW)    CBC Auto Differential    Comprehensive Metabolic Panel    Brain Natriuretic Peptide    Troponin    Hemoglobin A1c    Basic Metabolic Panel w/ Reflex to MG    ADULT DIET;  Regular; 5 carb choices (75 gm/meal)    Telemetry monitoring - continuous duration    Place intermittent pneumatic compression device    Vital signs per unit routine    Notify physician    Up as tolerated    HYPOGLYCEMIA TREATMENT: blood glucose less than 50 mg/dL and patient  ALERT and TOLERATING PO    HYPOGLYCEMIA TREATMENT: blood glucose less than 70 mg/dL and patient ALERT and TOLERATING PO    HYPOGLYCEMIA TREATMENT: blood glucose less than 70 mg/dL and patient NOT ALERT or NPO    Full Code    Inpatient [facility] to [facility]    Respiratory care evaluation only    Nasal Cannula Oxygen    POCT glucose    POCT Glucose    POC Glucose Fingerstick    POC Glucose Fingerstick  POC Glucose Fingerstick    POC Glucose Fingerstick    POC Glucose Fingerstick    POC Glucose Fingerstick    EKG 12 Lead    Insert peripheral IV    PATIENT STATUS (FROM ED OR OR/PROCEDURAL) Inpatient         DVT Prophylaxis:   Diet: ADULT DIET;  Regular; 5 carb choices (75 gm/meal)  Code Status: Full Code      Dispo - admitted to Med/Surg       @[place]@

## 2021-11-16 ENCOUNTER — APPOINTMENT (OUTPATIENT)
Dept: GENERAL RADIOLOGY | Age: 71
DRG: 194 | End: 2021-11-16
Payer: MEDICARE

## 2021-11-16 VITALS
TEMPERATURE: 97.7 F | OXYGEN SATURATION: 95 % | RESPIRATION RATE: 18 BRPM | DIASTOLIC BLOOD PRESSURE: 88 MMHG | BODY MASS INDEX: 31.57 KG/M2 | HEIGHT: 71 IN | SYSTOLIC BLOOD PRESSURE: 139 MMHG | HEART RATE: 87 BPM | WEIGHT: 225.53 LBS

## 2021-11-16 LAB
ANION GAP SERPL CALCULATED.3IONS-SCNC: 9 MMOL/L (ref 9–17)
BUN BLDV-MCNC: 10 MG/DL (ref 8–23)
BUN/CREAT BLD: ABNORMAL (ref 9–20)
CALCIUM SERPL-MCNC: 9.1 MG/DL (ref 8.6–10.4)
CHLORIDE BLD-SCNC: 101 MMOL/L (ref 98–107)
CO2: 30 MMOL/L (ref 20–31)
CREAT SERPL-MCNC: 0.65 MG/DL (ref 0.7–1.2)
CULTURE: ABNORMAL
DIRECT EXAM: ABNORMAL
GFR AFRICAN AMERICAN: >60 ML/MIN
GFR NON-AFRICAN AMERICAN: >60 ML/MIN
GFR SERPL CREATININE-BSD FRML MDRD: ABNORMAL ML/MIN/{1.73_M2}
GFR SERPL CREATININE-BSD FRML MDRD: ABNORMAL ML/MIN/{1.73_M2}
GLUCOSE BLD-MCNC: 115 MG/DL (ref 70–99)
GLUCOSE BLD-MCNC: 83 MG/DL (ref 75–110)
Lab: ABNORMAL
POTASSIUM SERPL-SCNC: 3.7 MMOL/L (ref 3.7–5.3)
SODIUM BLD-SCNC: 140 MMOL/L (ref 135–144)
SPECIMEN DESCRIPTION: ABNORMAL

## 2021-11-16 PROCEDURE — 6370000000 HC RX 637 (ALT 250 FOR IP): Performed by: FAMILY MEDICINE

## 2021-11-16 PROCEDURE — 80048 BASIC METABOLIC PNL TOTAL CA: CPT

## 2021-11-16 PROCEDURE — 71046 X-RAY EXAM CHEST 2 VIEWS: CPT

## 2021-11-16 PROCEDURE — 36415 COLL VENOUS BLD VENIPUNCTURE: CPT

## 2021-11-16 PROCEDURE — 94761 N-INVAS EAR/PLS OXIMETRY MLT: CPT

## 2021-11-16 PROCEDURE — 94640 AIRWAY INHALATION TREATMENT: CPT

## 2021-11-16 PROCEDURE — 2580000003 HC RX 258: Performed by: FAMILY MEDICINE

## 2021-11-16 PROCEDURE — 99239 HOSP IP/OBS DSCHRG MGMT >30: CPT | Performed by: FAMILY MEDICINE

## 2021-11-16 PROCEDURE — 6360000002 HC RX W HCPCS: Performed by: FAMILY MEDICINE

## 2021-11-16 PROCEDURE — 82947 ASSAY GLUCOSE BLOOD QUANT: CPT

## 2021-11-16 RX ORDER — AZITHROMYCIN 250 MG/1
250 TABLET, FILM COATED ORAL DAILY
Qty: 10 TABLET | Refills: 0 | Status: SHIPPED | OUTPATIENT
Start: 2021-11-16 | End: 2021-11-26

## 2021-11-16 RX ORDER — CEPHALEXIN 500 MG/1
500 CAPSULE ORAL 2 TIMES DAILY
Qty: 20 CAPSULE | Refills: 0 | Status: SHIPPED | OUTPATIENT
Start: 2021-11-16 | End: 2021-11-26

## 2021-11-16 RX ADMIN — IPRATROPIUM BROMIDE AND ALBUTEROL SULFATE 3 ML: .5; 3 SOLUTION RESPIRATORY (INHALATION) at 10:53

## 2021-11-16 RX ADMIN — CEFTRIAXONE SODIUM 1000 MG: 1 INJECTION, POWDER, FOR SOLUTION INTRAMUSCULAR; INTRAVENOUS at 11:00

## 2021-11-16 RX ADMIN — Medication 1 CAPSULE: at 07:53

## 2021-11-16 RX ADMIN — AZITHROMYCIN MONOHYDRATE 500 MG: 250 TABLET ORAL at 11:00

## 2021-11-16 RX ADMIN — RANOLAZINE 500 MG: 500 TABLET, FILM COATED, EXTENDED RELEASE ORAL at 07:54

## 2021-11-16 RX ADMIN — IPRATROPIUM BROMIDE AND ALBUTEROL SULFATE 3 ML: .5; 3 SOLUTION RESPIRATORY (INHALATION) at 03:08

## 2021-11-16 RX ADMIN — METFORMIN HYDROCHLORIDE 500 MG: 500 TABLET ORAL at 07:54

## 2021-11-16 RX ADMIN — PANTOPRAZOLE SODIUM 40 MG: 40 TABLET, DELAYED RELEASE ORAL at 06:32

## 2021-11-16 RX ADMIN — IPRATROPIUM BROMIDE AND ALBUTEROL SULFATE 3 ML: .5; 3 SOLUTION RESPIRATORY (INHALATION) at 07:07

## 2021-11-16 RX ADMIN — LISINOPRIL 5 MG: 5 TABLET ORAL at 07:54

## 2021-11-16 RX ADMIN — APIXABAN 5 MG: 5 TABLET, FILM COATED ORAL at 07:54

## 2021-11-16 RX ADMIN — ACETAMINOPHEN 650 MG: 325 TABLET ORAL at 07:54

## 2021-11-16 ASSESSMENT — PAIN SCALES - GENERAL
PAINLEVEL_OUTOF10: 2
PAINLEVEL_OUTOF10: 0
PAINLEVEL_OUTOF10: 3

## 2021-11-16 ASSESSMENT — PAIN DESCRIPTION - LOCATION: LOCATION: HEAD

## 2021-11-16 NOTE — PLAN OF CARE
Problem: Infection:  Goal: Will remain free from infection  Description: Will remain free from infection  Outcome: Ongoing  Note: No new s/s of infection. Will continue to monitor        Problem: Safety:  Goal: Free from accidental physical injury  Description: Free from accidental physical injury  Outcome: Ongoing  Note: Patient remains free of injury. safe environment maintained       Problem: Safety:  Goal: Free from intentional harm  Description: Free from intentional harm  Outcome: Ongoing  Note: Free from intentional harm      Problem: Daily Care:  Goal: Daily care needs are met  Description: Daily care needs are met  Outcome: Ongoing  Note: Daily care needs met      Problem: Pain:  Goal: Patient's pain/discomfort is manageable  Description: Patient's pain/discomfort is manageable  Outcome: Ongoing  Note: Denies pain. Will continue to monitor      Problem: Skin Integrity:  Goal: Skin integrity will stabilize  Description: Skin integrity will stabilize  Outcome: Ongoing  Note: Pt skin integrity remained intact, no new alterations noted. Head to toe completed, see chart assessment.         Problem: Discharge Planning:  Goal: Patients continuum of care needs are met  Description: Patients continuum of care needs are met  Outcome: Ongoing  Note: Ongoing continuum of care

## 2021-11-16 NOTE — DISCHARGE INSTR - DIET
Good nutrition is important when healing from an illness, injury, or surgery. Follow any nutrition recommendations given to you during your hospital stay. If you were given an oral nutrition supplement while in the hospital, continue to take this supplement at home. You can take it with meals, in-between meals, and/or before bedtime. These supplements can be purchased at most local grocery stores, pharmacies, and chain [employer]-stores. If you have any questions about your diet or nutrition, call the hospital and ask for the dietitian. Diabetic diet as tolerated.

## 2021-11-17 ENCOUNTER — NURSE TRIAGE (OUTPATIENT)
Dept: OTHER | Facility: CLINIC | Age: 71
End: 2021-11-17

## 2021-11-17 ENCOUNTER — CARE COORDINATION (OUTPATIENT)
Dept: CASE MANAGEMENT | Age: 71
End: 2021-11-17

## 2021-11-17 DIAGNOSIS — J18.9 COMMUNITY ACQUIRED PNEUMONIA, UNSPECIFIED LATERALITY: Primary | ICD-10-CM

## 2021-11-17 PROCEDURE — 1111F DSCHRG MED/CURRENT MED MERGE: CPT | Performed by: FAMILY MEDICINE

## 2021-11-17 NOTE — CARE COORDINATION
[facility] Trans[facility] Initial Follow Up Call    Call within 2 business days of discharge: Yes    Patient: Lorri Zamarripa Patient : 1950   MRN: <[id number]>  Reason for Admission: Community acquired pneumonia  Discharge Date: 21 RARS: Readmission Risk Score: 8.9 ( )      Last Discharge [facility]       Complaint Diagnosis Description Type Department Provider    21 Shortness of Breath Community acquired pneumonia, bilateral ... ED to Hosp-Admission (Discharged) (ADMITTED) [facility] Farideh Kay MD; Sharon Mejia. Spoke with: 24 HOUR INITIAL CALL. Spoke to [place]. [place] states he has a cough with yellow sputum. Short of breath on exertion. No use of oxygen at home. Uses nebulizer treatments as directed. Appetite and fluid intake is good. Did not check FBS today. Bowel and bladder WNL. Pt. Has no HHC or DME. Needs at this time. Discussed f/u with PCP. Pt. Will call today to make an appt. Pt. Drives self to appts. Will continue to follow. Facility: [address] [place] [address] of Care Initial Call    Was this an external facility discharge? No Discharge Facility: n/a    Challenges to be reviewed by the provider   Additional needs identified to be addressed with provider: No  none             Method of communication with provider : none      Advance Care Planning:   Does patient have an Advance Directive: reviewed and current. Was this a readmission? No  Patient stated reason for admission: shortness of breath  Patients top risk factors for readmission: medical condition-pneumonia  cough dyspnea    Care Transition Nurse (CTN) contacted the patient by telephone to perform post hospital discharge assessment. Verified name and  with patient as identifiers. Provided introduction to self, and explanation of the CTN role. CTN reviewed discharge instructions, medical action plan and red flags with patient who verbalized understanding.  Patient given an opportunity to ask questions and does not have any further questions or concerns at this time. Were discharge instructions available to patient? Yes. Reviewed appropriate site of care based on symptoms and resources available to patient including: PCP and Specialist. The patient agrees to contact the PCP office for questions related to their healthcare. Medication reconciliation was performed with patient, who verbalizes understanding of administration of home medications. Advised obtaining a 90-day supply of all daily and as-needed medications. Covid Risk Education     Educated patient about risk for severe COVID-19 due to risk factors according to CDC guidelines. LPN CC reviewed discharge instructions, medical action plan and red flag symptoms with the patient who verbalized understanding. Discussed COVID vaccination status: Yes. Education provided on COVID-19 vaccination as appropriate. Discussed exposure protocols and quarantine with CDC Guidelines. Patient was given an opportunity to verbalize any questions and concerns and agrees to contact LPN CC or health care provider for questions related to their healthcare. Reviewed and educated patient on any new and changed medications related to discharge diagnosis. Was patient discharged with a pulse oximeter? Yes Discussed and confirmed pulse oximeter discharge instructions and when to notify provider or seek emergency care. LPN CC provided contact information. Plan for follow-up call in 5-7 days based on severity of symptoms and risk factors.   Plan for next call: symptom management-dyspnea  cough and follow up appointment-with PCP  Complete antibiotics until gone        Non-face-to-face services provided:  Obtained and reviewed discharge summary and/or continuity of care documents    Care Transitions 24 Hour Call    Do you have all of your prescriptions and are they filled?: Yes  Patient Home Equipment: Nebulizer  Are you an active caregiver in your home?: No  Care Transitions Interventions         Follow Up  Future Appointments   Date Time Provider Lucio Pascual   1/24/2022 10:15 AM Jeremiah Avila MD [facility] HEART/VAS Rae Obrien, [facility] Care Transitions Nurse   [phone]

## 2021-11-17 NOTE — TELEPHONE ENCOUNTER
Received call from [address] at [facility] with [employer]. Brief description of triage: Sinus congestion/SOB on exertion/Elevated B/P. Pt. Was discharged from hospital yesterday for pneumonia. Triage indicates for patient to be seen in office in 3 days,if no appointments are available go to [facility]. Care advice provided, patient verbalizes understanding; denies any other questions or concerns; instructed to call back for any new or worsening symptoms. Writer provided warm transfer to [facility] for appointment scheduling. Attention Provider: Thank you for allowing me to participate in the care of your patient. The patient was connected to triage in response to information provided to the ECC/PSC. Please do not respond through this encounter as the response is not directed to a shared pool. Reason for Disposition   Systolic BP >= 126 OR Diastolic >= 525   Sinus congestion as part of a cold, present < 10 days    Answer Assessment - Initial Assessment Questions  1. LOCATION: \"Where does it hurt? \"      Denies new pain, \"got worked up\" today when his Hand held nebulizer machine not working properly,felt more short of breath,states B/P was elevated this morning 163/101 and repeat now 161/99 pox 92/what he usually runs     2. ONSET: \"When did the sinus pain start? \"  (e.g., hours, days)        4 days ago, Dx with pneumonia,taking antibiotics    3. SEVERITY: \"How bad is the pain? \"   (Scale 1-10; mild, moderate or severe)    - MILD (1-3): doesn't interfere with normal activities     - MODERATE (4-7): interferes with normal activities (e.g., work or school) or awakens from sleep    - SEVERE (8-10): excruciating pain and patient unable to do any normal activities     Denies       4. RECURRENT SYMPTOM: \"Have you ever had sinus problems before? \" If so, ask: \"When was the last time? \" and \"What happened that time? \"       Unsure    5. NASAL CONGESTION: \"Is the nose blocked? \" If so, ask, \"Can you open it or must you breathe through the mouth? \"      No,but breathing mostly thru mouth    6. NASAL DISCHARGE: \"Do you have discharge from your nose? \" If so ask, \"What color? \"      Clear    7. FEVER: \"Do you have a fever? \" If so, ask: \"What is it, how was it measured, and when did it start? \"       No    8. OTHER SYMPTOMS: \"Do you have any other symptoms? \" (e.g., sore throat, cough, earache, difficulty breathing)      Cough more frequent productive yellow sputum,Mild SOB with exertion,elevated B/P    9. PREGNANCY: \"Is there any chance you are pregnant? \" \"When was your last menstrual period? \"      N/A    Answer Assessment - Initial Assessment Questions  1. BLOOD PRESSURE: \"What is the blood pressure? \" \"Did you take at least two measurements 5 minutes apart?\"      163/101 this morning   and 161/99 at present  2. ONSET: \"When did you take your blood pressure? \"      Today    3. HOW: \"How did you obtain the blood pressure? \" (e.g., visiting nurse, automatic home BP monitor)      Home monitor    4. HISTORY: \"Do you have a history of high blood pressure? \"      Yes    5. MEDICATIONS: Tammy Solorzanote you taking any medications for blood pressure? \" \"Have you missed any doses recently? \"      Yes,taking B/P meds    6. OTHER SYMPTOMS: \"Do you have any symptoms? \" (e.g., headache, chest pain, blurred vision, difficulty breathing, weakness)      Sinus/head congestion,SOB exertion, discharged from hosp yeserday for pneumonia    7. PREGNANCY: \"Is there any chance you are pregnant? \" \"When was your last menstrual period? \"      n/a    Protocols used: HIGH BLOOD PRESSURE-ADULT-OH, SINUS PAIN OR CONGESTION-ADULT-OH

## 2021-11-19 NOTE — DISCHARGE SUMMARY
[facility] Discharge Summary      Patient ID: Chin Quintanilla    MRN: [id number]     Acct:  [de-identified]       Patient's PCP: No primary care provider on file. Admit Date: 11/14/2021     Discharge Date: 11/16/2021      Admitting Physician: Issac Brizuela MD    Discharge Physician: Issac Brizuela MD     Discharge Diagnoses:    Primary Problem  Community acquired pneumonia    Active Hospital Problems    Diagnosis Date Noted    Hypoxia [R09.02]     Community acquired pneumonia [J18.9] 11/14/2021    Anxiety disorder [F41.9] 08/31/2020    Gastroesophageal reflux disease [K21.9] 03/06/2020    Type 2 diabetes mellitus without complication, without long-term current use of insulin ([address] [E11.9] 11/21/2011    Class 1 obesity in adult [E66.9]     Primary osteoarthritis involving multiple joints [M89.49]     CAD (coronary artery disease) [I25.10]     Other emphysema ([address] [J43.8]     Mixed hyperlipidemia [E78.2]     Essential hypertension [I10]      Past Medical History:   Diagnosis Date    Anxiety disorder 8/31/2020    Atrial flutter ([address] 05/17/2018    CAD (coronary artery disease)     Callus of foot     rt     COPD (chronic obstructive pulmonary disease) ([address]     GERD (gastroesophageal reflux disease)     H/O cardiovascular stress test 09/2017    Hyperlipidemia     Hypertension     MI (myocardial infarction) ([address]     Obesity     Osteoarthritis     Poor compliance     Rotator cuff tear     bilat    Shoulder pain, bilateral     Type II or unspecified type diabetes mellitus without mention of complication, not stated as uncontrolled      The patient was seen and examined on day of discharge and this discharge summary is in conjunction with any daily progress note from day of discharge.     Code Status:  Prior    Hospital Course: uncomplicated    Consults:  none    Significant Diagnostic Studies: as above, and as follows: see chart    Treatments: as above    Disposition: home    Discharged Condition: Stable    Follow Up:  No primary care provider on file. in one week with me    Discharge Medications:      Medication List      START taking these medications    azithromycin 250 MG tablet  Commonly known as: ZITHROMAX  Take 1 tablet by mouth daily for 10 days     cephALEXin 500 MG capsule  Commonly known as: KEFLEX  Take 1 capsule by mouth 2 times daily for 10 days        CONTINUE taking these medications    * albuterol (2.5 MG/3ML) 0.083% nebulizer solution  Commonly known as: PROVENTIL  Take 3 mLs by nebulization every 6 hours as needed for Wheezing or Shortness of Breath     * albuterol sulfate  (90 Base) MCG/ACT inhaler  Inhale 2 puffs into the lungs every 6 hours as needed for Shortness of Breath     apixaban 5 MG Tabs tablet  Commonly known as: ELIQUIS  Take 1 tablet by mouth 2 times daily     * blood glucose test strips  Testing qd -bid prn     * blood glucose test strips strip  Commonly known as: [employer] AUTODISC VI;ONE TOUCH ULTRA TEST VI  1 each by Other route 2 times daily As needed.      Blood Pressure Cuff Misc  1 each by Does not apply route daily as needed (monitoring)     cyclobenzaprine 10 MG tablet  Commonly known as: FLEXERIL  take 1 tablet by mouth every 8 hours if needed     ipratropium-albuterol 0.5-2.5 (3) MG/3ML Soln nebulizer solution  Commonly known as: DUONEB  Inhale 3 mLs into the lungs every 4 hours     lisinopril 5 MG tablet  Commonly known as: PRINIVIL;ZESTRIL  take 1 tablet by mouth twice a day     metFORMIN 500 MG tablet  Commonly known as: GLUCOPHAGE  take 1 tablet by mouth WITH BREAKFAST then 2 tablets WITH EVENING MEAL     nitroGLYCERIN 0.4 MG SL tablet  Commonly known as: NITROSTAT  Place 1 tablet under the tongue every 5 minutes as needed for Chest pain     omeprazole 20 MG delayed release capsule  Commonly known as: PRILOSEC  take 1 capsule by mouth every morning before breakfast     Probiotic Acidophilus Tabs  Take 1 tablet by mouth daily     ranolazine 500 MG extended release tablet  Commonly known as: RANEXA     Repatha 140 MG/ML Sosy  Generic drug: Evolocumab  Inject 1 mL into the skin every 14 days     tamsulosin 0.4 MG capsule  Commonly known as: FLOMAX  take 1 capsule by mouth once daily     True Metrix Air Glucose Meter w/Device Kit  1 kit by Does not apply route 2 times daily         * This list has 4 medication(s) that are the same as other medications prescribed for you. Read the directions carefully, and ask your doctor or other care provider to review them with you. Where to Get Your Medications      These medications were sent to [address] [facility], [address] [address] [address]    Phone: [phone]   · azithromycin 250 MG tablet  · cephALEXin 500 MG capsule            Activity: activity as tolerated    Diet: diabetic diet    Time Spent on discharge is more than 35 minutes in the examination, evaluation, counseling and review of medications and discharge plan.       Electronically signed by Michelle Denise MD on 11/19/2021 at 4:52 PM

## 2021-11-22 ENCOUNTER — OFFICE VISIT (OUTPATIENT)
Dept: FAMILY MEDICINE CLINIC | Age: 71
End: 2021-11-22
Payer: MEDICARE

## 2021-11-22 VITALS
RESPIRATION RATE: 20 BRPM | TEMPERATURE: 97.1 F | SYSTOLIC BLOOD PRESSURE: 142 MMHG | OXYGEN SATURATION: 92 % | HEART RATE: 88 BPM | DIASTOLIC BLOOD PRESSURE: 78 MMHG

## 2021-11-22 DIAGNOSIS — J18.9 COMMUNITY ACQUIRED PNEUMONIA, UNSPECIFIED LATERALITY: ICD-10-CM

## 2021-11-22 DIAGNOSIS — I10 HYPERTENSION, UNSPECIFIED TYPE: Primary | ICD-10-CM

## 2021-11-22 DIAGNOSIS — Z09 HOSPITAL DISCHARGE FOLLOW-UP: ICD-10-CM

## 2021-11-22 PROCEDURE — 1036F TOBACCO NON-USER: CPT | Performed by: NURSE PRACTITIONER

## 2021-11-22 PROCEDURE — 4040F PNEUMOC VAC/ADMIN/RCVD: CPT | Performed by: NURSE PRACTITIONER

## 2021-11-22 PROCEDURE — G8427 DOCREV CUR MEDS BY ELIG CLIN: HCPCS | Performed by: NURSE PRACTITIONER

## 2021-11-22 PROCEDURE — 1123F ACP DISCUSS/DSCN MKR DOCD: CPT | Performed by: NURSE PRACTITIONER

## 2021-11-22 PROCEDURE — G8484 FLU IMMUNIZE NO ADMIN: HCPCS | Performed by: NURSE PRACTITIONER

## 2021-11-22 PROCEDURE — 99214 OFFICE O/P EST MOD 30 MIN: CPT | Performed by: NURSE PRACTITIONER

## 2021-11-22 PROCEDURE — 1111F DSCHRG MED/CURRENT MED MERGE: CPT | Performed by: NURSE PRACTITIONER

## 2021-11-22 PROCEDURE — G8417 CALC BMI ABV UP PARAM F/U: HCPCS | Performed by: NURSE PRACTITIONER

## 2021-11-22 PROCEDURE — 3017F COLORECTAL CA SCREEN DOC REV: CPT | Performed by: NURSE PRACTITIONER

## 2021-11-22 ASSESSMENT — ENCOUNTER SYMPTOMS
DIARRHEA: 0
COUGH: 1
EYE PAIN: 0
VOMITING: 0
NAUSEA: 0

## 2021-11-22 NOTE — PROGRESS NOTES
[address] WALK-IN  [facility] [address] [place] [id number]  Dept: [phone]  Dept Fax: [phone]    Mike Velasquez is a 79 y.o. male who presents today for his medical conditions/complaints of   Chief Complaint   Patient presents with    Hypertension     BP check, is currently on antibiotics for pneumonia and not sure if these are elevating BP          HPI:     BP (!) 142/78   Pulse 88   Temp 97.1 °F (36.2 °C) (Temporal)   Resp 20   SpO2 92%       HPI  Pt presented today with concern for elevated blood pressure. Noticed blood pressure increased when discharged from hospital on 11/17/2021. Home reading-  168/101. Diagnosed with community acquired pneumonia on 11/14/2021. Admitted to [facility] 11-14-11/. Discharged on Z-pack and Keflex. I reviewed all notes from hospitalization. He is on  Lisinopril 5 mg po BID. He has no headache, blurry vision, chest pain, syncope, dizziness. Feels he is slowly improving from pneumonia. Was not able to be seen by PCP and was directed to come here for BP check.      Past Medical History:   Diagnosis Date    Anxiety disorder 8/31/2020    Atrial flutter ([address] 05/17/2018    CAD (coronary artery disease)     Callus of foot     rt     COPD (chronic obstructive pulmonary disease) (HCC)     GERD (gastroesophageal reflux disease)     H/O cardiovascular stress test 09/2017    Hyperlipidemia     Hypertension     MI (myocardial infarction) ([address]     Obesity     Osteoarthritis     Poor compliance     Rotator cuff tear     bilat    Shoulder pain, bilateral     Type II or unspecified type diabetes mellitus without mention of complication, not stated as uncontrolled         Past Surgical History:   Procedure Laterality Date    CARDIAC CATHETERIZATION  05/17/2018    CARDIAC CATHETERIZATION  2011    COLONOSCOPY      COLONOSCOPY  10/30/2020    COLONOSCOPY N/A 10/30/2020    COLORECTAL CANCER SCREENING, NOT HIGH RISK performed by Dru Powell MD at [address]  2009    Mid RCA (Liberte 3.5 x 32) Proximal RCA (4x20)    PACEMAKER INSERTION  2019    Dr. Marina Ann         Family History   Problem Relation Age of Onset    Diabetes Other     Heart Disease Other     High Blood Pressure Other     Cancer Other     COPD Other     Arthritis Other        Social History     Tobacco Use    Smoking status: Former Smoker     Packs/day: 0.50     Years: 54.00     Pack years: 27.00     Types: Cigarettes     Quit date:      Years since quittin.8    Smokeless tobacco: Never Used    Tobacco comment: pt previously smoked 3 ppd, started at 11 y/o   Substance Use Topics    Alcohol use: Yes     Comment: not daily        Prior to Visit Medications    Medication Sig Taking?  Authorizing Provider   Jaret Ventura 865 MG/ML SOAJ inject 1 PEN INJECTOR SUBCUTANEOUSLU EVERY 2 WEEKS inject ON THE  AND 15TH OF EVERY MONTH  Historical Provider, MD   azithromycin (ZITHROMAX) 250 MG tablet Take 1 tablet by mouth daily for 10 days  Amelie Dominique MD   cephALEXin (KEFLEX) 500 MG capsule Take 1 capsule by mouth 2 times daily for 10 days  Amelie Dominique MD   lisinopril (PRINIVIL;ZESTRIL) 5 MG tablet take 1 tablet by mouth twice a day  CHRISTIANA Franco CNP   Probiotic Acidophilus (FLORANEX) TABS Take 1 tablet by mouth daily  Denise Miller MD   metFORMIN (GLUCOPHAGE) 500 MG tablet take 1 tablet by mouth WITH BREAKFAST then 2 tablets WITH EVENING MEAL  Jono ParoEDER gonzalezN - CNP   albuterol sulfate  (90 Base) MCG/ACT inhaler Inhale 2 puffs into the lungs every 6 hours as needed for Shortness of Breath  Jono ParoEDER gonzalezN - CNP   nitroGLYCERIN (NITROSTAT) 0.4 MG SL tablet Place 1 tablet under the tongue every 5 minutes as needed for Chest pain  Jono EDER GargN - CNP   tamsulosin (FLOMAX) 0.4 MG capsule take 1 capsule by mouth once daily CHRISTIANA Doyle CNP   omeprazole (PRILOSEC) 20 MG delayed release capsule take 1 capsule by mouth every morning before breakfast  CHRISTIANA Doyle CNP   cyclobenzaprine (FLEXERIL) 10 MG tablet take 1 tablet by mouth every 8 hours if needed  CHRISTIANA Doyle CNP   ipratropium-albuterol (DUONEB) 0.5-2.5 (3) MG/3ML SOLN nebulizer solution Inhale 3 mLs into the lungs every 4 hours  CHRISTIANA Doyle CNP   lisinopril (PRINIVIL;ZESTRIL) 5 MG tablet take 1 tablet by mouth twice a day  CHRISTIANA Doyle CNP   tamsulosin (FLOMAX) 0.4 MG capsule take 1 capsule by mouth once daily  CHRISTIANA Doyle CNP   albuterol (PROVENTIL) (2.5 MG/3ML) 0.083% nebulizer solution Take 3 mLs by nebulization every 6 hours as needed for Wheezing or Shortness of Breath  CHRISTIANA Doyle CNP   apixaban (ELIQUIS) 5 MG TABS tablet Take 1 tablet by mouth 2 times daily  CHRISTIANA eFlix CNP   Blood Glucose Monitoring Suppl (TRUE METRIX AIR GLUCOSE METER) w/Device KIT 1 kit by Does not apply route 2 times daily  CHRISTIANA Felix CNP   blood glucose test strips (ASCENSIA AUTODISC VI;ONE TOUCH ULTRA TEST VI) strip 1 each by Other route 2 times daily As needed. CHRISTIANA Felix CNP   ranolazine (RANEXA) 500 MG extended release tablet Take 500 mg by mouth 2 times daily  Historical Provider, MD   Blood Pressure Monitoring (BLOOD PRESSURE CUFF) MISC 1 each by Does not apply route daily as needed (monitoring)  CHRSITIANA Felix CNP   Glucose Blood (BLOOD GLUCOSE TEST STRIPS) STRP Testing qd -bid prn  Lethia Stains, DO       Allergies   Allergen Reactions    Symbicort [Budesonide-Formoterol Fumarate] Swelling     Patient reports throat swelling.  Barium-Containing Compounds      \"had a reaction to a test, I think it was barium\"    Dye  [Iodides]      Other reaction(s): Wheezing  Red in color, hot, wheezing    Pravastatin      Statins cause severe muscle aches.     Statins Depletion Therapy          Subjective: Review of Systems   Constitutional: Negative for chills and fever. HENT: Positive for congestion. Eyes: Negative for pain and visual disturbance. Respiratory: Positive for cough. Gastrointestinal: Negative for diarrhea, nausea and vomiting. Genitourinary: Negative for decreased urine volume and difficulty urinating. Musculoskeletal: Negative for gait problem. Skin: Negative for pallor. Neurological: Negative for dizziness, light-headedness and headaches. Objective:     Physical Exam  Vitals and nursing note reviewed. Constitutional:       Appearance: Normal appearance. HENT:      Head: Normocephalic and atraumatic. Right Ear: Tympanic membrane, ear canal and external ear normal.      Left Ear: Tympanic membrane, ear canal and external ear normal.      Nose: Congestion present. Mouth/Throat:      Mouth: Mucous membranes are moist.   Eyes:      Extraocular Movements: Extraocular movements intact. Conjunctiva/sclera: Conjunctivae normal.      Pupils: Pupils are equal, round, and reactive to light. Cardiovascular:      Rate and Rhythm: Normal rate and regular rhythm. Pulses: Normal pulses. Pulmonary:      Effort: Pulmonary effort is normal. No tachypnea. Breath sounds: Decreased air movement present. Wheezing present. Abdominal:      General: Bowel sounds are normal.      Palpations: Abdomen is soft. Musculoskeletal:         General: Normal range of motion. Cervical back: Normal range of motion and neck supple. Skin:     General: Skin is warm and dry. Capillary Refill: Capillary refill takes less than 2 seconds. Coloration: Skin is not pale. Neurological:      Mental Status: He is alert and oriented to person, place, and time. Coordination: Coordination normal.      Gait: Gait normal.   Psychiatric:         Mood and Affect: Mood normal.         Behavior: Behavior normal.         Thought Content:  Thought content normal. MEDICAL DECISION MAKING Assessment/Plan:     Arelis Resendiz was seen today for hypertension. Diagnoses and all orders for this visit:    Hypertension, unspecified type    Community acquired pneumonia, unspecified laterality    Hospital discharge follow-up        Results for orders placed or performed during the hospital encounter of 11/14/21   COVID-19 & Influenza Combo    Specimen: Nasopharyngeal Swab   Result Value Ref Range    Specimen Description . NASOPHARYNGEAL SWAB     Source . NASOPHARYNGEAL SWAB     SARS-CoV-2 RNA, RT PCR Not Detected Not Detected    INFLUENZA A Not Detected Not Detected    INFLUENZA B Not Detected Not Detected   Respiratory Culture    Specimen: Sputum Induced   Result Value Ref Range    Specimen Description . INDUCED SPUTUM     Special Requests NOT REPORTED     Direct Exam < 10 EPITHELIAL CELLS/LPF     Direct Exam <10 NEUTROPHILS/LPF     Direct Exam MIXED BACTERIAL MORPHOTYPES SEEN ON GRAM STAIN.  (A)     Culture NORMAL RESPIRATORY JESUS MODERATE GROWTH    CBC Auto Differential   Result Value Ref Range    WBC 7.6 3.5 - 11.0 k/uL    RBC 5.27 4.5 - 5.9 m/uL    Hemoglobin 15.7 13.5 - 17.5 g/dL    Hematocrit 45.5 41 - 53 %    MCV 86.3 80 - 100 fL    MCH 29.8 26 - 34 pg    MCHC 34.5 31 - 37 g/dL    RDW 15.0 (H) 11.5 - 14.9 %    Platelets 066 389 - 959 k/uL    MPV 7.6 6.0 - 12.0 fL    NRBC Automated NOT REPORTED per 100 WBC    Differential Type NOT REPORTED     Seg Neutrophils 61 36 - 66 %    Lymphocytes 24 24 - 44 %    Monocytes 11 (H) 1 - 7 %    Eosinophils % 3 0 - 4 %    Basophils 1 0 - 2 %    Immature Granulocytes NOT REPORTED 0 %    Segs Absolute 4.70 1.3 - 9.1 k/uL    Absolute Lymph # 1.80 1.0 - 4.8 k/uL    Absolute Mono # 0.80 0.1 - 1.3 k/uL    Absolute Eos # 0.20 0.0 - 0.4 k/uL    Basophils Absolute 0.00 0.0 - 0.2 k/uL    Absolute Immature Granulocyte NOT REPORTED 0.00 - 0.30 k/uL    WBC Morphology NOT REPORTED     RBC Morphology NOT REPORTED     Platelet Estimate NOT REPORTED Comprehensive Metabolic Panel   Result Value Ref Range    Glucose 108 (H) 70 - 99 mg/dL    BUN 9 8 - 23 mg/dL    CREATININE 0.60 (L) 0.70 - 1.20 mg/dL    Bun/Cre Ratio NOT REPORTED 9 - 20    Calcium 8.8 8.6 - 10.4 mg/dL    Sodium 140 135 - 144 mmol/L    Potassium 3.3 (L) 3.7 - 5.3 mmol/L    Chloride 103 98 - 107 mmol/L    CO2 27 20 - 31 mmol/L    Anion Gap 10 9 - 17 mmol/L    Alkaline Phosphatase 60 40 - 129 U/L    ALT 16 5 - 41 U/L    AST 17 <40 U/L    Total Bilirubin 0.52 0.3 - 1.2 mg/dL    Total Protein 6.8 6.4 - 8.3 g/dL    Albumin 3.7 3.5 - 5.2 g/dL    Albumin/Globulin Ratio NOT REPORTED 1.0 - 2.5    GFR Non-African American >60 >60 mL/min    GFR African American >60 >60 mL/min    GFR Comment          GFR Staging NOT REPORTED    Brain Natriuretic Peptide   Result Value Ref Range    Pro- (H) <300 pg/mL    BNP Interpretation NOT REPORTED    Troponin   Result Value Ref Range    Troponin, High Sensitivity 13 0 - 22 ng/L    Troponin T NOT REPORTED <0.03 ng/mL    Troponin Interp NOT REPORTED    Troponin   Result Value Ref Range    Troponin, High Sensitivity 12 0 - 22 ng/L    Troponin T NOT REPORTED <0.03 ng/mL    Troponin Interp NOT REPORTED    Hemoglobin A1c   Result Value Ref Range    Hemoglobin A1C 6.2 (H) 4.0 - 6.0 %    Estimated Avg Glucose 131 mg/dL   Basic Metabolic Panel w/ Reflex to MG   Result Value Ref Range    Glucose 144 (H) 70 - 99 mg/dL    BUN 9 8 - 23 mg/dL    CREATININE 0.74 0.70 - 1.20 mg/dL    Bun/Cre Ratio NOT REPORTED 9 - 20    Calcium 8.5 (L) 8.6 - 10.4 mg/dL    Sodium 138 135 - 144 mmol/L    Potassium 3.6 (L) 3.7 - 5.3 mmol/L    Chloride 100 98 - 107 mmol/L    CO2 31 20 - 31 mmol/L    Anion Gap 7 (L) 9 - 17 mmol/L    GFR Non-African American >60 >60 mL/min    GFR African American >60 >60 mL/min    GFR Comment          GFR Staging NOT REPORTED    Basic Metabolic Panel w/ Reflex to MG   Result Value Ref Range    Glucose 115 (H) 70 - 99 mg/dL    BUN 10 8 - 23 mg/dL    CREATININE 0.65 (L) 0.70 - 1.20 mg/dL    Bun/Cre Ratio NOT REPORTED 9 - 20    Calcium 9.1 8.6 - 10.4 mg/dL    Sodium 140 135 - 144 mmol/L    Potassium 3.7 3.7 - 5.3 mmol/L    Chloride 101 98 - 107 mmol/L    CO2 30 20 - 31 mmol/L    Anion Gap 9 9 - 17 mmol/L    GFR Non-African American >60 >60 mL/min    GFR African American >60 >60 mL/min    GFR Comment          GFR Staging NOT REPORTED    POC Glucose Fingerstick   Result Value Ref Range    POC Glucose 147 (H) 75 - 110 mg/dL   POC Glucose Fingerstick   Result Value Ref Range    POC Glucose 136 (H) 75 - 110 mg/dL   POC Glucose Fingerstick   Result Value Ref Range    POC Glucose 111 (H) 75 - 110 mg/dL   POC Glucose Fingerstick   Result Value Ref Range    POC Glucose 103 75 - 110 mg/dL   POC Glucose Fingerstick   Result Value Ref Range    POC Glucose 102 75 - 110 mg/dL   POC Glucose Fingerstick   Result Value Ref Range    POC Glucose 105 75 - 110 mg/dL   POC Glucose Fingerstick   Result Value Ref Range    POC Glucose 83 75 - 110 mg/dL   EKG 12 Lead   Result Value Ref Range    Ventricular Rate 78 BPM    Atrial Rate 78 BPM    QRS Duration 138 ms    Q-T Interval 454 ms    QTc Calculation (Bazett) 517 ms    P Axis 69 degrees    R Axis -71 degrees    T Axis 104 degrees       Patient presented with concern for elevated BP recently discharged from hospital for community acquired pneumonia. BP in office today was not significantly elevated. He has no chest pain, blurry vision, dizziness. Pt advised to continue on medications per hospital discharge and continue to observe blood pressure at home daily. Pt has appt to follow up with PCP beginning of December. Call with any questions or concerns. Go to the ER for any emergent concern. Patient given educational materials - see patientinstructions. Discussed use, benefit, and side effects of prescribed medications. All patient questions answered. Pt verbalized understanding. Instructed to continue current medications, diet and exercise. Patient agreed with treatment plan. Follow up as directed.      Electronically signed by CHRISTIANA Valero CNP on 11/22/2021 at 3:11 PM

## 2021-11-22 NOTE — PATIENT INSTRUCTIONS
Patient Education        High Blood Pressure: Care Instructions  Overview     It's normal for blood pressure to go up and down throughout the day. But if it stays up, you have high blood pressure. Another name for high blood pressure is hypertension. Despite what a lot of people think, high blood pressure usually doesn't cause headaches or make you feel dizzy or lightheaded. It usually has no symptoms. But it does increase your risk of stroke, heart attack, and other problems. You and your doctor will talk about your risks of these problems based on your blood pressure. Your doctor will give you a goal for your blood pressure. Your goal will be based on your health and your age. Lifestyle changes, such as eating healthy and being active, are always important to help lower blood pressure. You might also take medicine to reach your blood pressure goal.  Follow-up care is a key part of your treatment and safety. Be sure to make and go to all appointments, and call your doctor if you are having problems. It's also a good idea to know your test results and keep a list of the medicines you take. How can you care for yourself at home? Medical treatment  · If you stop taking your medicine, your blood pressure will go back up. You may take one or more types of medicine to lower your blood pressure. Be safe with medicines. Take your medicine exactly as prescribed. Call your doctor if you think you are having a problem with your medicine. · Talk to your doctor before you start taking aspirin every day. Aspirin can help certain people lower their risk of a heart attack or stroke. But taking aspirin isn't right for everyone, because it can cause serious bleeding. · See your doctor regularly. You may need to see the doctor more often at first or until your blood pressure comes down.   · If you are taking blood pressure medicine, talk to your doctor before you take decongestants or anti-inflammatory medicine, such as ibuprofen. Some of these medicines can raise blood pressure. · Learn how to check your blood pressure at home. Lifestyle changes  · Stay at a healthy weight. This is especially important if you put on weight around the waist. Losing even 10 pounds can help you lower your blood pressure. · If your doctor recommends it, get more exercise. Walking is a good choice. Bit by bit, increase the amount you walk every day. Try for at least 30 minutes on most days of the week. You also may want to swim, bike, or do other activities. · Avoid or limit alcohol. Talk to your doctor about whether you can drink any alcohol. · Try to limit how much sodium you eat to less than 2,300 milligrams (mg) a day. Your doctor may ask you to try to eat less than 1,500 mg a day. · Eat plenty of fruits (such as bananas and oranges), vegetables, legumes, whole grains, and low-fat dairy products. · Lower the amount of saturated fat in your diet. Saturated fat is found in animal products such as milk, cheese, and meat. Limiting these foods may help you lose weight and also lower your risk for heart disease. · Do not smoke. Smoking increases your risk for heart attack and stroke. If you need help quitting, talk to your doctor about stop-smoking programs and medicines. These can increase your chances of quitting for good. When should you call for help? Call 911  anytime you think you may need emergency care. This may mean having symptoms that suggest that your blood pressure is causing a serious heart or blood vessel problem. Your blood pressure may be over 180/120. For example, call 911 if:    · You have symptoms of a heart attack. These may include:  ? Chest pain or pressure, or a strange feeling in the chest.  ? Sweating. ? Shortness of breath. ? Nausea or vomiting. ? Pain, pressure, or a strange feeling in the back, neck, jaw, or upper belly or in one or both shoulders or arms. ? Lightheadedness or sudden weakness.   ? A fast or irregular heartbeat.     · You have symptoms of a stroke. These may include:  ? Sudden numbness, tingling, weakness, or loss of movement in your face, arm, or leg, especially on only one side of your body. ? Sudden vision changes. ? Sudden trouble speaking. ? Sudden confusion or trouble understanding simple statements. ? Sudden problems with walking or balance. ? A sudden, severe headache that is different from past headaches.     · You have severe back or belly pain. Do not wait until your blood pressure comes down on its own. Get help right away. Call your doctor now or seek immediate care if:    · Your blood pressure is much higher than normal (such as 180/120 or higher), but you don't have symptoms.     · You think high blood pressure is causing symptoms, such as:  ? Severe headache.  ? Blurry vision. Watch closely for changes in your health, and be sure to contact your doctor if:    · Your blood pressure measures higher than your doctor recommends at least 2 times. That means the top number is higher or the bottom number is higher, or both.     · You think you may be having side effects from your blood pressure medicine. Where can you learn more? Go to https://Gramovoxpepiceweb.[employer]belle. org and sign in to your [employer] account. Enter M141 in the SANDOW box to learn more about \"High Blood Pressure: Care Instructions. \"     If you do not have an account, please click on the \"Sign Up Now\" link. Current as of: April 29, 2021               Content Version: 13.0  © 2006-2021 Healthwise, Incorporated. Care instructions adapted under license by [facility] ([facility]). If you have questions about a medical condition or this instruction, always ask your healthcare professional. [place] [address] any warranty or liability for your use of this information. Patient Education        Pneumonia: Care Instructions  Overview     Pneumonia is an infection of the lungs.  Most cases are caused by infections from bacteria or viruses. Pneumonia may be mild or very severe. If it is caused by bacteria, you will be treated with antibiotics. It may take a few weeks to a few months to recover fully from pneumonia, depending on how sick you were and whether your overall health is good. Follow-up care is a key part of your treatment and safety. Be sure to make and go to all appointments, and call your doctor if you are having problems. It's also a good idea to know your test results and keep a list of the medicines you take. How can you care for yourself at home? · Take your antibiotics exactly as directed. Do not stop taking the medicine just because you are feeling better. You need to take the full course of antibiotics. · Take your medicines exactly as prescribed. Call your doctor if you think you are having a problem with your medicine. · Get plenty of rest and sleep. You may feel weak and tired for a while, but your energy level will improve with time. · To prevent dehydration, drink plenty of fluids. Choose water and other clear liquids. If you have kidney, heart, or liver disease and have to limit fluids, talk with your doctor before you increase the amount of fluids you drink. · Take care of your cough so you can rest. A cough that brings up mucus from your lungs is common with pneumonia. It is one way your body gets rid of the infection. But if coughing keeps you from resting or causes severe fatigue and chest-wall pain, talk to your doctor. Your doctor may suggest that you take a medicine to reduce the cough. · Use a vaporizer or humidifier to add moisture to your bedroom. Follow the directions for cleaning the machine. · Do not smoke or allow others to smoke around you. Smoke will make your cough last longer. If you need help quitting, talk to your doctor about stop-smoking programs and medicines. These can increase your chances of quitting for good.   · Take an over-the-counter pain medicine, such as acetaminophen (Tylenol), ibuprofen (Advil, Motrin), or naproxen (Aleve). Read and follow all instructions on the label. · Do not take two or more pain medicines at the same time unless the doctor told you to. Many pain medicines have acetaminophen, which is Tylenol. Too much acetaminophen (Tylenol) can be harmful. · If you were given a spirometer to measure how well your lungs are working, use it as instructed. This can help your doctor tell how your recovery is going. · To prevent pneumonia in the future, talk to your doctor about getting a flu vaccine (once a year) and a pneumococcal vaccine (one time only for most people). When should you call for help? Call 911 anytime you think you may need emergency care. For example, call if:    · You have severe trouble breathing. Call your doctor now or seek immediate medical care if:    · You cough up dark brown or bloody mucus (sputum).     · You have new or worse trouble breathing.     · You are dizzy or lightheaded, or you feel like you may faint. Watch closely for changes in your health, and be sure to contact your doctor if:    · You have a new or higher fever.     · You are coughing more deeply or more often.     · You are not getting better after 2 days (48 hours).     · You do not get better as expected. Where can you learn more? Go to https://MyJobCompanypeenStage.[email or web address] org and sign in to your [employer] account. Enter D336 in the [employer] box to learn more about \"Pneumonia: Care Instructions. \"     If you do not have an account, please click on the \"Sign Up Now\" link. Current as of: July 6, 2021               Content Version: 13.0  © 7291-6757 Healthwise, Incorporated. Care instructions adapted under license by [facility] ([facility]).  If you have questions about a medical condition or this instruction, always ask your healthcare professional. [address] any warranty or liability for your use of this information.

## 2021-11-24 ENCOUNTER — CARE COORDINATION (OUTPATIENT)
Dept: CASE MANAGEMENT | Age: 71
End: 2021-11-24

## 2021-11-24 NOTE — CARE COORDINATION
factors: Obtained and reviewed discharge summary and/or continuity of care documents      Non-[facility] follow up appointment(s): n/a    CTN provided contact information for future needs. Plan for follow-up call in 7-10 days based on severity of symptoms and risk factors. Plan for next call: symptom management-dyspnea  cough and follow up appointment-with PCP and pulmonologist           Care Transitions Subsequent and Final Call    Subsequent and Final Calls  Do you have any ongoing symptoms?: Yes  Patient-reported symptoms: Shortness of Breath  Have your medications changed?: No  Do you have any questions related to your medications?: No  Do you currently have any active services?: No  Do you have any needs or concerns that I can assist you with?: No  Identified Barriers: None  Care Transitions Interventions  No Identified Needs  Other Interventions:            Follow Up  Future Appointments   Date Time Provider Lucio Pascual   1/24/2022 10:15 AM Jeremiah Hector MD [facility] HEART/VAS [address] [facility], [address] [facility] Care Transitions Nurse   [phone]

## 2021-12-02 ENCOUNTER — CARE COORDINATION (OUTPATIENT)
Dept: CASE MANAGEMENT | Age: 71
End: 2021-12-02

## 2021-12-02 NOTE — CARE COORDINATION
Shameka Wilson 12/23/21    CTN provided contact information for future needs. No further follow-up call indicated based on severity of symptoms and risk factors. Plan for next call: n/a          Care Transitions Subsequent and Final Call    Subsequent and Final Calls  Care Transitions Interventions  Other Interventions:            Follow Up  Future Appointments   Date Time Provider Lucio Pascual   1/24/2022 10:15 AM Jeremiah Tobar MD [facility] HEART/VAS [address] Care Transitions Nurse   [phone]

## 2022-01-21 ENCOUNTER — HOSPITAL ENCOUNTER (OUTPATIENT)
Dept: VASCULAR LAB | Age: 72
Discharge: HOME | End: 2022-01-21
Payer: MEDICARE

## 2022-01-21 DIAGNOSIS — I65.23 CAROTID STENOSIS, ASYMPTOMATIC, BILATERAL: ICD-10-CM

## 2022-01-21 PROCEDURE — 93971 EXTREMITY STUDY: CPT

## 2022-02-17 ENCOUNTER — HOSPITAL ENCOUNTER (OUTPATIENT)
Age: 72
Setting detail: SPECIMEN
Discharge: HOME | End: 2022-02-17

## 2022-02-17 LAB
ALBUMIN SERPL-MCNC: 4.3 G/DL (ref 3.5–5.2)
ALBUMIN/GLOBULIN RATIO: 1.8 (ref 1–2.5)
ALP BLD-CCNC: 68 U/L (ref 40–129)
ALT SERPL-CCNC: 16 U/L (ref 5–41)
ANION GAP SERPL CALCULATED.3IONS-SCNC: 14 MMOL/L (ref 9–17)
AST SERPL-CCNC: 16 U/L
BILIRUB SERPL-MCNC: 0.32 MG/DL (ref 0.3–1.2)
BUN BLDV-MCNC: 10 MG/DL (ref 8–23)
BUN/CREAT BLD: ABNORMAL (ref 9–20)
CALCIUM SERPL-MCNC: 9 MG/DL (ref 8.6–10.4)
CHLORIDE BLD-SCNC: 101 MMOL/L (ref 98–107)
CHOLESTEROL, FASTING: 151 MG/DL
CHOLESTEROL/HDL RATIO: 4
CO2: 26 MMOL/L (ref 20–31)
CREAT SERPL-MCNC: 0.72 MG/DL (ref 0.7–1.2)
GFR AFRICAN AMERICAN: >60 ML/MIN
GFR NON-AFRICAN AMERICAN: >60 ML/MIN
GFR SERPL CREATININE-BSD FRML MDRD: ABNORMAL ML/MIN/{1.73_M2}
GFR SERPL CREATININE-BSD FRML MDRD: ABNORMAL ML/MIN/{1.73_M2}
GLUCOSE BLD-MCNC: 110 MG/DL (ref 70–99)
HDLC SERPL-MCNC: 38 MG/DL
LDL CHOLESTEROL: 74 MG/DL (ref 0–130)
POTASSIUM SERPL-SCNC: 4 MMOL/L (ref 3.7–5.3)
SODIUM BLD-SCNC: 141 MMOL/L (ref 135–144)
TOTAL PROTEIN: 6.7 G/DL (ref 6.4–8.3)
TRIGLYCERIDE, FASTING: 193 MG/DL
VLDLC SERPL CALC-MCNC: ABNORMAL MG/DL (ref 1–30)

## 2022-02-28 ENCOUNTER — HOSPITAL ENCOUNTER (OUTPATIENT)
Dept: NON INVASIVE DIAGNOSTICS | Age: 72
Discharge: HOME | End: 2022-02-28
Payer: MEDICARE

## 2022-02-28 ENCOUNTER — HOSPITAL ENCOUNTER (OUTPATIENT)
Dept: NUCLEAR MEDICINE | Age: 72
Discharge: HOME | End: 2022-03-02
Payer: MEDICARE

## 2022-02-28 DIAGNOSIS — R94.31 ABNORMAL ECG: ICD-10-CM

## 2022-02-28 DIAGNOSIS — R94.31 ABNORMAL EKG: ICD-10-CM

## 2022-02-28 DIAGNOSIS — I10 HYPERTENSION, UNSPECIFIED TYPE: ICD-10-CM

## 2022-02-28 DIAGNOSIS — I25.119 CORONARY ARTERY DISEASE INVOLVING NATIVE CORONARY ARTERY WITH ANGINA PECTORIS, UNSPECIFIED WHETHER NATIVE OR TRANSPLANTED HEART (HCC): ICD-10-CM

## 2022-02-28 DIAGNOSIS — Z95.5 HX OF HEART ARTERY STENT: ICD-10-CM

## 2022-02-28 DIAGNOSIS — I10 HYPERTENSION, ESSENTIAL: ICD-10-CM

## 2022-02-28 LAB
LV EF: 48 %
LV EF: 53 %
LVEF MODALITY: NORMAL
LVEF MODALITY: NORMAL

## 2022-02-28 PROCEDURE — 93306 TTE W/DOPPLER COMPLETE: CPT

## 2022-02-28 PROCEDURE — 2580000003 HC RX 258: Performed by: INTERNAL MEDICINE

## 2022-02-28 PROCEDURE — 3430000000 HC RX DIAGNOSTIC RADIOPHARMACEUTICAL: Performed by: INTERNAL MEDICINE

## 2022-02-28 PROCEDURE — 93017 CV STRESS TEST TRACING ONLY: CPT

## 2022-02-28 PROCEDURE — A9500 TC99M SESTAMIBI: HCPCS | Performed by: INTERNAL MEDICINE

## 2022-02-28 PROCEDURE — 78452 HT MUSCLE IMAGE SPECT MULT: CPT

## 2022-02-28 PROCEDURE — 6360000002 HC RX W HCPCS: Performed by: INTERNAL MEDICINE

## 2022-02-28 RX ORDER — METOPROLOL TARTRATE 5 MG/5ML
5 INJECTION INTRAVENOUS EVERY 5 MIN PRN
Status: DISCONTINUED | OUTPATIENT
Start: 2022-02-28 | End: 2022-02-28 | Stop reason: HOSPADM

## 2022-02-28 RX ORDER — ATROPINE SULFATE 0.1 MG/ML
0.5 INJECTION INTRAVENOUS EVERY 5 MIN PRN
Status: DISCONTINUED | OUTPATIENT
Start: 2022-02-28 | End: 2022-02-28 | Stop reason: HOSPADM

## 2022-02-28 RX ORDER — AMINOPHYLLINE DIHYDRATE 25 MG/ML
50 INJECTION, SOLUTION INTRAVENOUS PRN
Status: DISCONTINUED | OUTPATIENT
Start: 2022-02-28 | End: 2022-02-28 | Stop reason: HOSPADM

## 2022-02-28 RX ORDER — ALBUTEROL SULFATE 90 UG/1
2 AEROSOL, METERED RESPIRATORY (INHALATION) PRN
Status: DISCONTINUED | OUTPATIENT
Start: 2022-02-28 | End: 2022-02-28 | Stop reason: HOSPADM

## 2022-02-28 RX ORDER — SODIUM CHLORIDE 9 MG/ML
500 INJECTION, SOLUTION INTRAVENOUS CONTINUOUS PRN
Status: DISCONTINUED | OUTPATIENT
Start: 2022-02-28 | End: 2022-02-28 | Stop reason: HOSPADM

## 2022-02-28 RX ORDER — SODIUM CHLORIDE 0.9 % (FLUSH) 0.9 %
5-40 SYRINGE (ML) INJECTION PRN
Status: DISCONTINUED | OUTPATIENT
Start: 2022-02-28 | End: 2022-02-28 | Stop reason: HOSPADM

## 2022-02-28 RX ORDER — SODIUM CHLORIDE 0.9 % (FLUSH) 0.9 %
10 SYRINGE (ML) INJECTION PRN
Status: DISCONTINUED | OUTPATIENT
Start: 2022-02-28 | End: 2022-03-03 | Stop reason: HOSPADM

## 2022-02-28 RX ORDER — NITROGLYCERIN 0.4 MG/1
0.4 TABLET SUBLINGUAL EVERY 5 MIN PRN
Status: DISCONTINUED | OUTPATIENT
Start: 2022-02-28 | End: 2022-02-28 | Stop reason: HOSPADM

## 2022-02-28 RX ADMIN — TETRAKIS(2-METHOXYISOBUTYLISOCYANIDE)COPPER(I) TETRAFLUOROBORATE 34.3 MILLICURIE: 1 INJECTION, POWDER, LYOPHILIZED, FOR SOLUTION INTRAVENOUS at 10:10

## 2022-02-28 RX ADMIN — REGADENOSON 0.4 MG: 0.08 INJECTION, SOLUTION INTRAVENOUS at 10:09

## 2022-02-28 RX ADMIN — Medication 10 ML: at 09:50

## 2022-02-28 RX ADMIN — Medication 10 ML: at 08:28

## 2022-02-28 RX ADMIN — AMINOPHYLLINE 50 MG: 25 INJECTION, SOLUTION INTRAVENOUS at 10:13

## 2022-02-28 RX ADMIN — TETRAKIS(2-METHOXYISOBUTYLISOCYANIDE)COPPER(I) TETRAFLUOROBORATE 10.3 MILLICURIE: 1 INJECTION, POWDER, LYOPHILIZED, FOR SOLUTION INTRAVENOUS at 08:28

## 2022-02-28 NOTE — PROCEDURES
[address] [facility]                    [address] [facility]                              CARDIAC STRESS TEST    PATIENT NAME: Bryan CRAVEN                   :        1950  MED REC NO:   [id number]                              ROOM:  ACCOUNT NO:   [de-identified]                           ADMIT DATE: 2022  PROVIDER:     Jonna Gil    DATE OF STUDY:  2022    TEST TYPE: LEXISCAN CARDIOLYTE STRESS TEST  INDICATION: ABNORMAL EKG, CORONARY ARTERY DISEASE, HYPERTENSION, ATRIAL  FIBRILLATION, STENTS  REFERRING PHYSICIAN: DR. Mary Jo PÉREZ    RESTING HEART RATE: 68 BEATS PER MINUTE  RESTING BLOOD PRESSURE: 144/74    MEDICATION(S) GIVEN: 0.4 MG IV LEXISCAN, 50 MG AMINOPHYLLINE  REASON FOR TERMINATION: MEDICATION INFUSION COMPLETE    RESTING EKG: ABNORMAL  COMMENTS: NORMAL SINUS RHYTHM, FIRST DEGREE/A PACED, AV BLOCK, PREMATURE  VENTRICULAR CONTRACTION, INCOMPLETE RIGHT BUNDLE BRANCH BLOCK  STRESS HEART RESPONSE: NORMAL RESPONSE  BLOOD PRESSURE RESPONSE: APPROPRIATE  STRESS EKGs: NORMAL  CHEST DISCOMFORT: CHEST HEAVINESS, NAUSEA, SOB  ISCHEMIC EKG CHANGES: NONE    EKG IMPRESSION: ELECTROCARDIOGRAPHICALLY NEGATIVE LEXISCAN STRESS TEST. RADIOISOTOPE RESULTS TO FOLLOW FROM THE DEPARTMENT OF NUCLEAR MEDICINE.     COMMENTS: NO ARRHYTHMIA      DONALD CORONA    D: 2022 13:01:33       T: 2022 13:06:37     [id number]  Job#: [id number]     Doc#: Unknown    CC:    (Retain this field even if not dictated or not decipherable)

## 2022-02-28 NOTE — PROCEDURES
[address] [facility]                    [address] [facility]. [address]                              CARDIAC STRESS TEST    PATIENT NAME: Karla Butcher                   :        1950  MED REC NO:   [id number]                              ROOM:  ACCOUNT NO:   [de-identified]                           ADMIT DATE: 2022  PROVIDER:     Nancy Landeros    DATE OF STUDY:  2022    TEST TYPE: LEXISCAN CARDIOLYTE STRESS TEST  INDICATION: ABNORMAL EKG, CORONARY ARTERY DISEASE, HYPERTENSION  REFERRING PHYSICIAN: DR. Jimmy Torres. ELISA    RESTING HEART RATE: 68 BEATS PER MINUTE  RESTING BLOOD PRESSURE: 144/74    MEDICATION(S) GIVEN: 0.4 MG IV LEXISCAN  REASON FOR TERMINATION: MEDICATION INFUSION COMPLETE    RESTING EKG: ABNORMAL  COMMENTS: NORMAL SINUS RHYTHM, FIRST DEGREE, A-PACED, AV BLOCK,  PREMATURE VENTRICULAR CONTRACTION, INCOMPLETE RIGHT BUNDLE BRANCH BLOCK  STRESS HEART RESPONSE: NORMAL RESPONSE  BLOOD PRESSURE RESPONSE: APPROPRIATE  STRESS EKGs: NORMAL  CHEST DISCOMFORT: CHEST HEAVINESS, NAUSEA, SHORTNESS OF BREATH  ISCHEMIC EKG CHANGES: NONE    EKG IMPRESSION: ELECTROCARDIOGRAPHICALLY NEGATIVE LEXISCAN STRESS TEST. RADIOISOTOPE RESULTS TO FOLLOW FROM THE DEPARTMENT OF NUCLEAR MEDICINE.     COMMENTS: NO ARRHYTHMIA        DONALD CORONA    D: 2022 12:33:03       T: 2022 12:35:28     [id number]  Job#: [id number]     Doc#: Unknown    CC:    (Retain this field even if not dictated or not decipherable)

## 2022-02-28 NOTE — PROGRESS NOTES
Patient presents for scheduled Lexiscan. Stress Tech performs patient preparation of physical comfort, review test procedures, pre-stress EKG. Lung Sounds clear t/o. Consent verified. Educated patient on test procedure and possible side effects of Lexiscan. Cardiologist reviewed pre-test EKG and is present for test. Patient tolerated test well with SOB, \"chest heavy\", stomach upset which resolved to baseline after 50 mg iv aminophylline per dr Stan Kendall and po caffeine. Ekg portion complete, nuc med portion pending.

## 2022-04-25 ENCOUNTER — HOSPITAL ENCOUNTER (OUTPATIENT)
Dept: CARDIAC CATH/INVASIVE PROCEDURES | Age: 72
Discharge: HOME | End: 2022-04-25
Payer: MEDICARE

## 2022-04-25 VITALS
BODY MASS INDEX: 32.93 KG/M2 | DIASTOLIC BLOOD PRESSURE: 67 MMHG | WEIGHT: 230 LBS | TEMPERATURE: 98.2 F | RESPIRATION RATE: 15 BRPM | HEART RATE: 71 BPM | SYSTOLIC BLOOD PRESSURE: 122 MMHG | OXYGEN SATURATION: 97 % | HEIGHT: 70 IN

## 2022-04-25 DIAGNOSIS — Z95.820 S/P ANGIOPLASTY WITH STENT: ICD-10-CM

## 2022-04-25 LAB
ACTIVATED CLOTTING TIME: 305 SEC (ref 79–149)
GFR NON-AFRICAN AMERICAN: >60 ML/MIN
GFR SERPL CREATININE-BSD FRML MDRD: >60 ML/MIN
GFR SERPL CREATININE-BSD FRML MDRD: NORMAL ML/MIN/{1.73_M2}
GLUCOSE BLD-MCNC: 120 MG/DL (ref 74–100)
PLATELET # BLD: 177 K/UL (ref 138–453)
POC BUN: 13 MG/DL (ref 8–26)
POC CHLORIDE: 104 MMOL/L (ref 98–107)
POC CREATININE: 0.74 MG/DL (ref 0.51–1.19)
POC HEMATOCRIT: 50 % (ref 41–53)
POC HEMOGLOBIN: 17.1 G/DL (ref 13.5–17.5)
POC POTASSIUM: 4 MMOL/L (ref 3.5–4.5)
POC SODIUM: 141 MMOL/L (ref 138–146)

## 2022-04-25 PROCEDURE — 84295 ASSAY OF SERUM SODIUM: CPT

## 2022-04-25 PROCEDURE — C1887 CATHETER, GUIDING: HCPCS

## 2022-04-25 PROCEDURE — 2709999900 HC NON-CHARGEABLE SUPPLY

## 2022-04-25 PROCEDURE — 7100000000 HC PACU RECOVERY - FIRST 15 MIN

## 2022-04-25 PROCEDURE — C1769 GUIDE WIRE: HCPCS

## 2022-04-25 PROCEDURE — 85014 HEMATOCRIT: CPT

## 2022-04-25 PROCEDURE — 99152 MOD SED SAME PHYS/QHP 5/>YRS: CPT

## 2022-04-25 PROCEDURE — 2500000003 HC RX 250 WO HCPCS

## 2022-04-25 PROCEDURE — 93458 L HRT ARTERY/VENTRICLE ANGIO: CPT

## 2022-04-25 PROCEDURE — 84520 ASSAY OF UREA NITROGEN: CPT

## 2022-04-25 PROCEDURE — C1874 STENT, COATED/COV W/DEL SYS: HCPCS

## 2022-04-25 PROCEDURE — 6360000002 HC RX W HCPCS

## 2022-04-25 PROCEDURE — C1725 CATH, TRANSLUMIN NON-LASER: HCPCS

## 2022-04-25 PROCEDURE — 6370000000 HC RX 637 (ALT 250 FOR IP)

## 2022-04-25 PROCEDURE — 2580000003 HC RX 258: Performed by: INTERNAL MEDICINE

## 2022-04-25 PROCEDURE — 82565 ASSAY OF CREATININE: CPT

## 2022-04-25 PROCEDURE — 85049 AUTOMATED PLATELET COUNT: CPT

## 2022-04-25 PROCEDURE — 7100000001 HC PACU RECOVERY - ADDTL 15 MIN

## 2022-04-25 PROCEDURE — 99153 MOD SED SAME PHYS/QHP EA: CPT

## 2022-04-25 PROCEDURE — 85347 COAGULATION TIME ACTIVATED: CPT

## 2022-04-25 PROCEDURE — C9600 PERC DRUG-EL COR STENT SING: HCPCS

## 2022-04-25 PROCEDURE — 6360000004 HC RX CONTRAST MEDICATION

## 2022-04-25 PROCEDURE — 82435 ASSAY OF BLOOD CHLORIDE: CPT

## 2022-04-25 PROCEDURE — C1894 INTRO/SHEATH, NON-LASER: HCPCS

## 2022-04-25 PROCEDURE — 82947 ASSAY GLUCOSE BLOOD QUANT: CPT

## 2022-04-25 PROCEDURE — 84132 ASSAY OF SERUM POTASSIUM: CPT

## 2022-04-25 RX ORDER — VARENICLINE TARTRATE 0.5 MG/1
1 TABLET, FILM COATED ORAL 2 TIMES DAILY
COMMUNITY

## 2022-04-25 RX ORDER — SODIUM CHLORIDE 9 MG/ML
INJECTION, SOLUTION INTRAVENOUS PRN
Status: DISCONTINUED | OUTPATIENT
Start: 2022-04-25 | End: 2022-04-26 | Stop reason: HOSPADM

## 2022-04-25 RX ORDER — CLOPIDOGREL BISULFATE 75 MG/1
75 TABLET ORAL DAILY
Qty: 30 TABLET | Refills: 3 | Status: SHIPPED | OUTPATIENT
Start: 2022-04-25

## 2022-04-25 RX ORDER — METHYLPREDNISOLONE SODIUM SUCCINATE 125 MG/2ML
125 INJECTION, POWDER, LYOPHILIZED, FOR SOLUTION INTRAMUSCULAR; INTRAVENOUS ONCE
Status: DISCONTINUED | OUTPATIENT
Start: 2022-04-25 | End: 2022-04-26 | Stop reason: HOSPADM

## 2022-04-25 RX ORDER — SODIUM CHLORIDE 0.9 % (FLUSH) 0.9 %
5-40 SYRINGE (ML) INJECTION PRN
Status: DISCONTINUED | OUTPATIENT
Start: 2022-04-25 | End: 2022-04-26 | Stop reason: HOSPADM

## 2022-04-25 RX ORDER — METOPROLOL SUCCINATE 25 MG/1
25 TABLET, EXTENDED RELEASE ORAL DAILY
COMMUNITY

## 2022-04-25 RX ORDER — ACETAMINOPHEN 325 MG/1
650 TABLET ORAL EVERY 4 HOURS PRN
Status: DISCONTINUED | OUTPATIENT
Start: 2022-04-25 | End: 2022-04-26 | Stop reason: HOSPADM

## 2022-04-25 RX ORDER — SODIUM CHLORIDE 0.9 % (FLUSH) 0.9 %
5-40 SYRINGE (ML) INJECTION EVERY 12 HOURS SCHEDULED
Status: DISCONTINUED | OUTPATIENT
Start: 2022-04-25 | End: 2022-04-26 | Stop reason: HOSPADM

## 2022-04-25 RX ADMIN — SODIUM CHLORIDE: 9 INJECTION, SOLUTION INTRAVENOUS at 14:38

## 2022-04-25 NOTE — PROGRESS NOTES
Patient received post cath to [facility] room 7. Assessment obtained. Post cath pathway initiated. Right radial site with TR band inflated with 9 mL of air intact. No hematoma noted. Patient without complaints.

## 2022-04-25 NOTE — H&P
[facility]  Procedure History and Physical Update          Patient Name: Merry Young  MRN:    [id number]  YOB: 1950  Date of evaluation:  4/25/2022    Procedure:    Cardiac cath +/- PCI    Indication for procedure:  Abnormal stress test      Please refer to the office note completed by JIMMIE gresham  on 4/5/2022 in the medical record and note that:    [x] I have examined the patient and reviewed the H&P/Consult and there are no changes to be made to the assessment or plan.     [] I have examined the patient and reviewed the H&P/Consult and have noted the following changes:              Past Medical History:   Diagnosis Date    Abnormal cardiovascular stress test     Anxiety disorder 08/31/2020    Atrial flutter ([facility] 05/17/2018    ELIQUIS    CAD (coronary artery disease)     Callus of foot     rt     COPD (chronic obstructive pulmonary disease) ([facility]     Former smoker     GERD (gastroesophageal reflux disease)     H/O cardiovascular stress test 09/2017    H/O non-A, non-B viral hepatitis     Hyperlipidemia     Hypertension     Marijuana use     MI (myocardial infarction) ([facility]     Obesity     Osteoarthritis     Pneumonia     Poor compliance     Rotator cuff tear     bilat    Shoulder pain, bilateral     Type II or unspecified type diabetes mellitus without mention of complication, not stated as uncontrolled        Past Surgical History:   Procedure Laterality Date    CARDIAC CATHETERIZATION  05/17/2018    LAD PROX 50% / MID DIFFUSE 45-50% / LCX PROX 50% WITH PATENT RCA STENT    CARDIAC CATHETERIZATION  2011    CARDIAC CATHETERIZATION  04/25/2022    COLONOSCOPY      COLONOSCOPY  10/30/2020    COLONOSCOPY N/A 10/30/2020    COLORECTAL CANCER SCREENING, NOT HIGH RISK performed by Siobhan Doty MD at [facility]  04/23/2009    Mid RCA (Liberte 3.5 x 32) Proximal RCA (4x20)    PACEMAKER INSERTION  07/12/2019 Dr. Varsha Madison  /  [facility]       Family History   Problem Relation Age of Onset    Diabetes Other     Heart Disease Other     High Blood Pressure Other     Cancer Other     COPD Other     Arthritis Other        Allergies   Allergen Reactions    Symbicort [Budesonide-Formoterol Fumarate] Swelling     Patient reports throat swelling.  Barium-Containing Compounds      \"had a reaction to a test, I think it was barium\"    Dye  [Iodides]      Other reaction(s): Wheezing  Red in color, hot, wheezing    Pravastatin      Statins cause severe muscle aches.  Statins Depletion Therapy        Prior to Admission medications    Medication Sig Start Date End Date Taking?  Authorizing Provider   varenicline (CHANTIX) 0.5 MG tablet Take 1 mg by mouth 2 times daily    Yes Historical Provider, MD   metoprolol succinate (TOPROL XL) 25 MG extended release tablet Take 25 mg by mouth daily   Yes Historical Provider, MD [place] 057 MG/ML SOAJ inject 1 PEN INJECTOR SUBCUTANEOUSLU EVERY 2 WEEKS inject ON THE 1ST AND 15TH OF EVERY MONTH 11/16/21   Historical Provider, MD   lisinopril (PRINIVIL;ZESTRIL) 5 MG tablet take 1 tablet by mouth twice a day  Patient taking differently: Take 5 mg by mouth at bedtime  11/12/21   CHRISTIANA Ramesh CNP   Probiotic Acidophilus [facility]) TABS Take 1 tablet by mouth daily 7/16/21   Page Loredo MD   metFORMIN (GLUCOPHAGE) 500 MG tablet take 1 tablet by mouth WITH BREAKFAST then 2 tablets WITH EVENING MEAL  Patient taking differently: Take 500 mg by mouth 2 times daily (with meals)  7/16/21   CHRISTIANA Reeder CNP   albuterol sulfate  (90 Base) MCG/ACT inhaler Inhale 2 puffs into the lungs every 6 hours as needed for Shortness of Breath 7/16/21   CHRISTIANA Reeder CNP   nitroGLYCERIN (NITROSTAT) 0.4 MG SL tablet Place 1 tablet under the tongue every 5 minutes as needed for Chest pain 7/16/21   CHRISTIANA Reeder CNP   tamsulosin (FLOMAX) 0.4 MG capsule EOMI  Respiratory:  · Normal excursion and expansion without use of accessory muscles  · Resp Auscultation:  Good respiratory effort. No for increased work of breathing. On auscultation: clear to auscultation bilaterally  Cardiovascular:  · Regular rate and rhythm. · S1/S2  · No murmurs. · The apical impulse is not displaced  Abdomen:  · Soft  · Bowel sounds present  · Non-tender to palpation  Extremities:  · No cyanosis or clubbing  · Lower extremity edema: No.  Skin:  · Warm and dry  Neurological:  · Alert and oriented. · Moves all extremities well      Plan:  · Proceed with planned procedure. · Further orders to follow. Pre Procedure Conscious Sedation Data:     ASA Class:                  [] I [] II [x] III [] IV     Mallampati Class:       [] I [] II [x] III [] IV    Risks, benefits, and alternatives of cardiac catheterization were discussed, in detail, with patient. Risks include, but not limited to, bleeding, requiring blood transfusion, vascular complication requiring surgery, renal failure with need of dialysis, CVA, MI, death and anesthesia complications including intubation were discussed. Patient verbalized understanding and agreed to proceed with the procedure understanding the above risks and alternatives to the procedure.         Electronically signed on 04/25/22 at 2:44 PM by:    Gabriel Bojorquez MD, MD   Fellow, [id number] [facility]

## 2022-04-25 NOTE — OP NOTE
mL    Conclusions: In stent RCA stenosis 99%. 40% proximal Mid LAD stenosis (same as 2019). Preserved LV function. Successful PTCA-BERNADINE to mid RCA. Recommendations:  Post-cath protocol  Continue optimal medical therapy  Risk factor modification    Alan Prather MD       Cardiovascular Fellow PGY-4  4/25/2022, 3:51 PM        I have reviewed the case / procedure with resident / fellow  I have examined the patient personally  Patient agree with treatment plan as discussed before, final arrangement based on my evaluation and exam.    Risk and benefit of procedure planned were explained in details. Procedure was performed by me personally, with all aspect of the procedure being done using standard protocol. Note was modified based on my own assessment and treatment.     Juan C Armstrong MD  [facility]

## 2022-04-25 NOTE — PROGRESS NOTES
[x] DISCHARGE INSTRUCTIONS GIVEN WITH 2 WEEK APPT. MADE AND . DOCUMENTED     [x] STENT/PCI GUIDELINES GIVEN    [] ASA  PRESCRIBED POST PROCEDURE PAtient on Eliquis. Dr Pro Young verified no aspirin to be prescribed. [x] WAS A BETA BLOCKER ORDERED IF YES WAS SCRIPT GIVEN TO PT.    [x] IF EF < 45 % WAS AN ACE INHIBITOR ORDERED    [x] WAS PLAVIX,EFFIENT,BRILINTA, ORDERED IF YES WAS SCRIPT GIVEN TO       PT,AND INSTRUCTIONS ON IMPORTANCE OF MED    [x] DOES PT. NEED 30 & 90 DAY SCRIPTS    [x] CONCERNS ON PURCHASE OF ANTIPLATELETS IF YES.  SEE PROCEDURE ON      PROCESS FOR PT.S TO OBTAIN THESE MEDS    [x] IS PT. ON STATINS IF NO WERE STATINS ORDERED AND SCRIPT GIVEN    [x] WERE MEDS RECONCILED, WAS [employer] INFORMATION ON MEDS GIVEN     TO PT.    [x] PRINT DISCHARGE MED LIST CHECK AVS FOR CURRENT MEDS    [x] WAS STENT CARD GIVEN TO PT.

## 2022-04-25 NOTE — PROGRESS NOTES
Air removed fromVasc band in  2 mL increments until all air removed. No bleeding or hematoma noted. Pressure dressing applied, radial pulse palpable.

## 2022-04-26 NOTE — PROGRESS NOTES
Ambulated in halls. Gait steady. .     All discharge instructions reviewed, questions answered, paper signed and given copy. Patient discharged per ambulatory with friend and belongings.

## 2022-05-23 ENCOUNTER — OFFICE VISIT (OUTPATIENT)
Dept: VASCULAR SURGERY | Age: 72
End: 2022-05-23
Payer: MEDICARE

## 2022-05-23 VITALS
OXYGEN SATURATION: 98 % | DIASTOLIC BLOOD PRESSURE: 84 MMHG | HEART RATE: 62 BPM | TEMPERATURE: 97 F | WEIGHT: 239.2 LBS | SYSTOLIC BLOOD PRESSURE: 118 MMHG | HEIGHT: 70 IN | BODY MASS INDEX: 34.24 KG/M2

## 2022-05-23 DIAGNOSIS — I65.23 BILATERAL CAROTID ARTERY STENOSIS: Primary | ICD-10-CM

## 2022-05-23 PROCEDURE — 99213 OFFICE O/P EST LOW 20 MIN: CPT | Performed by: SURGERY

## 2022-05-23 RX ORDER — SENNOSIDES 8.6 MG
650 CAPSULE ORAL EVERY 8 HOURS PRN
COMMUNITY
Start: 2022-03-14

## 2022-05-23 ASSESSMENT — ENCOUNTER SYMPTOMS
COLOR CHANGE: 0
ABDOMINAL PAIN: 0
CHEST TIGHTNESS: 0
SHORTNESS OF BREATH: 0
EYE PAIN: 0
TROUBLE SWALLOWING: 0
VOMITING: 0
COUGH: 0
VOICE CHANGE: 0
ABDOMINAL DISTENTION: 0

## 2022-05-23 NOTE — PROGRESS NOTES
[address] AND VASCULAR  [address]  [facility] [address]  Dept: [phone]     Patient: Siobhan Ervin  : 1950  MRN: [id number]  DOS: 2022            HPI:  Siobhan Ervin is a 70 y.o. male who returns to the office regarding asymptomatic right carotid stenosis. 1 year ago he had carotid duplex revealing velocities consistent with approximately 50% stenosis. This year he has velocities corresponding to less than 50% stenosis. I suspect that he is teetering around a 50% stenosis in the right. It is as yet asymptomatic on the left has never been significantly stenosed. He recently had another coronary stent placed and is on Plavix and Eliquis. He is allergic to statin medications and is using Repatha. Review of Systems   Constitutional: Negative for activity change, fever and unexpected weight change. HENT: Negative for trouble swallowing and voice change. Eyes: Negative for pain and visual disturbance. Respiratory: Negative for cough, chest tightness and shortness of breath. Cardiovascular: Negative for chest pain and palpitations. Gastrointestinal: Negative for abdominal distention, abdominal pain and vomiting. Endocrine: Negative for cold intolerance and heat intolerance. Genitourinary: Negative for dysuria, flank pain and hematuria. Musculoskeletal: Negative for joint swelling and neck pain. Skin: Negative for color change and rash. Allergic/Immunologic: Negative for immunocompromised state. Neurological: Negative for syncope, speech difficulty, weakness, numbness and headaches. Hematological: Negative for adenopathy. Psychiatric/Behavioral: Negative for behavioral problems and suicidal ideas.        Vitals:    22 1035   BP: 118/84   Site: Left Upper Arm   Position: Sitting   Cuff Size: Large Adult   Pulse: 62   Temp: 97 °F (36.1 °C)   TempSrc: Infrared   SpO2: 98%   Weight: 239 lb 3.2 oz (108.5 kg)   Height: 5' 10\" (1.778 m)  Comment: per patient          Physical Exam  Constitutional:       General: He is not in acute distress. HENT:      Mouth/Throat:      Mouth: Mucous membranes are moist.      Pharynx: Oropharynx is clear. Eyes:      General: No scleral icterus. Extraocular Movements: Extraocular movements intact. Conjunctiva/sclera: Conjunctivae normal.   Neck:      Thyroid: No thyroid mass or thyromegaly. Cardiovascular:      Rate and Rhythm: Normal rate and regular rhythm. Heart sounds: No murmur heard. Comments: I cannot appreciate a bruit on either side. He has palpable distal pulses. His feet are warm and well-perfused. He has no ulceration or gangrene. He has no edema. Pulmonary:      Effort: No respiratory distress. Breath sounds: No rales. Abdominal:      General: There is no distension. Palpations: There is no mass. Tenderness: There is no abdominal tenderness. There is no guarding. Musculoskeletal:      Cervical back: No rigidity or tenderness. Lymphadenopathy:      Cervical: No cervical adenopathy. Skin:     Coloration: Skin is not jaundiced. Findings: No rash. Neurological:      General: No focal deficit present. Mental Status: He is alert and oriented to person, place, and time. Cranial Nerves: No cranial nerve deficit. Psychiatric:         Mood and Affect: Mood normal.         Assessment:  1. Bilateral carotid artery stenosis          Plan: At this point we will continue to follow him at a yearly interval.  If at 1 year his velocities correspond to less than 50% stenosis bilaterally then we can see him at 5-year intervals. He understands and agrees and we will see him at that time. I am pleased to say that he has stopped smoking cigarettes.     Electronically signed by:  Timoteo Friedman MD

## 2022-12-16 ENCOUNTER — HOSPITAL ENCOUNTER (OUTPATIENT)
Age: 72
Setting detail: SPECIMEN
Discharge: HOME | End: 2022-12-16

## 2022-12-16 LAB
ABSOLUTE EOS #: 0.27 K/UL (ref 0–0.44)
ABSOLUTE IMMATURE GRANULOCYTE: 0.03 K/UL (ref 0–0.3)
ABSOLUTE LYMPH #: 2.92 K/UL (ref 1.1–3.7)
ABSOLUTE MONO #: 0.69 K/UL (ref 0.1–1.2)
ALBUMIN SERPL-MCNC: 4.3 G/DL (ref 3.5–5.2)
ALBUMIN/GLOBULIN RATIO: 1.5 (ref 1–2.5)
ALP BLD-CCNC: 56 U/L (ref 40–129)
ALT SERPL-CCNC: 16 U/L (ref 5–41)
ANION GAP SERPL CALCULATED.3IONS-SCNC: 10 MMOL/L (ref 9–17)
AST SERPL-CCNC: 19 U/L
BASOPHILS # BLD: 0 % (ref 0–2)
BASOPHILS ABSOLUTE: 0.03 K/UL (ref 0–0.2)
BILIRUB SERPL-MCNC: 0.4 MG/DL (ref 0.3–1.2)
BUN BLDV-MCNC: 12 MG/DL (ref 8–23)
CALCIUM SERPL-MCNC: 9.5 MG/DL (ref 8.6–10.4)
CHLORIDE BLD-SCNC: 102 MMOL/L (ref 98–107)
CHOLESTEROL, FASTING: 114 MG/DL
CHOLESTEROL/HDL RATIO: 3.2
CO2: 27 MMOL/L (ref 20–31)
CREAT SERPL-MCNC: 0.9 MG/DL (ref 0.7–1.2)
EOSINOPHILS RELATIVE PERCENT: 3 % (ref 1–4)
ESTIMATED AVERAGE GLUCOSE: 126 MG/DL
GFR SERPL CREATININE-BSD FRML MDRD: >60 ML/MIN/1.73M2
GLUCOSE BLD-MCNC: 118 MG/DL (ref 70–99)
HBA1C MFR BLD: 6 % (ref 4–6)
HBV SURFACE AB TITR SER: 262.5 MIU/ML
HCT VFR BLD CALC: 50.3 % (ref 40.7–50.3)
HDLC SERPL-MCNC: 36 MG/DL
HEMOGLOBIN: 16.2 G/DL (ref 13–17)
HEPATITIS B SURFACE ANTIGEN: NONREACTIVE
HEPATITIS C ANTIBODY: NONREACTIVE
IMMATURE GRANULOCYTES: 0 %
LDL CHOLESTEROL: 40 MG/DL (ref 0–130)
LYMPHOCYTES # BLD: 36 % (ref 24–43)
MAGNESIUM: 1.4 MG/DL (ref 1.6–2.6)
MCH RBC QN AUTO: 29.5 PG (ref 25.2–33.5)
MCHC RBC AUTO-ENTMCNC: 32.2 G/DL (ref 28.4–34.8)
MCV RBC AUTO: 91.5 FL (ref 82.6–102.9)
MONOCYTES # BLD: 9 % (ref 3–12)
NRBC AUTOMATED: 0 PER 100 WBC
PDW BLD-RTO: 13.3 % (ref 11.8–14.4)
PLATELET # BLD: 192 K/UL (ref 138–453)
PMV BLD AUTO: 9.7 FL (ref 8.1–13.5)
POTASSIUM SERPL-SCNC: 4.4 MMOL/L (ref 3.7–5.3)
RBC # BLD: 5.5 M/UL (ref 4.21–5.77)
SEG NEUTROPHILS: 52 % (ref 36–65)
SEGMENTED NEUTROPHILS ABSOLUTE COUNT: 4.09 K/UL (ref 1.5–8.1)
SODIUM BLD-SCNC: 139 MMOL/L (ref 135–144)
TOTAL PROTEIN: 7.1 G/DL (ref 6.4–8.3)
TRIGLYCERIDE, FASTING: 189 MG/DL
TSH SERPL DL<=0.05 MIU/L-ACNC: 1.04 UIU/ML (ref 0.3–5)
WBC # BLD: 8 K/UL (ref 3.5–11.3)

## 2023-01-23 ENCOUNTER — HOSPITAL ENCOUNTER (OUTPATIENT)
Age: 73
Setting detail: SPECIMEN
Discharge: HOME | End: 2023-01-23

## 2023-01-23 ENCOUNTER — OFFICE VISIT (OUTPATIENT)
Dept: UROLOGY | Age: 73
End: 2023-01-23
Payer: MEDICARE

## 2023-01-23 VITALS
DIASTOLIC BLOOD PRESSURE: 72 MMHG | WEIGHT: 239 LBS | HEART RATE: 69 BPM | BODY MASS INDEX: 34.22 KG/M2 | TEMPERATURE: 97.8 F | HEIGHT: 70 IN | SYSTOLIC BLOOD PRESSURE: 135 MMHG | RESPIRATION RATE: 16 BRPM

## 2023-01-23 DIAGNOSIS — R30.0 DYSURIA: Primary | ICD-10-CM

## 2023-01-23 DIAGNOSIS — Z12.5 PROSTATE CANCER SCREENING: ICD-10-CM

## 2023-01-23 DIAGNOSIS — N13.8 BPH WITH OBSTRUCTION/LOWER URINARY TRACT SYMPTOMS: ICD-10-CM

## 2023-01-23 DIAGNOSIS — N40.1 BPH WITH OBSTRUCTION/LOWER URINARY TRACT SYMPTOMS: ICD-10-CM

## 2023-01-23 DIAGNOSIS — R39.12 WEAK URINARY STREAM: ICD-10-CM

## 2023-01-23 DIAGNOSIS — R30.0 DYSURIA: ICD-10-CM

## 2023-01-23 DIAGNOSIS — R35.1 NOCTURIA: ICD-10-CM

## 2023-01-23 LAB
APPEARANCE FLUID: CLEAR
BILIRUBIN, POC: NORMAL
BLOOD URINE, POC: NEGATIVE
CLARITY, POC: NORMAL
COLOR, POC: YELLOW
GLUCOSE URINE, POC: NEGATIVE
KETONES, POC: NORMAL
LEUKOCYTE EST, POC: NEGATIVE
NITRITE, POC: NEGATIVE
PH, POC: NORMAL
PROTEIN, POC: NEGATIVE
SPECIFIC GRAVITY, POC: NORMAL
UROBILINOGEN, POC: NORMAL

## 2023-01-23 PROCEDURE — 1123F ACP DISCUSS/DSCN MKR DOCD: CPT | Performed by: UROLOGY

## 2023-01-23 PROCEDURE — 3075F SYST BP GE 130 - 139MM HG: CPT | Performed by: UROLOGY

## 2023-01-23 PROCEDURE — G8484 FLU IMMUNIZE NO ADMIN: HCPCS | Performed by: UROLOGY

## 2023-01-23 PROCEDURE — 1036F TOBACCO NON-USER: CPT | Performed by: UROLOGY

## 2023-01-23 PROCEDURE — 3017F COLORECTAL CA SCREEN DOC REV: CPT | Performed by: UROLOGY

## 2023-01-23 PROCEDURE — 3078F DIAST BP <80 MM HG: CPT | Performed by: UROLOGY

## 2023-01-23 PROCEDURE — 99204 OFFICE O/P NEW MOD 45 MIN: CPT | Performed by: UROLOGY

## 2023-01-23 PROCEDURE — G8428 CUR MEDS NOT DOCUMENT: HCPCS | Performed by: UROLOGY

## 2023-01-23 PROCEDURE — 81002 URINALYSIS NONAUTO W/O SCOPE: CPT | Performed by: UROLOGY

## 2023-01-23 PROCEDURE — G8417 CALC BMI ABV UP PARAM F/U: HCPCS | Performed by: UROLOGY

## 2023-01-23 RX ORDER — ISOSORBIDE MONONITRATE 30 MG/1
TABLET, EXTENDED RELEASE ORAL
COMMUNITY
Start: 2023-01-08

## 2023-01-23 ASSESSMENT — ENCOUNTER SYMPTOMS
ABDOMINAL PAIN: 0
NAUSEA: 0
VOMITING: 0
BACK PAIN: 0
COUGH: 0
CONSTIPATION: 0
EYE PAIN: 0
WHEEZING: 0
SHORTNESS OF BREATH: 0
DIARRHEA: 0

## 2023-01-23 NOTE — PROGRESS NOTES
Review of Systems   Constitutional:  Negative for appetite change, chills, fatigue and fever. Eyes:  Negative for pain and visual disturbance. Respiratory:  Negative for cough, shortness of breath and wheezing. Cardiovascular:  Negative for chest pain and leg swelling. Gastrointestinal:  Negative for abdominal pain, constipation, diarrhea, nausea and vomiting. Genitourinary:  Positive for difficulty urinating and dysuria. Negative for frequency, hematuria, penile pain and testicular pain. Musculoskeletal:  Negative for back pain and myalgias. Neurological:  Negative for dizziness, tremors, weakness, light-headedness, numbness and headaches. Hematological:  Negative for adenopathy. Does not bruise/bleed easily.

## 2023-01-23 NOTE — PROGRESS NOTES
[address] [address]  Dept: [phone]  Dept Fax: [phone] [facility] Urology Office Note - New patient    Patient:  Mike Velasquez  YOB: 1950  Date: 1/23/2023    The patient is a 67 y.o. male who presents todayfor evaluation of the following problems:   Chief Complaint   Patient presents with    New Patient     Difficult urinating , burning    referred by CHRISTIANA Tobar CNP. HPI  This is a very pleasant 31-year-old gentleman who has a longstanding history of BPH. He has been on Flomax for many years. Recently, he did develop a UTI. He had dysuria as well as pelvic pain and difficulty emptying his bladder. He took a course of antibiotics about a month ago and is voiding somewhat better. He is still having a weak stream and sometimes has trouble emptying his bladder. (Patient's old records have been requested, reviewed and summarized in today's note.)    Summary of old records: N/A    Additional History: N/A    Procedures Today: N/A    Last several PSA's:  Lab Results   Component Value Date    PSA 1.90 10/12/2011     Last total testosterone:  No results found for: TESTOSTERONE  Urinalysis today:  No results found for this visit on 01/23/23. AUA Symptom Score (1/23/2023):                                Last BUN and creatinine:  Lab Results   Component Value Date    BUN 12 12/16/2022     Lab Results   Component Value Date    CREATININE 0.90 12/16/2022       Additional Lab/Culture results: none    Imaging Reviewed during this Office Visit: none  (results were independently reviewed by physician and radiology report verified)    PAST MEDICAL, FAMILY AND SOCIAL HISTORY:  Past Medical History:   Diagnosis Date    Abnormal cardiovascular stress test     Anxiety disorder 08/31/2020    Atrial flutter ([address] 05/17/2018    ELIQUIS    CAD (coronary artery disease) Callus of foot     rt     COPD (chronic obstructive pulmonary disease) (HCC)     Former smoker     GERD (gastroesophageal reflux disease)     H/O cardiovascular stress test 09/2017    H/O non-A, non-B viral hepatitis     Hyperlipidemia     Hypertension     Marijuana use     MI (myocardial infarction) ([facility] [address] [facility]     Obesity     Osteoarthritis     Pneumonia     Poor compliance     Rotator cuff tear     bilat    Shoulder pain, bilateral     Type II or unspecified type diabetes mellitus without mention of complication, not stated as uncontrolled      Past Surgical History:   Procedure Laterality Date    CARDIAC CATHETERIZATION  05/17/2018    LAD PROX 50% / MID DIFFUSE 45-50% / LCX PROX 50% WITH PATENT RCA STENT    CARDIAC CATHETERIZATION  2011    COLONOSCOPY      COLONOSCOPY  10/30/2020    COLONOSCOPY N/A 10/30/2020    COLORECTAL CANCER SCREENING, NOT HIGH RISK performed by Lorie Nieves MD at [address]  04/23/2009    Mid RCA (Liberte 3.5 x 32) Proximal RCA (4x20)    CORONARY ANGIOPLASTY WITH STENT PLACEMENT  04/25/2022    DR Espinal Due  /    PACEMAKER INSERTION  07/12/2019    Dr. Jona Padilla  /  Bill Sanchez     Family History   Problem Relation Age of Onset    Diabetes Other     Heart Disease Other     High Blood Pressure Other     Cancer Other     COPD Other     Arthritis Other      Outpatient Medications Marked as Taking for the 1/23/23 encounter (Office Visit) with Paz Thurston MD   Medication Sig Dispense Refill    acetaminophen (TYLENOL) 650 MG extended release tablet Take 650 mg by mouth every 8 hours as needed      varenicline (CHANTIX) 0.5 MG tablet Take 1 mg by mouth 2 times daily       metoprolol succinate (TOPROL XL) 25 MG extended release tablet Take 25 mg by mouth daily      clopidogrel (PLAVIX) 75 MG tablet Take 1 tablet by mouth daily 30 tablet 3    REPATHA SURECLICK 950 MG/ML SOAJ inject 1 PEN INJECTOR SUBCUTANEOUSLU EVERY 2 WEEKS inject ON THE 1ST AND 15TH OF EVERY MONTH      lisinopril (PRINIVIL;ZESTRIL) 5 MG tablet take 1 tablet by mouth twice a day (Patient taking differently: Take 5 mg by mouth at bedtime) 180 tablet 0    Probiotic Acidophilus (FLORANEX) TABS Take 1 tablet by mouth daily 90 tablet 3    metFORMIN (GLUCOPHAGE) 500 MG tablet take 1 tablet by mouth WITH BREAKFAST then 2 tablets WITH EVENING MEAL (Patient taking differently: Take 500 mg by mouth 2 times daily (with meals)) 270 tablet 1    albuterol sulfate  (90 Base) MCG/ACT inhaler Inhale 2 puffs into the lungs every 6 hours as needed for Shortness of Breath 1 Inhaler 3    nitroGLYCERIN (NITROSTAT) 0.4 MG SL tablet Place 1 tablet under the tongue every 5 minutes as needed for Chest pain 10 tablet 5    tamsulosin (FLOMAX) 0.4 MG capsule take 1 capsule by mouth once daily (Patient taking differently: at bedtime take 1 capsule by mouth once daily) 120 capsule 1    omeprazole (PRILOSEC) 20 MG delayed release capsule take 1 capsule by mouth every morning before breakfast (Patient taking differently: in the morning and at bedtime) 90 capsule 1    ipratropium-albuterol (DUONEB) 0.5-2.5 (3) MG/3ML SOLN nebulizer solution Inhale 3 mLs into the lungs every 4 hours 100 vial 2    albuterol (PROVENTIL) (2.5 MG/3ML) 0.083% nebulizer solution Take 3 mLs by nebulization every 6 hours as needed for Wheezing or Shortness of Breath 120 each 3    apixaban (ELIQUIS) 5 MG TABS tablet Take 1 tablet by mouth 2 times daily 60 tablet 3    Blood Glucose Monitoring Suppl (TRUE METRIX AIR GLUCOSE METER) w/Device KIT 1 kit by Does not apply route 2 times daily 1 kit 0    blood glucose test strips (ASCENSIA AUTODISC VI;ONE TOUCH ULTRA TEST VI) strip 1 each by Other route 2 times daily As needed.  100 each 2    ranolazine (RANEXA) 500 MG extended release tablet Take 500 mg by mouth 2 times daily      Blood Pressure Monitoring (BLOOD PRESSURE CUFF) MISC 1 each by Does not apply route daily as needed (monitoring) 1 each 0    Glucose Blood (BLOOD GLUCOSE TEST STRIPS) STRP Testing qd -bid prn 100 strip 11        Symbicort [budesonide-formoterol fumarate], Barium-containing compounds, Dye  [iodides], Pravastatin, and Statins depletion therapy  Social History     Tobacco Use   Smoking Status Former    Packs/day: 0.50    Years: 54.00    Pack years: 27.00    Types: Cigarettes    Quit date:     Years since quittin.0   Smokeless Tobacco Never   Tobacco Comments    pt previously smoked 3 ppd, started at 11 y/o      (If patient a smoker, smoking cessation counseling offered)   Social History     Substance and Sexual Activity   Alcohol Use Yes    Comment: not daily       REVIEW OF SYSTEMS:  Review of Systems    Physical Exam:    This a 67 y.o. male   Vitals:    23 0855   BP: 135/72   Pulse: 69   Resp: 16   Temp: 97.8 °F (36.6 °C)     Body mass index is 34.29 kg/m². Physical Exam  Constitutional: Patient in no acute distress. Neuro: Alert and oriented to person, place and time. Psych: Mood normal, affect normal  Skin: No rash noted  HEENT: Head: Normocephalic and atraumatic  Conjunctivae and EOM are normal. Pupils are equal, round  Nose: Normal  Right External Ear: Normal; Left External Ear: Normal  Mouth: Mucosa Moist  Neck: Supple  Lungs:Respiratory effort is normal  Cardiovascular: Warm & Pink  Abdomen: Soft, non-tender, non-distendedwith no CVA,  No flank tenderness,  Orhepatosplenomegaly   Lymphatics: No palpable lymphadenopathy. Bladder non-tender and not distended. Musculoskeletal: Normal gait and station  Penis normal and circumcised  Urethral meatus normal  Scrotal exam normal  Testicles normal bilaterally  Epididymis normal bilaterally        Assessment and Plan      1. Dysuria    2. BPH with obstruction/lower urinary tract symptoms    3. Weak urinary stream    4. Nocturia    5.  Prostate cancer screening           Plan:  Cont tamsulosin  Cysto for bph  May need pvp vs urolift  Urine culuture         Prescriptions Ordered:  No orders of the defined types were placed in this encounter. Orders Placed:  Orders Placed This Encounter   Procedures    Culture, Urine     Standing Status:   Future     Standing Expiration Date:   1/23/2024     Order Specific Question:   Specify (ex-cath, midstream, cysto, etc)? Answer:   midstream    PSA, Diagnostic     Standing Status:   Future     Standing Expiration Date:   1/18/2024    POCT Urine Dipstick             Nadia Jiménez MD    Agree with the ROS entered by the MA.

## 2023-01-24 LAB
CULTURE: NORMAL
SPECIMEN DESCRIPTION: NORMAL

## 2023-02-01 ENCOUNTER — TELEPHONE (OUTPATIENT)
Dept: UROLOGY | Age: 73
End: 2023-02-01

## 2023-02-01 NOTE — TELEPHONE ENCOUNTER
Patient called in with questions about procedure on the 20th with Georgette Seat. All questions were answered.

## 2023-02-10 ENCOUNTER — HOSPITAL ENCOUNTER (OUTPATIENT)
Age: 73
Setting detail: SPECIMEN
Discharge: HOME | End: 2023-02-10

## 2023-02-10 DIAGNOSIS — Z12.5 PROSTATE CANCER SCREENING: ICD-10-CM

## 2023-02-10 LAB — PROSTATE SPECIFIC ANTIGEN: 5.94 NG/ML

## 2023-02-16 ENCOUNTER — HOSPITAL ENCOUNTER (OUTPATIENT)
Dept: CARDIAC CATH/INVASIVE PROCEDURES | Age: 73
Discharge: HOME | End: 2023-02-16
Payer: MEDICARE

## 2023-02-16 VITALS
WEIGHT: 228 LBS | DIASTOLIC BLOOD PRESSURE: 68 MMHG | HEART RATE: 60 BPM | RESPIRATION RATE: 17 BRPM | HEIGHT: 70 IN | OXYGEN SATURATION: 95 % | SYSTOLIC BLOOD PRESSURE: 116 MMHG | TEMPERATURE: 97.4 F | BODY MASS INDEX: 32.64 KG/M2

## 2023-02-16 LAB
EGFR, POC: >60 ML/MIN/1.73M2
GLUCOSE BLD-MCNC: 128 MG/DL (ref 74–100)
PLATELET # BLD AUTO: 87 K/UL (ref 138–453)
POC BUN: 13 MG/DL (ref 8–26)
POC CHLORIDE: 106 MMOL/L (ref 98–107)
POC CREATININE: 0.77 MG/DL (ref 0.51–1.19)
POC HEMATOCRIT: 46 % (ref 41–53)
POC HEMOGLOBIN: 15.6 G/DL (ref 13.5–17.5)
POC POTASSIUM: 4.2 MMOL/L (ref 3.5–4.5)
POC SODIUM: 140 MMOL/L (ref 138–146)

## 2023-02-16 PROCEDURE — C1887 CATHETER, GUIDING: HCPCS

## 2023-02-16 PROCEDURE — 93005 ELECTROCARDIOGRAM TRACING: CPT | Performed by: INTERNAL MEDICINE

## 2023-02-16 PROCEDURE — 2500000003 HC RX 250 WO HCPCS

## 2023-02-16 PROCEDURE — C1874 STENT, COATED/COV W/DEL SYS: HCPCS

## 2023-02-16 PROCEDURE — 6360000004 HC RX CONTRAST MEDICATION

## 2023-02-16 PROCEDURE — 84520 ASSAY OF UREA NITROGEN: CPT

## 2023-02-16 PROCEDURE — 2709999900 HC NON-CHARGEABLE SUPPLY

## 2023-02-16 PROCEDURE — 93458 L HRT ARTERY/VENTRICLE ANGIO: CPT

## 2023-02-16 PROCEDURE — 7100000010 HC PHASE II RECOVERY - FIRST 15 MIN

## 2023-02-16 PROCEDURE — 82435 ASSAY OF BLOOD CHLORIDE: CPT

## 2023-02-16 PROCEDURE — 85014 HEMATOCRIT: CPT

## 2023-02-16 PROCEDURE — C9600 PERC DRUG-EL COR STENT SING: HCPCS

## 2023-02-16 PROCEDURE — C1769 GUIDE WIRE: HCPCS

## 2023-02-16 PROCEDURE — 99153 MOD SED SAME PHYS/QHP EA: CPT

## 2023-02-16 PROCEDURE — 82565 ASSAY OF CREATININE: CPT

## 2023-02-16 PROCEDURE — 82947 ASSAY GLUCOSE BLOOD QUANT: CPT

## 2023-02-16 PROCEDURE — 85049 AUTOMATED PLATELET COUNT: CPT

## 2023-02-16 PROCEDURE — 6360000002 HC RX W HCPCS

## 2023-02-16 PROCEDURE — 7100000011 HC PHASE II RECOVERY - ADDTL 15 MIN

## 2023-02-16 PROCEDURE — 84295 ASSAY OF SERUM SODIUM: CPT

## 2023-02-16 PROCEDURE — C1725 CATH, TRANSLUMIN NON-LASER: HCPCS

## 2023-02-16 PROCEDURE — 84132 ASSAY OF SERUM POTASSIUM: CPT

## 2023-02-16 PROCEDURE — 99152 MOD SED SAME PHYS/QHP 5/>YRS: CPT

## 2023-02-16 PROCEDURE — C1894 INTRO/SHEATH, NON-LASER: HCPCS

## 2023-02-16 RX ORDER — SODIUM CHLORIDE 0.9 % (FLUSH) 0.9 %
5-40 SYRINGE (ML) INJECTION EVERY 12 HOURS SCHEDULED
Status: DISCONTINUED | OUTPATIENT
Start: 2023-02-16 | End: 2023-02-17 | Stop reason: HOSPADM

## 2023-02-16 RX ORDER — FENTANYL CITRATE 50 UG/ML
25 INJECTION, SOLUTION INTRAMUSCULAR; INTRAVENOUS ONCE
Status: COMPLETED | OUTPATIENT
Start: 2023-02-16 | End: 2023-02-16

## 2023-02-16 RX ORDER — SODIUM CHLORIDE 9 MG/ML
INJECTION, SOLUTION INTRAVENOUS PRN
Status: DISCONTINUED | OUTPATIENT
Start: 2023-02-16 | End: 2023-02-17 | Stop reason: HOSPADM

## 2023-02-16 RX ORDER — ACETAMINOPHEN 325 MG/1
650 TABLET ORAL EVERY 4 HOURS PRN
Status: DISCONTINUED | OUTPATIENT
Start: 2023-02-16 | End: 2023-02-17 | Stop reason: HOSPADM

## 2023-02-16 RX ORDER — NITROGLYCERIN 0.4 MG/1
0.4 TABLET SUBLINGUAL ONCE
Status: COMPLETED | OUTPATIENT
Start: 2023-02-16 | End: 2023-02-16

## 2023-02-16 RX ORDER — SODIUM CHLORIDE 0.9 % (FLUSH) 0.9 %
5-40 SYRINGE (ML) INJECTION PRN
Status: DISCONTINUED | OUTPATIENT
Start: 2023-02-16 | End: 2023-02-17 | Stop reason: HOSPADM

## 2023-02-16 RX ORDER — SODIUM CHLORIDE 9 MG/ML
INJECTION, SOLUTION INTRAVENOUS CONTINUOUS
Status: DISCONTINUED | OUTPATIENT
Start: 2023-02-16 | End: 2023-02-17 | Stop reason: HOSPADM

## 2023-02-16 RX ORDER — FLUTICASONE PROPIONATE AND SALMETEROL 250; 50 UG/1; UG/1
1 POWDER RESPIRATORY (INHALATION) EVERY 12 HOURS
COMMUNITY

## 2023-02-16 RX ORDER — DIPHENHYDRAMINE HYDROCHLORIDE 50 MG/ML
50 INJECTION INTRAMUSCULAR; INTRAVENOUS ONCE
Status: COMPLETED | OUTPATIENT
Start: 2023-02-16 | End: 2023-02-16

## 2023-02-16 RX ADMIN — DIPHENHYDRAMINE HYDROCHLORIDE 50 MG: 50 INJECTION INTRAMUSCULAR; INTRAVENOUS at 08:52

## 2023-02-16 RX ADMIN — SODIUM CHLORIDE: 9 INJECTION, SOLUTION INTRAVENOUS at 08:13

## 2023-02-16 RX ADMIN — NITROGLYCERIN 0.4 MG: 0.4 TABLET SUBLINGUAL at 10:05

## 2023-02-16 RX ADMIN — FENTANYL CITRATE 25 MCG: 50 INJECTION, SOLUTION INTRAMUSCULAR; INTRAVENOUS at 10:27

## 2023-02-16 ASSESSMENT — PAIN SCALES - GENERAL
PAINLEVEL_OUTOF10: 5
PAINLEVEL_OUTOF10: 5

## 2023-02-16 ASSESSMENT — PAIN DESCRIPTION - ORIENTATION
ORIENTATION: MID
ORIENTATION: MID

## 2023-02-16 ASSESSMENT — PAIN DESCRIPTION - LOCATION
LOCATION: CHEST;THROAT
LOCATION: CHEST;THROAT

## 2023-02-16 ASSESSMENT — PAIN DESCRIPTION - DESCRIPTORS
DESCRIPTORS: ACHING
DESCRIPTORS: ACHING

## 2023-02-16 NOTE — OP NOTE
[facility]    CARDIAC CATHETERIZATION    Date:   2/16/2023  Patient name:  Filippo Murguia  Date of admission:  2/16/2023  7:27 AM  MRN:   [id number]  YOB: 1950    Operators:  Primary:   Alexandrea Santamaria MD (Attending Physician)    Assistant/CV fellow:  Gracie Nails MD      Procedure performed:       [x] Left Heart Catheterization. [] Graft Angiography. [x] Left Ventriculography. [] Right Heart Catheterization. [x] Coronary Angiography. [] Aortic Valve Studies. [x] PCI:      [] Other:       Pre Procedure Conscious Sedation Data:  ASA Class:    [] I [x] II [] III [] IV    Mallampati Class:  [] I [x] II [] III [] IV      Indication:  [] STEMI      [] + Stress test  [] ACS      [] + EKG Changes  [] Non Q MI       [] Significant Risk Factors  [x] Recurrent Angina             [] Diabetes Mellitus    [] New LBBB      [] Uncontrolled HTN. [] CHF / Low EF changes     [] Abnormal CTA / Ca Score      Procedure:  Access:  [] Femoral  [x] Radial  artery       [x] Right  [] Left    Procedure: After informed consent was obtained with explanation of the risks and benefits, patient was brought to the cath lab. The access area was prepped and draped in sterile fashion. 1% lidocaine was used for local block. The artery was cannulated with 6  Fr sheath with brisk arterial blood return. The side port was frequently flushed and aspirated with normal saline. Findings:    LMCA: Normal 0% stenosis. LAD: Diffuse irregularities 30-40%. Lesion on 1st Diag: Mid subsection. 80% stenosis. Comments:Small vessel       LCx: Diffuse irregularities 30-40%. RCA: Single stenosis. In-stent re-stenosis. Lesion on Mid RCA: 75% stenosis 30 mm length reduced to 0%. Pre procedure     SUJATHA III flow was noted. Post Procedure SUJATHA III flow was present. Good     runoff was present. The lesion was diagnosed as Moderate Risk (B). Devices used         - Luge Wire 182 cm.  Number of passes: 1.         - Euphora Balloon 3.0mm x 20mm. 2 inflation(s) to a max pressure of: 14     anusha. - Christopher Artesian 3.5 x 34mm. 1 inflation(s) to a max pressure of: 18     anusha. - NC Euphora Balloon 3.5mm x 20mm. 2 inflation(s) to a max pressure of:     22 anusha. Coronary Tree        Dominance: Right       LV Analysis   LV function assessed as:Normal.   Ejection Fraction   +----------------------------------------------------------------------+---+   ! Method                                                                ! EF%! +----------------------------------------------------------------------+---+   ! LV gram                                                               !50 !   +----------------------------------------------------------------------+---+     Estimated Blood Loss: Less than 25 mL    Conclusions:  In-stent re-stenosis mid RCA underwent BERNADINE. Non-obstructive LAD and LCX disease unchanged from 2018   Normal LV systolic function. Recommendations:    Routine Post Stent Orders. Medical therapy as needed. Risk factor modification. ____________________________________________________________________    History and Risk Factors    [x] Hypertension     [] Family history of CAD  [x] Hyperlipidemia     [] Cerebrovascular Disease   [x] Prior MI       [] Peripheral Vascular disease   [x] Prior PCI              [] Diabetes Mellitus    [] Left Main PCI. [] Currently on Dialysis. [] Prior CABG. [] Currently smoker. [] Cardiac Arrest outside of healthcare facility. [] Yes    [x] No        Witnessed     [] Yes   [] No     Arrest after arrival of EMS  [] Yes   [] No     [] Cardiac Arrest at other Facility. [] Yes   [x] No    Pre-Procedure Information. Heart Failure       [] Yes    [x] No        Class  [] I      [] II  [] III    [] IV. New Diagnosis    [] Yes  [] No    HF Type      [] Systolic   [] Diastolic          [] Unknown.     Diagnostic Test:   EKG [] Normal   [] Abnormal    New antiarrhythmia medications:    [] Yes   [] No   New onset atrial fibrillation / Flutter     [] Yes   [] No   ECG Abnormalities:      [] V. Fib   [] Linda V. Tach           [] NS V. T   [] New LBBB           [] T. Inv  []  ST dev > 0.5 mm         [] PVC's freq  [] PVC's infrequent    Stress Test Performed:      [] Yes    [] No     Type:     [] Stress Echo   [] Exercise Stress Test (no imaging)      [] Stress Nuclear  [] Stress Imaging     Results   [] Negative   [] Positive        [] Indeterminate  [] Unavailable     If Positive/ Risk / Extent of Ischemia:       [] Low  [] Intermediate         [] High  [] Unavailable      Cardiac CTA Performed:     [] Yes    [x] No      Results   [] CAD   [] Non obstructive CAD      [] No CAD   [] Uncertain      [] Unknown   [] Structural Disease. Pre Procedure Medications:   [x] Yes    [] No         [x] Eliquis   [] Beta Blockers      [] Nitrate   [] Ca Channel Blockers      [] Ranolazine   [x] Statin       [x] Plavix/Others antiplatelets    Sonal Luevano MD       Cardiovascular Fellow      Physician Statement  I have discussed the case of Matthew Stoner including pertinent history and exam findings with the resident. I have seen and examined the patient and the key elements of the encounter have been performed by me. I agree with the assessment, plan and orders as documented by the resident With changes made to the note. Procedure performed by me.     Electronically signed by Ary Gregory MD on 2/16/2023 at 3:47 PM.    [facility]      [phone]

## 2023-02-16 NOTE — PROGRESS NOTES
Received post procedure to [facility] to room 5. Assessment obtained. Restrictions reviewed with patient. Post procedure pathway initiated. Rt wrist site soft ,  vasc band dry and intact. No hematoma noted. Patient without complaints.  Angiomax infusing

## 2023-02-16 NOTE — PROGRESS NOTES
All discharge instructions reviewed, questions answered, paper signed and given copy. Patient discharged per wheelchair with friend and belongings. Stent card and booklet given to patient. Aware of need to make own appointment 2 weeks for follow up.

## 2023-02-16 NOTE — H&P
[facility]  Procedure History and Physical Update          Patient Name: Italo Kellogg  MRN:    [id number]  YOB: 1950  Date of evaluation:  2/16/2023    Procedure:    Cardiac cath +/- PCI    Indication for procedure:     Chest pain      Please refer to the office note completed by Dr. Lexy Ayala on 01/31/2023 in the medical record and note that:    [x] I have examined the patient and reviewed the H&P/Consult and there are no changes to be made to the assessment or plan.     [] I have examined the patient and reviewed the H&P/Consult and have noted the following changes:    Past Medical History:   Diagnosis Date    Abnormal cardiovascular stress test     Anxiety disorder 08/31/2020    Atrial flutter ([address] 05/17/2018    ELIQUIS    CAD (coronary artery disease)     Callus of foot     rt     COPD (chronic obstructive pulmonary disease) (HCC)     Former smoker     GERD (gastroesophageal reflux disease)     H/O cardiovascular stress test 09/2017    H/O non-A, non-B viral hepatitis     Hyperlipidemia     Hypertension     Marijuana use     MI (myocardial infarction) ([facility] [address]     Obesity     Osteoarthritis     Pneumonia     Poor compliance     Rotator cuff tear     bilat    Shoulder pain, bilateral     Type II or unspecified type diabetes mellitus without mention of complication, not stated as uncontrolled        Past Surgical History:   Procedure Laterality Date    CARDIAC CATHETERIZATION  05/17/2018    LAD PROX 50% / MID DIFFUSE 45-50% / LCX PROX 50% WITH PATENT RCA STENT    CARDIAC CATHETERIZATION  2011    CARDIAC CATHETERIZATION  02/16/2023    COLONOSCOPY      COLONOSCOPY  10/30/2020    COLONOSCOPY N/A 10/30/2020    COLORECTAL CANCER SCREENING, NOT HIGH RISK performed by Luz Fonseca MD at [address]  04/23/2009    Mid RCA (Liberte 3.5 x 32) Proximal RCA (4x20)    CORONARY ANGIOPLASTY WITH STENT PLACEMENT  04/25/2022    DR Socrates Farris  / Successful PTCA-BERNADINE to mid RCA. / 40% proximal Mid LAD stenosis (same as 2019). PACEMAKER INSERTION  07/12/2019    Dr. Minal Anguiano  /  Jimmy Ballard       Family History   Problem Relation Age of Onset    Cancer Mother     Cancer Father     Diabetes Other     Heart Disease Other     High Blood Pressure Other     Cancer Other     COPD Other     Arthritis Other        Allergies   Allergen Reactions    Symbicort [Budesonide-Formoterol Fumarate] Swelling     Patient reports throat swelling. Barium-Containing Compounds      \"had a reaction to a test, I think it was barium\"    Dye  [Iodides]      Other reaction(s): Wheezing  Red in color, hot, wheezing    Pravastatin      Statins cause severe muscle aches. Statins Depletion Therapy        Prior to Admission medications    Medication Sig Start Date End Date Taking?  Authorizing Provider   fluticasone-salmeterol (ADVAIR) 250-50 MCG/ACT AEPB diskus inhaler Inhale 1 puff into the lungs every 12 hours   Yes Historical Provider, MD   isosorbide mononitrate (IMDUR) 30 MG extended release tablet take 1 tablet by mouth once daily 1/8/23   Historical Provider, MD   acetaminophen (TYLENOL) 650 MG extended release tablet Take 650 mg by mouth every 8 hours as needed 3/14/22   Historical Provider, MD   varenicline (CHANTIX) 0.5 MG tablet Take 1 mg by mouth 2 times daily     Historical Provider, MD   metoprolol succinate (TOPROL XL) 25 MG extended release tablet Take 25 mg by mouth at bedtime    Historical Provider, MD   clopidogrel (PLAVIX) 75 MG tablet Take 1 tablet by mouth daily 4/25/22   Isac Collins MD   REPATHA SURECLICK 501 MG/ML SOAJ inject 1 PEN INJECTOR SUBCUTANEOUSLU EVERY 2 WEEKS inject ON THE 1ST AND 15TH OF EVERY MONTH 11/16/21   Historical Provider, MD   lisinopril (PRINIVIL;ZESTRIL) 5 MG tablet take 1 tablet by mouth twice a day  Patient taking differently: Take 5 mg by mouth 2 times daily 11/12/21   CHRISTIANA Zhou - CNP   metFORMIN (GLUCOPHAGE) 500 MG tablet take 1 tablet by mouth WITH BREAKFAST then 2 tablets WITH EVENING MEAL  Patient taking differently: Take 500 mg by mouth 2 times daily (with meals) 7/16/21   Tavon CHRISTIANA Mayorga - CNP   albuterol sulfate  (90 Base) MCG/ACT inhaler Inhale 2 puffs into the lungs every 6 hours as needed for Shortness of Breath 7/16/21   Tavon Fam, CHRISTIANA - CNP   nitroGLYCERIN (NITROSTAT) 0.4 MG SL tablet Place 1 tablet under the tongue every 5 minutes as needed for Chest pain 7/16/21   Tavon Fam, APRN - CNP   tamsulosin [facility]) 0.4 MG capsule take 1 capsule by mouth once daily  Patient taking differently: 2 times daily take 1 capsule by mouth once daily 7/8/21   Tavon FamCHRISTIANA - CNP   omeprazole (PRILOSEC) 20 MG delayed release capsule take 1 capsule by mouth every morning before breakfast  Patient taking differently: in the morning and at bedtime 6/28/21   Tavon FamCHRISTIANA - CNP   ipratropium-albuterol (DUONEB) 0.5-2.5 (3) MG/3ML SOLN nebulizer solution Inhale 3 mLs into the lungs every 4 hours 3/3/21   Tavon CHRISTIANA Mayorga - CNP   lisinopril (PRINIVIL;ZESTRIL) 5 MG tablet take 1 tablet by mouth twice a day 1/19/21   Tavon Fam, CHRISTIANA - CNP   tamsulosin [facility]) 0.4 MG capsule take 1 capsule by mouth once daily 11/4/20   Tavon CHRISTIANA Mayorga CNP   apixaban (ELIQUIS) 5 MG TABS tablet Take 1 tablet by mouth 2 times daily 12/12/19   CHRISTIANA Zambrano CNP   Blood Glucose Monitoring Suppl (TRUE METRIX AIR GLUCOSE METER) w/Device KIT 1 kit by Does not apply route 2 times daily 8/28/19   CHRISTIANA Zambrano CNP   blood glucose test strips (ASCENSIA AUTODISC VI;ONE TOUCH ULTRA TEST VI) strip 1 each by Other route 2 times daily As needed.  8/28/19   CHRISTIANA Zambrano CNP   ranolazine (RANEXA) 500 MG extended release tablet Take 500 mg by mouth 2 times daily    Historical Provider, MD   Blood Pressure Monitoring (BLOOD PRESSURE CUFF) MISC 1 each by Does not apply route daily as needed (monitoring) 2/20/19   Marjorie Hilton CHRISTIANA Cannon - CNP   Glucose Blood (BLOOD GLUCOSE TEST STRIPS) STRP Testing qd -bid prn 5/15/13   Arcadia Humberto DO [place]         Vitals:    02/16/23 0800   BP: 110/66   Pulse: 61   Resp: 12   Temp: 97.4 °F (36.3 °C)   SpO2: 96%       Constitutional and General Appearance:   alert, cooperative, no distress and appears stated age  HEENT:  PERRL, EOMI  Respiratory:  Normal excursion and expansion without use of accessory muscles  Resp Auscultation:  Good respiratory effort. No for increased work of breathing. On auscultation: clear to auscultation bilaterally  Cardiovascular:  Regular rate and rhythm. S1/S2  No murmurs. The apical impulse is not displaced  Abdomen:  Soft  Bowel sounds present  Non-tender to palpation  Extremities:  No cyanosis or clubbing  Lower extremity edema:   Skin:  Warm and dry  Neurological:  Alert and oriented. Moves all extremities well        1. Recurrent anginal chest pain. 2. Cath 4/25/22- LM normal, LAD prox-mid 40% (same as prior), LCx mid 20-30%, RCA mid instent restenosis 99% (PTCA/BERNADINE), LVEF 55%  3. Aflutter rate controlled - Eliquis & Lopressor  4. SSS Medtronic dual chamber PPM on 7/2019.  5. Statin allergy. 8. Carotid on 1/21- 16-95% EVY, < 78% LICA  10. VT on pacer check 9/22  11. Echo 9/22- EF 55%, Trivial AI/MR    Plan:  Proceed with Left heart catheterization. Further orders to follow. Risks, benefits, and alternatives of cardiac catheterization were discussed, in detail, with patient. Risks include, but not limited to, bleeding, requiring blood transfusion, vascular complication requiring surgery, renal failure with need of dialysis, CVA, MI, death and anesthesia complications including intubation were discussed. Patient verbalized understanding and agreed to proceed with the procedure understanding the above risks and alternatives to the procedure.     Shaheen Roach MD       Cardiovascular Fellow      Attending Physician Statement  I have discussed the case of Riri Jaeger Betito Samples including pertinent history and exam findings with the resident. I have seen and examined the patient and the key elements of the encounter have been performed by me. I agree with the assessment, plan and orders as documented by the resident With changes made to the note.      Electronically signed by Gali Cagle MD on 2/16/2023 at 3:33 PM.    [facility]      [phone]

## 2023-02-16 NOTE — PROGRESS NOTES
Angiomax gtt completed. Chest and throat still feeling \"uncomfortable\". ... Chantale Stands \"like heartburn\". ...states heaviness to chest feels better. Patient overall states difficult to describe pain but states \"just not feeling good. \" Medicated fentanyl 25 mcg IV given as ordered

## 2023-02-16 NOTE — PROGRESS NOTES
Patient complains of throat and upper chest pains / dull and nonradiating. Skin warm and dry. Oxygen on 2 liters without distress. States unsure if some. Rosetta Feeler". .EKG completed  at bedside and Dr Ella Uribe out to assess. One sl nitro given and pain to chest becoming more dull. To rate pain to chest/throat states \"4\" at moment.   Writer at bedside

## 2023-02-16 NOTE — DISCHARGE INSTRUCTIONS
DISCHARGE INSTRUCTIONS / ARM CARE POST CATHERIZATION        ENCOURAGE FLUIDS    NO STRENUOUS LIFTING WITH AFFECTED ARM FOR 3 DAYS ANYTHING HEAVIER THAN 8 TO 10 POUNDS    REMOVE BAND-AID/PRESSURE DRESSING THE FOLLOWING DAY AND DO NOT APPLY ANY FURTHER BAND-AIDS    KEEP INCISION CLEAN DRY AND OPEN TO THE AIR / NO HAND LOTION NEAR PUNCTURE SITE    WATCH FOR SIGNS OF INFECTION /  REDNESS / SWELLING / DRAINAGE / [place] / TEMPERATURE GREATER THAN 101    IF BLEEDING OCCURS HOLD MANUAL PRESSURE DIRECTLY OVER SITE  (YOU WILL FEEL PULSATION OF ARTERY) AND IF BLEEDING DOES NOT STOP AFTER 2 MINUTES CALL 911    OK TO SHOWER THE NEXT DAY, NO TUB BATHING OR HOT TUBS/SWIMMING FOR 7 DAYS    IF AREA BECOMES HARD AND SWOLLEN AND IF YOU ARE AT ALL CONCERNED SEEK HELP IMMEDIATELY    SEEK HELP IMMEDIATELY IF AFFECTED ARM BECOMES COLD / Darlene Crease / SEVERE PAIN / NAILBEDS TURN BLUE     IF ON METFORMIN / GLUCOPHAGE DO NOT RESTART MEDICATION FOR 48 HOURS    PLEASE PRACTICE GOOD HAND WASHING AND INCLUDE PUNCTURE SITE ESPECIALLY AFTER USING THE RESTROOM    AVOID USING ALCOHOL BASED HAND SANITIZERS FOR ONE WEEK      CALL 911 if you have symptoms including:   Drooping facial muscles   Changes in vision or speech   Difficulty walking or using your limbs   Change in sensation to affected leg, including numbness, feeling cold, or change in color   Extreme sweating, nausea or vomiting   Dizziness or lightheadedness   Chest pain   Rapid, irregular heartbeat   Palpitations   Cough, shortness of breath, or difficulty breathing   Weakness or fainting   If you think you have an emergency, CALL 911 . SEDATION / ANALGESIA INFORMATION / Bib Griffith 85 have received the sedation/analgesia medication during your visit    Sedation/analgesia is used during short medical procedures under controlled supervision. The medication will produce a strong relaxation.  You will be able to hear, speak and follow instructions, but your memory and alertness will be decreased. You will be able to swallow and breathe on your own. During sedation/analgesia your blood pressure, heart and breathing will be watched closely. After the procedure, you may not remember what was said or done. You may have the following effects from the medication. \" Drowsiness, dizziness, sleepiness or confusion. \" Difficulty remembering or delayed reaction times. \" Loss of fine muscle control or difficulty with your balance especially while walking. \" Difficulty focusing or blurred vision. You may not be aware of slight changes in your behavior and/or your reaction time because of the medication used during the procedure. Therefore you should follow these instructions. \" Have someone responsible help you with your care. \" Do not drive for 24 hours. \" Do not operate equipment for 24 hours (lawnmowers, power tools, kitchen accessories, stove). \" Do not drink any alcoholic beverages for a minimum of 24 hours. \" Do not make important personal, legal or business decisions for 24 hours. \" You may experience dizziness or lightheadedness. Move slowly and carefully, do not make sudden position changes. \" Drink extra amounts of fluids today. \" Increase your diet as tolerated (unless you have received specific instructions from your doctor). \" If you feel nauseated, continue with liquids until the nausea is gone. \" Notify your physician if you have not urinated within 8 hours after the procedure. \" Resume your medications unless otherwise instructed. Coronary artery disease (CAD) occurs when plaque builds up in the arteries that bring oxygen-rich blood to your heart. Plaque is a fatty substance made of cholesterol, calcium, and other substances in the blood. This process is called hardening of the arteries, or atherosclerosis. What happens when you have coronary artery disease? Plaque may narrow the coronary arteries. Narrowed arteries cause poor blood flow.  This can lead to angina symptoms such as chest pain or discomfort. If blood flow is completely blocked, you could have a heart attack. You can slow CAD and reduce the risk of future problems by making changes in your lifestyle. These include quitting smoking and eating heart-healthy foods. Treatments for CAD, along with changes in your lifestyle, can help you live a longer and healthier life. How can you prevent coronary artery disease? Do not smoke. It may be the best thing you can do to prevent heart disease. If you need help quitting, talk to your doctor about stop-smoking programs and medicines. These can increase your chances of quitting for good. Be active. Get at least 30 minutes of exercise on most days of the week. Walking is a good choice. You also may want to do other activities, such as running, swimming, cycling, or playing tennis or team sports. Eat heart-healthy foods. Eat more fruits and vegetables and less foods that contain saturated and trans fats. Limit alcohol, sodium, and sweets. Stay at a healthy weight. Lose weight if you need to. Manage other health problems such as diabetes, high blood pressure, and high cholesterol. Talk to your doctor about taking a daily aspirin. Manage stress. Stress can hurt your heart. To keep stress low, talk about your problems and feelings. Don't keep your feelings hidden. How is coronary artery disease treated? Your doctor will suggest that you make lifestyle changes. For example, your doctor may ask you to eat healthy foods, quit smoking, lose extra weight, and be more active. You will have to take medicines. Your doctor may suggest a procedure to open narrowed or blocked arteries. This is called angioplasty. Or your doctor may suggest using healthy blood vessels to create detours around narrowed or blocked arteries. This is called bypass surgery. Follow-up care is a key part of your treatment and safety.  Be sure to make and go to all appointments, and call your doctor if you are having problems. It's also a good idea to know your test results and keep a list of the medicines you take. Where can you learn more? Go to https://doyle.DeckDAQ. org and sign in to your [facility] account. Enter [phone] in the [employer] box to learn more about Learning About Coronary Artery Disease (CAD).     If you do not have an account, please click on the Sign Up Now link.

## 2023-02-16 NOTE — PROGRESS NOTES
Patient ambulates in halls without distress. Right wrist remains clean soft and dry / pressure dressing secure.

## 2023-02-16 NOTE — PROGRESS NOTES
Patient admitted, consent signed and questions answered. Patient ready for procedure. Call light to reach with side rails up 2 of 2. Right wrist and bilat groin hairs clipped with writer and Christiano Morrison present. Family at bedside with patient. History and physical needed.

## 2023-02-16 NOTE — PROGRESS NOTES
VASC BAND removed and site viewed by patient and wound care instructions initiated with patient. Pressure dressing applied. Transportation arrangements made by patient.   Dr Kraig Howell updated in that patient has been pain free and states ok for discharge

## 2023-02-16 NOTE — PROGRESS NOTES
Dr Anish Jara in to see / review EKG. Patient states chest pain gone and feeling sleepy. Throat ache almost gone. Side rails up with call light to reach. Continue to monitor.   Right wrist remains clean and dry / vasc band secure

## 2023-02-16 NOTE — PROGRESS NOTES
Awakens and states feeling much better. No complaints of throat or chest pain. Right wrist remains clean soft and dry. Call light to reach.

## 2023-02-17 LAB
EKG ATRIAL RATE: 60 BPM
EKG Q-T INTERVAL: 438 MS
EKG QRS DURATION: 92 MS
EKG QTC CALCULATION (BAZETT): 438 MS
EKG R AXIS: -27 DEGREES
EKG T AXIS: -60 DEGREES
EKG VENTRICULAR RATE: 60 BPM

## 2023-02-20 ENCOUNTER — PROCEDURE VISIT (OUTPATIENT)
Dept: UROLOGY | Age: 73
End: 2023-02-20
Payer: MEDICARE

## 2023-02-20 VITALS — WEIGHT: 228 LBS | HEIGHT: 70 IN | BODY MASS INDEX: 32.64 KG/M2

## 2023-02-20 DIAGNOSIS — N13.8 BPH WITH OBSTRUCTION/LOWER URINARY TRACT SYMPTOMS: Primary | ICD-10-CM

## 2023-02-20 DIAGNOSIS — N40.1 BPH WITH OBSTRUCTION/LOWER URINARY TRACT SYMPTOMS: Primary | ICD-10-CM

## 2023-02-20 PROCEDURE — 52000 CYSTOURETHROSCOPY: CPT | Performed by: UROLOGY

## 2023-02-20 NOTE — PROGRESS NOTES
Cystoscopy Operative Note (2/20/23)  Surgeon: Vivien Villarreal MD  Anesthesia: Urethral 2% Xylocaine   Indications: BPH  Position: Dorsal Lithotomy    Findings:   Risks and Benefits discussed with patient prior to procedure. The patient was prepped and draped in the usual sterile fashion. The flexible cystoscope was advanced through the urethra and into the bladder. The bladder was thoroughly inspected and the following was noted:    Residual Urine: mild  Urethra: normal appearing urethra with no masses, tenderness or lesions  Prostate: completely obstructing lateral lobes of prostate; median lobe present? yes. Bladder: No tumors or CIS noted. No bladder diverticulum. There was moderate trabeculation noted. Ureters: Clear efflux from both ureters. Orifices with normal configuration and location. The cystoscope was removed. The patient tolerated the procedure well. Agree with the ROS entered by the MA.     Plan: greenlight xps

## 2023-02-22 ENCOUNTER — HOSPITAL ENCOUNTER (EMERGENCY)
Age: 73
Discharge: HOME | End: 2023-02-22
Attending: EMERGENCY MEDICINE
Payer: MEDICARE

## 2023-02-22 VITALS
WEIGHT: 235 LBS | RESPIRATION RATE: 20 BRPM | HEIGHT: 70 IN | OXYGEN SATURATION: 95 % | SYSTOLIC BLOOD PRESSURE: 122 MMHG | TEMPERATURE: 98 F | DIASTOLIC BLOOD PRESSURE: 79 MMHG | BODY MASS INDEX: 33.64 KG/M2 | HEART RATE: 86 BPM

## 2023-02-22 DIAGNOSIS — N39.0 URINARY TRACT INFECTION WITHOUT HEMATURIA, SITE UNSPECIFIED: Primary | ICD-10-CM

## 2023-02-22 LAB
ABSOLUTE EOS #: 0.2 K/UL (ref 0–0.4)
ABSOLUTE LYMPH #: 2.4 K/UL (ref 1–4.8)
ABSOLUTE MONO #: 1.7 K/UL (ref 0.1–1.3)
ANION GAP SERPL CALCULATED.3IONS-SCNC: 11 MMOL/L (ref 9–17)
BACTERIA: ABNORMAL
BASOPHILS # BLD: 0 % (ref 0–2)
BASOPHILS ABSOLUTE: 0 K/UL (ref 0–0.2)
BILIRUBIN URINE: ABNORMAL
BUN SERPL-MCNC: 11 MG/DL (ref 8–23)
CALCIUM SERPL-MCNC: 9.1 MG/DL (ref 8.6–10.4)
CHLORIDE SERPL-SCNC: 103 MMOL/L (ref 98–107)
CO2 SERPL-SCNC: 20 MMOL/L (ref 20–31)
COLOR: ABNORMAL
CREAT SERPL-MCNC: 0.98 MG/DL (ref 0.7–1.2)
EOSINOPHILS RELATIVE PERCENT: 1 % (ref 0–4)
EPITHELIAL CELLS UA: ABNORMAL /HPF
GFR SERPL CREATININE-BSD FRML MDRD: >60 ML/MIN/1.73M2
GLUCOSE SERPL-MCNC: 140 MG/DL (ref 70–99)
GLUCOSE UR STRIP.AUTO-MCNC: NEGATIVE MG/DL
HCT VFR BLD AUTO: 47 % (ref 41–53)
HGB BLD-MCNC: 15 G/DL (ref 13.5–17.5)
KETONES UR STRIP.AUTO-MCNC: ABNORMAL MG/DL
LEUKOCYTE ESTERASE UR QL STRIP.AUTO: ABNORMAL
LYMPHOCYTES # BLD: 15 % (ref 24–44)
MAGNESIUM SERPL-MCNC: 1.5 MG/DL (ref 1.6–2.6)
MCH RBC QN AUTO: 28.7 PG (ref 26–34)
MCHC RBC AUTO-ENTMCNC: 32 G/DL (ref 31–37)
MCV RBC AUTO: 89.6 FL (ref 80–100)
MONOCYTES # BLD: 11 % (ref 1–7)
MUCUS: ABNORMAL
NITRITE UR QL STRIP.AUTO: POSITIVE
PDW BLD-RTO: 14.8 % (ref 11.5–14.9)
PLATELET # BLD AUTO: 224 K/UL (ref 150–450)
PMV BLD AUTO: 7.2 FL (ref 6–12)
POTASSIUM SERPL-SCNC: 4.3 MMOL/L (ref 3.7–5.3)
PROT UR STRIP.AUTO-MCNC: 5 MG/DL (ref 5–8)
PROT UR STRIP.AUTO-MCNC: ABNORMAL MG/DL
RBC # BLD: 5.24 M/UL (ref 4.5–5.9)
RBC CLUMPS #/AREA URNS AUTO: ABNORMAL /HPF
SEG NEUTROPHILS: 73 % (ref 36–66)
SEGMENTED NEUTROPHILS ABSOLUTE COUNT: 11.8 K/UL (ref 1.3–9.1)
SODIUM SERPL-SCNC: 134 MMOL/L (ref 135–144)
SPECIFIC GRAVITY UA: 1.03 (ref 1–1.03)
TURBIDITY: ABNORMAL
URINE HGB: ABNORMAL
UROBILINOGEN, URINE: NORMAL
WBC # BLD AUTO: 16.1 K/UL (ref 3.5–11)
WBC UA: ABNORMAL /HPF
YEAST: ABNORMAL

## 2023-02-22 PROCEDURE — 36415 COLL VENOUS BLD VENIPUNCTURE: CPT

## 2023-02-22 PROCEDURE — 51798 US URINE CAPACITY MEASURE: CPT

## 2023-02-22 PROCEDURE — 87186 SC STD MICRODIL/AGAR DIL: CPT

## 2023-02-22 PROCEDURE — 87077 CULTURE AEROBIC IDENTIFY: CPT

## 2023-02-22 PROCEDURE — 99283 EMERGENCY DEPT VISIT LOW MDM: CPT

## 2023-02-22 PROCEDURE — 85025 COMPLETE CBC W/AUTO DIFF WBC: CPT

## 2023-02-22 PROCEDURE — 81001 URINALYSIS AUTO W/SCOPE: CPT

## 2023-02-22 PROCEDURE — 80048 BASIC METABOLIC PNL TOTAL CA: CPT

## 2023-02-22 PROCEDURE — 87086 URINE CULTURE/COLONY COUNT: CPT

## 2023-02-22 PROCEDURE — 83735 ASSAY OF MAGNESIUM: CPT

## 2023-02-22 PROCEDURE — 6370000000 HC RX 637 (ALT 250 FOR IP): Performed by: STUDENT IN AN ORGANIZED HEALTH CARE EDUCATION/TRAINING PROGRAM

## 2023-02-22 RX ORDER — CIPROFLOXACIN 500 MG/1
500 TABLET, FILM COATED ORAL ONCE
Status: COMPLETED | OUTPATIENT
Start: 2023-02-22 | End: 2023-02-22

## 2023-02-22 RX ORDER — CIPROFLOXACIN 500 MG/1
500 TABLET, FILM COATED ORAL 2 TIMES DAILY
Qty: 20 TABLET | Refills: 0 | Status: SHIPPED | OUTPATIENT
Start: 2023-02-22 | End: 2023-03-04

## 2023-02-22 RX ADMIN — CIPROFLOXACIN 500 MG: 500 TABLET, FILM COATED ORAL at 21:32

## 2023-02-22 ASSESSMENT — ENCOUNTER SYMPTOMS
NAUSEA: 0
VOMITING: 0
ABDOMINAL PAIN: 1
SHORTNESS OF BREATH: 0
BACK PAIN: 0

## 2023-02-22 ASSESSMENT — PAIN - FUNCTIONAL ASSESSMENT: PAIN_FUNCTIONAL_ASSESSMENT: 0-10

## 2023-02-22 ASSESSMENT — PAIN SCALES - GENERAL: PAINLEVEL_OUTOF10: 9

## 2023-02-22 NOTE — ED TRIAGE NOTES
Mode of arrival (squad #, walk in, police, etc) : walk in        Chief complaint(s): urinary retention        Arrival Note (brief scenario, treatment PTA, etc). : pt states he had a cysto done on the 20th and since then he hasn't been able to empty his bladder. Pt states he has been dribbling. Pt had it due to an enlarged prostate        C= \"Have you ever felt that you should Cut down on your drinking? \"  No  A= \"Have people Annoyed you by criticizing your drinking? \"  No  G= \"Have you ever felt bad or Guilty about your drinking? \"  No  E= \"Have you ever had a drink as an Eye-opener first thing in the morning to steady your nerves or to help a hangover? \"  No      Deferred []      Reason for deferring: N/A    *If yes to two or more: probable alcohol abuse. *

## 2023-02-23 NOTE — ED PROVIDER NOTES
[address] [facility]  Emergency Department Encounter  Emergency Medicine Resident     Pt Brett Barker  MRN: [id number]  [place] 1950  Date of evaluation: 2/22/23  PCP:  CHRISTIANA Reis CNP  Note Started: 7:36 PM EST      CHIEF COMPLAINT       Chief Complaint   Patient presents with    Urinary Retention       HISTORY OF PRESENT ILLNESS  (Location/Symptom, Timing/Onset, Context/Setting, Quality, Duration, Modifying Factors, Severity.)      Tree Feng is a 67 y.o. male who presents with concerns for urinary retention. Patient states he had a cystoscopy performed with urology on Monday of this week for concern for prostate issues. States it was incredibly painful. States they found his prostate to be enlarged and he needs to have surgery. States since then he has been having issues urinating. Denies any dysuria however feels like he is not able to get much out when he is urinating. Urinating much more often than usual.  Having some lower abdominal pain. Denies any fevers or chills. States he had no issues urinating prior to the cystoscopy. PAST MEDICAL / SURGICAL / SOCIAL / FAMILY HISTORY      has a past medical history of Abnormal cardiovascular stress test, Anxiety disorder, Atrial flutter ([address] CAD (coronary artery disease), Callus of foot, COPD (chronic obstructive pulmonary disease) ([address] Former smoker, GERD (gastroesophageal reflux disease), H/O cardiovascular stress test, H/O non-A, non-B viral hepatitis, Hyperlipidemia, Hypertension, Marijuana use, MI (myocardial infarction) ([address] Obesity, Osteoarthritis, Pneumonia, Poor compliance, Rotator cuff tear, Shoulder pain, bilateral, and Type II or unspecified type diabetes mellitus without mention of complication, not stated as uncontrolled. has a past surgical history that includes Coronary angioplasty with stent (04/23/2009); Cardiac catheterization (05/17/2018); Cardiac catheterization (2011);  Pacemaker insertion (2019); Colonoscopy; Colonoscopy (10/30/2020); Colonoscopy (N/A, 10/30/2020); Coronary angioplasty with stent (2022); Cardiac catheterization (2023); and Coronary angioplasty with stent (2023).      Social History     Socioeconomic History    Marital status: Single     Spouse name: Not on file    Number of children: Not on file    Years of education: Not on file    Highest education level: Not on file   Occupational History    Not on file   Tobacco Use    Smoking status: Former     Packs/day: 0.50     Years: 54.00     Pack years: 27.00     Types: Cigarettes     Quit date:      Years since quittin.1    Smokeless tobacco: Never    Tobacco comments:     pt previously smoked 3 ppd, started at 17 y/o   Vaping Use    Vaping Use: Never used   Substance and Sexual Activity    Alcohol use: Not Currently     Comment: not daily    Drug use: Yes     Types: Marijuana (Weed)     Comment: 2- SMOKES NIGHTLY    Sexual activity: Not Currently   Other Topics Concern    Not on file   Social History Narrative    Not on file     Social Determinants of Health     Financial Resource Strain: Not on file   Food Insecurity: Not on file   Transportation Needs: Not on file   Physical Activity: Not on file   Stress: Not on file   Social Connections: Not on file   Intimate Partner Violence: Not on file   Housing Stability: Not on file       Family History   Problem Relation Age of Onset    Cancer Mother     Cancer Father     Diabetes Other     Heart Disease Other     High Blood Pressure Other     Cancer Other     COPD Other     Arthritis Other        Allergies:  Symbicort [budesonide-formoterol fumarate], Barium-containing compounds, Dye  [iodides], Pravastatin, and Statins depletion therapy    Home Medications:  Prior to Admission medications    Medication Sig Start Date End Date Taking? Authorizing Provider   ciprofloxacin (CIPRO) 500 MG tablet Take 1 tablet by mouth 2 times daily for 10 days 23  3/4/23 Yes Kayli Robb,    fluticasone-salmeterol (ADVAIR) 250-50 MCG/ACT AEPB diskus inhaler Inhale 1 puff into the lungs every 12 hours    Historical Provider, MD   isosorbide mononitrate (IMDUR) 30 MG extended release tablet take 1 tablet by mouth once daily 1/8/23   Historical Provider, MD   acetaminophen (TYLENOL) 650 MG extended release tablet Take 650 mg by mouth every 8 hours as needed 3/14/22   Historical Provider, MD   varenicline (CHANTIX) 0.5 MG tablet Take 1 mg by mouth 2 times daily     Historical Provider, MD   metoprolol succinate (TOPROL XL) 25 MG extended release tablet Take 25 mg by mouth at bedtime    Historical Provider, MD   clopidogrel (PLAVIX) 75 MG tablet Take 1 tablet by mouth daily 4/25/22   MD MANISHA Martino SURECLICK 284 MG/ML SOAJ inject 1 PEN INJECTOR SUBCUTANEOUSLU EVERY 2 WEEKS inject ON THE 1ST AND 15TH OF EVERY MONTH 11/16/21   Historical Provider, MD   lisinopril (PRINIVIL;ZESTRIL) 5 MG tablet take 1 tablet by mouth twice a day  Patient taking differently: Take 5 mg by mouth 2 times daily 11/12/21   CHRISTIANA Savage Junior, CNP   metFORMIN (GLUCOPHAGE) 500 MG tablet take 1 tablet by mouth WITH BREAKFAST then 2 tablets WITH EVENING MEAL  Patient taking differently: Take 500 mg by mouth 2 times daily (with meals) 7/16/21   CHRISTIANA Diaz CNP   albuterol sulfate  (90 Base) MCG/ACT inhaler Inhale 2 puffs into the lungs every 6 hours as needed for Shortness of Breath 7/16/21   CHRISTIANA Diaz CNP   nitroGLYCERIN (NITROSTAT) 0.4 MG SL tablet Place 1 tablet under the tongue every 5 minutes as needed for Chest pain 7/16/21   CHRISTIANA Diaz CNP   tamsulosin (FLOMAX) 0.4 MG capsule take 1 capsule by mouth once daily  Patient taking differently: 2 times daily take 1 capsule by mouth once daily 7/8/21   CHRISTIANA Diaz CNP   omeprazole (PRILOSEC) 20 MG delayed release capsule take 1 capsule by mouth every morning before breakfast  Patient taking differently: in the morning and at bedtime 6/28/21   CHRISTIANA Chandler CNP   ipratropium-albuterol (DUONEB) 0.5-2.5 (3) MG/3ML SOLN nebulizer solution Inhale 3 mLs into the lungs every 4 hours 3/3/21   CHRISTIANA Chandler CNP   lisinopril (PRINIVIL;ZESTRIL) 5 MG tablet take 1 tablet by mouth twice a day 1/19/21   CHRISTIANA Chadnler CNP   tamsulosin [facility]) 0.4 MG capsule take 1 capsule by mouth once daily 11/4/20   CHRISTIANA Chandler CNP   apixaban (ELIQUIS) 5 MG TABS tablet Take 1 tablet by mouth 2 times daily 12/12/19   CHRISTIANA Herbert CNP   Blood Glucose Monitoring Suppl (TRUE METRIX AIR GLUCOSE METER) w/Device KIT 1 kit by Does not apply route 2 times daily 8/28/19   CHRISTIANA Herbert CNP   blood glucose test strips (ASCENSIA AUTODISC VI;ONE TOUCH ULTRA TEST VI) strip 1 each by Other route 2 times daily As needed. 8/28/19   CHRISTIANA Herbert CNP   ranolazine (RANEXA) 500 MG extended release tablet Take 500 mg by mouth 2 times daily    Historical Provider, MD   Blood Pressure Monitoring (BLOOD PRESSURE CUFF) MISC 1 each by Does not apply route daily as needed (monitoring) 2/20/19   CHRISTIANA Herbert CNP   Glucose Blood (BLOOD GLUCOSE TEST STRIPS) STRP Testing qd -bid prn 5/15/13   Clif Bradshaw DO       REVIEW OF SYSTEMS       Review of Systems   Constitutional:  Negative for chills and fever. HENT:  Negative for congestion. Respiratory:  Negative for shortness of breath. Cardiovascular:  Negative for chest pain. Gastrointestinal:  Positive for abdominal pain. Negative for nausea and vomiting. Genitourinary:  Positive for decreased urine volume and frequency. Negative for dysuria. Musculoskeletal:  Negative for back pain and neck pain. Skin:  Negative for rash. Neurological:  Negative for headaches. Psychiatric/Behavioral:  Negative for confusion.       PHYSICAL EXAM      INITIAL VITALS:   /79   Pulse 86   Temp 98 °F (36.7 °C) (Oral)   Resp 20   Ht 5' 10\" (1.778 m)   Wt 235 lb (106.6 kg)   SpO2 95%   BMI 33.72 kg/m²     Physical Exam  Vitals reviewed. Constitutional:       General: He is not in acute distress. Appearance: Normal appearance. He is not ill-appearing. HENT:      Head: Normocephalic and atraumatic. Right Ear: External ear normal.      Left Ear: External ear normal.      Nose: Nose normal.      Mouth/Throat:      Mouth: Mucous membranes are moist.   Eyes:      General:         Right eye: No discharge. Left eye: No discharge. Cardiovascular:      Rate and Rhythm: Normal rate and regular rhythm. Pulses: Normal pulses. Pulmonary:      Effort: Pulmonary effort is normal. No respiratory distress. Breath sounds: Normal breath sounds. Abdominal:      Comments: Mild suprapubic tenderness. No CVA tenderness bilaterally   Musculoskeletal:      Cervical back: Normal range of motion. Comments: Moving all 4 extremities   Skin:     General: Skin is warm. Capillary Refill: Capillary refill takes less than 2 seconds. Neurological:      General: No focal deficit present. Mental Status: He is alert and oriented to person, place, and time. Cranial Nerves: No cranial nerve deficit. Psychiatric:         Mood and Affect: Mood normal.         DDX/DIAGNOSTIC RESULTS / EMERGENCY DEPARTMENT COURSE / MDM     Medical Decision Making  DDx: UTI, acute urinary retention, prostate issue, AP, electrolyte abnormality    77-year-old male presents with recent cystoscopy and since then has been having issues urinating. States he is going much more often than usual and unable to get much out. Concerned he is retaining. Recently found of prostate problems, planning on having operative intervention soon. Patient appears well initial evaluation, afebrile, stable vital signs. Abdomen soft, mildly tender in the suprapubic region. Will bladder scan. We will check basic labs. Will send urine for urinalysis.   If patient is retaining will plan to place [place], if not we will continue to monitor. Will reassess. Problems Addressed:  Urinary tract infection without hematuria, site unspecified: complicated acute illness or injury    Amount and/or Complexity of Data Reviewed  Labs: ordered. Decision-making details documented in ED Course. Risk  Prescription drug management. EKG  None    All EKG's are interpreted by the Emergency Department Physician who either signs or Co-signs this chart in the absence of a cardiologist.    EMERGENCY DEPARTMENT COURSE:      ED Course as of 02/22/23 2146 Wed Feb 22, 2023 2041 WBC(!): 16.1 [AB]   2041 Hemoglobin Quant: 15.0 [AB]   2046 Urinalysis showing UTI [AB]   2050 Creatinine: 0.98 [AB]   2119 We will start patient on Cipro due to recent instrumentation. Will discharge at this time. Will have close follow-up with PCP as well as urologist.  Given strict return precautions. Patient agreed with plan. [AB]      ED Course User Index  [AB] Marisol Push, DO       PROCEDURES:  None    CONSULTS:  None    CRITICAL CARE:  There was significant risk of life threatening deterioration of patient's condition requiring my direct management. Critical care time 0 minutes, excluding any documented procedures. FINAL IMPRESSION      1.  Urinary tract infection without hematuria, site unspecified          DISPOSITION / PLAN     DISPOSITION Decision To Discharge 02/22/2023 09:20:20 PM      PATIENT REFERRED TO:  [facility] ED  [facility] [address]  [address] [address]  [phone]  Go to   If symptoms worsen    Lauri Reich, APRN - CNP  [address], [facility] [address]  [phone]    Schedule an appointment as soon as possible for a visit in 3 days      DISCHARGE MEDICATIONS:  New Prescriptions    CIPROFLOXACIN (CIPRO) 500 MG TABLET    Take 1 tablet by mouth 2 times daily for 10 days       Jignesh Baker DO  Emergency Medicine Resident    (Please note that portions of thisnote were completed with a voice recognition program.  Efforts were made to edit the dictations but occasionally words are mis-transcribed.)        Marisol Perez DO  Resident  02/22/23 [id number]

## 2023-02-23 NOTE — ED PROVIDER NOTES
EMERGENCY DEPARTMENT ENCOUNTER   ATTENDING ATTESTATION     Pt Name: Mckenzie Mullen  MRN: [id number]  [place] 1950  Date of evaluation: 2/22/23       Mckenzie Mullen is a 67 y.o. male who presents with Urinary Retention      MDM: 70-year-old male presents for complaints of concern for urinary retention. Patient had recent cystoscopy since then he has been having dribbling and urinary frequency    On initial exam patient in no acute distress vital stable bladder scan performed no evidence of retention at this time will check labs and urine    UA was consistent with urinary tract infection suspect this is likely cause of his symptoms suspect this is causing his urinary frequency    Did have a white count of 16 consistent with urinary tract infection, remaining labs were unremarkable    We will start on course of antibiotic    Given that he has stable vitals, no significant lab abnormalities feel that he is appropriate for discharge home and outpatient treatment discussed results with patient discussed need for follow-up with his PCP, urology, and return precautions, patient voiced understanding comfortable plan and discharge home    Patient/Guardian was informed of their diagnosis and told to follow up with PCP & urology in 1-3 days. Patient demonstrates understanding and agreement with the plan. They were given the opportunity to ask questions and those questions were answered to the best of our ability with the available information. Patient/Guardian told to return to the ED for any new, worsening, changing or persistent symptoms. This dictation was prepared using [employer] voice recognition software. Vitals:   Vitals:    02/22/23 1831   BP: 122/79   Pulse: 86   Resp: 20   Temp: 98 °F (36.7 °C)   TempSrc: Oral   SpO2: 95%   Weight: 235 lb (106.6 kg)   Height: 5' 10\" (1.778 m)         I personally saw and examined the patient.  I have reviewed and agree with the resident's findings, including all diagnostic interpretations and treatment plan as written. I was present for the key portions of any procedures performed and the inclusive time noted for any critical care statement. The care is provided during an unprecedented national emergency due to the novel coronavirus, COVID 19.   Shanon Avalos DO  Attending Emergency Physician           Shanon Avalos DO  02/23/23 9917

## 2023-02-23 NOTE — DISCHARGE INSTRUCTIONS
Your urine test showed that you have a urinary tract infection. You are not retaining urine. Please take Xanax as prescribed. Please follow-up with your primary care provider as well as your urologist as soon as possible. Please return the emergency room if you develop any worsening or concerning symptoms.

## 2023-02-24 LAB
MICROORGANISM SPEC CULT: ABNORMAL
SPECIMEN DESCRIPTION: ABNORMAL

## 2023-03-13 ENCOUNTER — HOSPITAL ENCOUNTER (OUTPATIENT)
Dept: CARDIAC REHAB | Age: 73
Setting detail: THERAPIES SERIES
Discharge: HOME | End: 2023-03-13
Payer: MEDICARE

## 2023-03-14 ENCOUNTER — TELEPHONE (OUTPATIENT)
Dept: UROLOGY | Age: 73
End: 2023-03-14

## 2023-03-14 NOTE — TELEPHONE ENCOUNTER
Spoke with patient, he states that he doesn't wish to schedule at this time. He will call the office if there is anything else he needs.

## 2023-03-16 ENCOUNTER — HOSPITAL ENCOUNTER (OUTPATIENT)
Dept: CARDIAC REHAB | Age: 73
Setting detail: THERAPIES SERIES
Discharge: HOME | End: 2023-03-16
Payer: MEDICARE

## 2023-03-16 VITALS — HEIGHT: 70 IN | RESPIRATION RATE: 16 BRPM | BODY MASS INDEX: 32.47 KG/M2 | WEIGHT: 226.8 LBS | OXYGEN SATURATION: 96 %

## 2023-03-16 PROCEDURE — 93797 PHYS/QHP OP CAR RHAB WO ECG: CPT

## 2023-03-16 PROCEDURE — 93798 PHYS/QHP OP CAR RHAB W/ECG: CPT

## 2023-03-16 ASSESSMENT — EJECTION FRACTION: EF_VALUE: 55

## 2023-03-16 ASSESSMENT — PATIENT HEALTH QUESTIONNAIRE - PHQ9
SUM OF ALL RESPONSES TO PHQ QUESTIONS 1-9: 1
2. FEELING DOWN, DEPRESSED OR HOPELESS: 0
SUM OF ALL RESPONSES TO PHQ QUESTIONS 1-9: 1
SUM OF ALL RESPONSES TO PHQ QUESTIONS 1-9: 1
SUM OF ALL RESPONSES TO PHQ9 QUESTIONS 1 & 2: 1
1. LITTLE INTEREST OR PLEASURE IN DOING THINGS: 1
SUM OF ALL RESPONSES TO PHQ QUESTIONS 1-9: 1

## 2023-03-16 ASSESSMENT — EXERCISE STRESS TEST
PEAK_METS: 3.2
PEAK_RPE: 12
PEAK_BP: 100/60
PEAK_BP: 100/60
PEAK_HR: 80

## 2023-03-16 NOTE — PLAN OF CARE
Treatment Diagnosis 1      Treatment Diagnosis    PCI  (X1)    02/16/23       PCI Date    Referral Date 02/16/23   Significant Cardiovascular History Pacemaker;Previous myocardial infarction;Previous PC   03/16/23 0829   I  (STENTS IN 2009, 2018, 2022; HTN; ATRIAL FLUTTER)   Co-morbidities Diabetes;Previous MI;Pulmonary disease  (EMPHYSEMA; COPD; OBESITY; HDL)   Oxygen Saturation / Titration    Stages of Change  Maintenance   Oxygen Intervention   Oxygen Use No   O2 Sat Greater Than 90% Yes  (96%)   Nurse/Patient Discussion  YES   Individual Treatment Plan   ITP Visit Type Initial assessment   1st Date of Exercise  03/16/23   ITP Next Review Date 04/13/23   Visit #/Total Visits 1&2/36   EF% 55 %   Risk Stratification Moderate   ITP Exercise;Psychosocial;Tobacco;Nutrition;Education   Exercise    [place] Total Score 15   DASI Total Score 18.7   Stages of Change Preparation   Assisted Devices None  (PT HAS A  CANE BUT DOES NOT USE IT)   Exercise Prescription   Mode Treadmill;Bike;Stepper  (FREE WEIGHTS)   Frequency per week 3   Duration Per Session 31 MIN   Intensity METS       3.2   RPE 12   Progression INITIAL   Symptoms with Exercise Shortness of breath  (SL SOB AT THE END BUT TOOK AT BREAK AND FINISHED LAST SET; 02 SATS 94%)   Resistance Training Yes   Exercise Blood Pressures   Resting /58   Peak /60   Is BP WDL Yes   Exercise Activity at Home   Type NONE   Resistance Training No   Exercise Target Goal   Target Goal(s) Individual exercise RX;BP < 140/90 or < 130/80, if DM or CKD; Aerobic activity 30 + minutes/day  5 days/week   Patient Stated Exercise Goals TO BREATHE EASIER   Psychosocial   Stages of Change Maintenance   PHQ-9 Total Score 1   Psychosocial Intervention   Interventions No intervention indicated   Currently Taking Psychotropic Meds No   Medication Changes No   Psychosocial Education   Education Benefits of CPR completion;Cardiac meds; Coping techniques; Environmental triggers; Impact self care behaviors on health;Relaxation techniques; Sexual activity; Signs/symptoms of depression   Psychosocial Target Goals   Target Goal(s) Assess presence or absence of depression using a valid screening tool;Demonstrates appropriate interaction with others;Engages in self-care behaviors;Maximizes coping skills   Uses Stress Mgmt Techniques Yes   Tobacco   Stages of Change Maintenance   Tobacco Use No   Quit Greater than 6 month   Date Quit 01/01/16   # Cigarette Smoked/Day 1/2 PPD FOR 54 YEARS   Tobacco Cessation Intervention   Smoking Cessation Referral No   Target Goal   Target Goal Complete cessation of tobacco use   Nutrition   Stages of Change Preparation  (PT STATES HE REALLY DOES NOT WATCH A LOW SODIUM DIET; INFORMATION GIVEN TO PATIENT.)   Diabetes Yes   HgbA1c 6.0   Diabetes Med(s) YES   Diabetes Med(s) Change No   HgbA1c Date 12/16/22   Lipids   Date Lipids Drawn 12/16/22   Total 114   HDL 36   LDL 40   Triglycerides 189   Lipid Med(s) YES   Lipid Med Change(s) No   Weight Management   Alcohol None   Weight  226 lb 12.8 oz (102.9 kg)   Height  5' 10\" (1.778 m)   BMI 32.61   Nutrition Intervention   Dietitian Consult No   Nurse/Patient Discussion Yes   Nutrition Class No   Diabetes Education Referral No   Lipid Clinic Referral No   Weight Management Referral No   Nutrition Education   Education Signs/symptoms/treatment of hypo/hyperglycemia;DM & CAD relationship;Low saturated fat diet; Low sodium diet;Limit added sugars; Overall healthy eating pattern that emphasizes vegetables, fruits, wholegrains, healthy proteins and non-tropical oils; Reduced calorie/portion controlled diet   Nutrition Target Goals   Target Goals LDL-C less than 70 and non-HDL-C <100 for those with ASCVD;Triglycerides less than 150;HbA1C less than 7 percent for those with diabetes   Education   Learning Barrier Ready to learn   Cardiac Knowledge Total Score 9   Education Intervention   Education Schedule Given Yes   Patient Education Education CAD;Risk factors;Med compliance;Cardiac A&P;Signs/Symptoms of angina; Sexuality   Hypertension Yes   Hypertension Controlled Yes   Is BP WDL Yes   Med(s) Change No   BP Meds YES   ACE/ARB Prescribed Yes   ACE/ARB Adherent Yes   ASA Prescribed No   Antiplatelet Prescribed No   BB Prescribed Yes   BB Adherent Yes   Statins Prescribed No  (TAKES REPATHA SURECLICK 608HB/WA SQ B4Y)   Education Target Goals   Target Goals Medication compliance; Risk factors; Understand target guidelines for lipids; Understand target guidelines for B/P   Patient Stated Education Goals TO [address] [place] [address]   Staff Treatment Goals   Goals Exercise; Functional capacity   Exercise Goal TO INCREASE STRENGTH   Exercise Goal Status Initial   Exercise Goal Assessment Recommendations   Functional Capacity Goal TO INCREASE ENDURANCE   Functional Capacity Goal Status Initial   Functional Capacity Goal Assessment Recommendations

## 2023-03-16 NOTE — DISCHARGE INSTRUCTIONS
Cardiac Rehabilitation: After Your Visit  Your Care Instructions  Cardiac rehabilitation is a program for people who have had a heart attack or bypass surgery, or who have other heart problems. The program includes exercise, lifestyle changes, education, and emotional support. Cardiac rehab can help you improve the quality of your life through better overall health. It can help you lose weight and feel better about yourself. On your cardiac rehab team, you may have your doctor, a nurse specialist, a dietitian, and a physical therapist. They will design your cardiac rehab program specifically for you. You will learn how to reduce your risk for heart problems, how to manage stress, and how to eat a heart-healthy diet. By the end of the program, you will be ready to maintain a healthier lifestyle on your own. Follow-up care is a key part of your treatment and safety. Be sure to make and go to all appointments, and call your doctor if you are having problems. Its also a good idea to know your test results and keep a list of the medicines you take. How can you care for yourself at home? Take your medicines exactly as prescribed. Call your doctor if you think you are having a problem with your medicine. You will get more details on the specific medicines your doctor prescribes. Monitor your weight by weighing yourself at least once a week on the same scale with the same amount of clothing at the same time of day. Plan your meals so that you are eating heart-healthy foods. Eat a variety of foods daily. Fresh fruits and vegetables and whole-grains are good choices. Limit your fat intake, especially saturated and trans fat. Limit salt (sodium). Increase fiber in your diet. Limit alcohol. Learn how to take your pulse so that you can track your heart rate during exercise. Always check with your doctor before you begin a new exercise program.  Warm up before you exercise and cool down afterward.  This will help your heart gradually prepare for and recover from exercise and avoid pushing your heart too hard. Stop exercising if you have any unusual discomfort, such as chest pain. Do not smoke. Smoking can make heart problems worse. If you need help quitting, talk to your doctor about stop-smoking programs and medicines. These can increase your chances of quitting for good. When should you call for help? Call 911 anytime you think you may need emergency care. For example, call if:  You have severe trouble breathing. You cough up pink, foamy mucus and you have trouble breathing. You have symptoms of a heart attack. These may include:  Chest pain or pressure, or a strange feeling in the chest.  Sweating. Shortness of breath. Nausea or vomiting. Pain, pressure, or a strange feeling in the back, neck, jaw, or upper belly or in one or both shoulders or arms. Lightheadedness or sudden weakness. A fast or irregular heartbeat. After you call 911, the  may tell you to chew 1 adult-strength or 2 to 4 low-dose aspirin. Wait for an ambulance. Do not try to drive yourself. You have signs of a stroke such as:  Sudden numbness, paralysis, or weakness in your face, arm, or leg, especially on only one side of your body. New problems with walking or balance. Sudden vision changes. Drooling or slurred speech. New problems speaking or understanding simple statements, or feeling confused. A sudden, severe headache that is different from past headaches. You passed out (lost consciousness). Call your doctor now or seek immediate medical care if:  You have new or increased shortness of breath. You are dizzy or lightheaded, or you feel like you may faint. You gain 2 to 3 pounds or more over 2 days. You have increased swelling in your legs, ankles, or feet. Watch closely for changes in your health, and be sure to contact your doctor if you have any problems.     EXERCISE GUIDELINES    If you experience any unusual feelings during exercise, please inform the RN immediately. Take your prescribed medications before class or at the normal time. Wear comfortable clothes and shoes. Please arrive on time for your class. If you arrive too early, you may have to wait. Park in Cardiac Rehab/ Heart Failure Parking spots or use our free [address] parking. Please call us if you need to cancel. This is very important and will prevent you from being discharged from the program. Our direct number is [phone]. Please advise us if you have had any chest pain, shortness of breath, or other problems since your last session. We are here to help you return to a more active and healthy lifestyle. In addition to the RN's who will work with you at all times during your sessions, we have a dietician and a psychologist to assist as needed. Please don't hesitate to ask any questions or discuss any concerns you may have. NITROGLYCERIN TABLET INSTRUCTIONS  Nitroglycerin belongs to the group of medications called nitrates. It is used to improve blood flow to the heart. It also makes it easier for your heart to pump effectively. Nitroglycerin does this by relaxing and opening your veins and small arteries. Nitroglycerin's main purpose is to relive the pain of angina attacks. It is available by prescription only. PROPER USE OF NITROGLYCERIN:  1. Place the tablet under your tongue only (sublingual.) Nitroglycerin tablets are ineffective if chewed or swallowed. Hold saliva in your mouth as long as possible to permit best absorption from the lining of your mouth. Do not eat, drink, chew, or smoke while a tablet is dissolving.   2. Signs of angina (inadequate blood supply to the heart) include: pain, discomfort, heaviness, squeezing or tightness in the chest, arms, jaw, throat, or upper back; numbness and tingling of one or both arms and hands; sudden onset of extreme weakness or fatigue; nausea, extreme sweating, indigestion or a choking sensation. If you experience any of these signs you should:     A. Immediately rest. A sitting position is best for your heart. However if you are dizzy, lie down. B. Take a nitroglycerin tablet. You may take one tablet every 5 minutes for a total of three tablets. C. Relax. Concentrate on relaxing all the muscles in your body and breathing slowly and easily. D. If the pain lasts longer than 15 minutes, CALL 911. E. After the symptoms subside, rest at least 30 minutes while sitting or lying. Nitroglycerin works within 1-3 minutes and is out of your system with 30-60 minutes. You may experience a headache, dizziness, light-headedness, or a faint feeling, especially when getting up from a sitting or lying position. STORAGE OF NITROGLYCERIN:  1. Keep the tablets in their original brown bottle. (They are sensitive to heat, moisture, and light.)  2. Remove the cotton plug that comes in the bottle and do not put it back in.  3. Put the cap on the bottle quickly and tightly after each use. 4. To select a tablet for use, pour several into the bottle cap, take one, and pour the others back into the bottle. Do not hold them in the palm of your hand because they will  moisture and lose effect. 5. Do not keep other medications in the same bottle with the nitroglycerin since they will weaken the nitroglycerin effect. 6. Keep the medicine handy at all times but do not carry the bottle close to your body. Nitroglycerin may lose strength because of body warmth. Instead carry the bottle in your purse, jacket pocket, or loose fitting clothing. 7. Store additional nitroglycerin tablets in a cool, dry place. Average room temperature away form direct heat or sunlight is best. Do not sore in the bathroom medicine cabinet or refrigerator because the moisture usually present in these areas will spoil the tablets. 8. Be sure to check the expiration date on the bottle.  Nitroglycerin tablets lose their effectiveness (potency) after the expiration date. Generally, tablets are good for 6 months. Ask for new tablets at least 2 weeks before they are due to . SIDE EFFECTS:  The most common side effects of nitroglycerin are headache, dizziness, and flushing of the face and neck. These side effects are caused by increased blood flow through the relaxed blood vessels. If you have any concerns, let your health care provider know. If you develop a skin rash let your healthcare provider know. Nitroglycerin is not habit forming or addicting. Do not be afraid to use it. Record all angina attacks relieved by taking nitroglycerin and show this record to your healthcare provider at your regular check - up. If you have an increasing number of controlled attacks (symptoms go away after taking two or fewer nitroglycerin tablets and resting), notify your healthcare provider. He or she may need to adjust your daily medication or want to see you. Additionally, report any change in the type of angina to your healthcare provider within 24 hours of its occurrence. Always report angina experienced at night while sleeping winthin 24 hours of its occurrence. Where can you learn more? Go to https://[employer]. org and sign in to your [employer] account. Enter X560 in the Hyperpot box to learn more about Cardiac Rehabilitation: After Your Visit.     If you do not have an account, please click on the Sign Up Now link. © 4993-7444 [employer], [employer]. Care instructions adapted under license by [facility]. This care instruction is for use with your licensed healthcare professional. If you have questions about a medical condition or this instruction, always ask your healthcare professional. [place] [address] any warranty or liability for your use of this information.   Content Version: 7.6.580933; Last Revised: 2013

## 2023-03-20 ENCOUNTER — HOSPITAL ENCOUNTER (OUTPATIENT)
Dept: CARDIAC REHAB | Age: 73
Setting detail: THERAPIES SERIES
Discharge: HOME | End: 2023-03-20
Payer: MEDICARE

## 2023-03-20 VITALS — WEIGHT: 225.6 LBS | BODY MASS INDEX: 32.37 KG/M2

## 2023-03-20 PROCEDURE — 93798 PHYS/QHP OP CAR RHAB W/ECG: CPT

## 2023-03-20 ASSESSMENT — EXERCISE STRESS TEST
PEAK_RPE: 12
PEAK_METS: 4
PEAK_BP: 140/72
PEAK_HR: 119

## 2023-03-20 NOTE — PROGRESS NOTES
Pt reports that he had an episode of left chest and shoulder pain on Sat lasting about 30 seconds that resolved with one asher of PRN Nitro. He states that this is not new for him and it is happening in less frequency. He continues to have chronic SOB due to his Emphysema. He reports that it has not worsened. It tends to bother him more at night when he is trying to sleep. He reports he hasn't been taking his Advair because it causes throat discomfort. I did advise for him to talk to his Pulmonologist about this. He did well with exercise session today. SPO2 95% during exercise. He stated that he felt SOB and a \"a little dizzy\" the last minute of being on the treadmill. Advised that if he feels that way again he should stop exercising and let us know.

## 2023-03-22 ENCOUNTER — HOSPITAL ENCOUNTER (OUTPATIENT)
Dept: CARDIAC REHAB | Age: 73
Setting detail: THERAPIES SERIES
Discharge: HOME | End: 2023-03-22
Payer: MEDICARE

## 2023-03-22 NOTE — PROGRESS NOTES
Pt called in to cancel cardiac rehab apt today due to increased SOb due to his Emphysema. I asked pt if he had used his inhalers and he had not. I advised for him to use them now. States he will follow up with his PCP. Advised if worsens he should go to the ER for further evaluation.

## 2023-03-23 ENCOUNTER — HOSPITAL ENCOUNTER (OUTPATIENT)
Dept: CARDIAC REHAB | Age: 73
Setting detail: THERAPIES SERIES
Discharge: HOME | End: 2023-03-23
Payer: MEDICARE

## 2023-03-23 NOTE — PROGRESS NOTES
Pt arrives to cardiac rehab complaining of continued SOB. SPO2 95%RA. He does not feel that he is able to exercise this am. Reports he had taken his inhalers as directed and that he has an apt with his PCP in 1 hour. Advised to skip exercise today and to go to the apt with PCP although if he feels that this is worsening he should go to the ER for evaluation. Pt v/u.

## 2023-03-29 ENCOUNTER — HOSPITAL ENCOUNTER (OUTPATIENT)
Dept: CARDIAC REHAB | Age: 73
Setting detail: THERAPIES SERIES
Discharge: HOME | End: 2023-03-29
Payer: MEDICARE

## 2023-03-29 VITALS — BODY MASS INDEX: 31.97 KG/M2 | WEIGHT: 222.8 LBS

## 2023-03-29 PROCEDURE — 93798 PHYS/QHP OP CAR RHAB W/ECG: CPT

## 2023-03-29 ASSESSMENT — EXERCISE STRESS TEST
PEAK_METS: 4.2
PEAK_BP: 120/66
PEAK_RPE: 13
PEAK_HR: 130

## 2023-03-30 ENCOUNTER — HOSPITAL ENCOUNTER (OUTPATIENT)
Dept: CARDIAC REHAB | Age: 73
Setting detail: THERAPIES SERIES
Discharge: HOME | End: 2023-03-30
Payer: MEDICARE

## 2023-03-30 VITALS — BODY MASS INDEX: 31.95 KG/M2 | WEIGHT: 222.7 LBS

## 2023-03-30 PROCEDURE — 93798 PHYS/QHP OP CAR RHAB W/ECG: CPT

## 2023-03-30 ASSESSMENT — EXERCISE STRESS TEST
PEAK_HR: 127
PEAK_RPE: 14
PEAK_BP: 112/60
PEAK_METS: 5.4

## 2023-04-03 ENCOUNTER — HOSPITAL ENCOUNTER (OUTPATIENT)
Dept: CARDIAC REHAB | Age: 73
Setting detail: THERAPIES SERIES
Discharge: HOME | End: 2023-04-03
Payer: MEDICARE

## 2023-04-05 ENCOUNTER — HOSPITAL ENCOUNTER (OUTPATIENT)
Dept: CARDIAC REHAB | Age: 73
Setting detail: THERAPIES SERIES
Discharge: HOME | End: 2023-04-05
Payer: MEDICARE

## 2023-04-05 VITALS — BODY MASS INDEX: 32.44 KG/M2 | WEIGHT: 226.1 LBS

## 2023-04-05 PROCEDURE — 93798 PHYS/QHP OP CAR RHAB W/ECG: CPT

## 2023-04-05 ASSESSMENT — EXERCISE STRESS TEST
PEAK_METS: 4.2
PEAK_RPE: 15
PEAK_HR: 108
PEAK_BP: 110/64

## 2023-04-05 NOTE — PROGRESS NOTES
Pt complained of increased SOB with exertion and light-headedness with activity the last few days. Pt thinks this may be related to hypotension after activity. SPO2 97-98% RA. Lungs CTA. Pt arrives with BP today of 102/62 but after exercise was 92/54. He states he did feel \" a little light-headed the last few minutes of exercising today. \"After resting and drinking water this did improve to 102/62 and symptom resolved. Called and spoke to Dr. Atif Reis who gave new instructions to stop taking Lisinopril. Discussed new instructions with pt and verbalized understanding. Pt will return to clinic tomorrow.

## 2023-04-06 ENCOUNTER — HOSPITAL ENCOUNTER (OUTPATIENT)
Dept: CARDIAC REHAB | Age: 73
Setting detail: THERAPIES SERIES
Discharge: HOME | End: 2023-04-06
Payer: MEDICARE

## 2023-04-06 ENCOUNTER — HOSPITAL ENCOUNTER (INPATIENT)
Age: 73
LOS: 1 days | Discharge: HOME | DRG: 313 | End: 2023-04-07
Attending: EMERGENCY MEDICINE | Admitting: INTERNAL MEDICINE
Payer: MEDICARE

## 2023-04-06 ENCOUNTER — HOSPITAL ENCOUNTER (OUTPATIENT)
Age: 73
Setting detail: OBSERVATION
Discharge: LEFT BEFORE DISCHARGE | DRG: 313 | End: 2023-04-06
Attending: EMERGENCY MEDICINE | Admitting: INTERNAL MEDICINE
Payer: MEDICARE

## 2023-04-06 ENCOUNTER — APPOINTMENT (OUTPATIENT)
Dept: GENERAL RADIOLOGY | Age: 73
DRG: 313 | End: 2023-04-06
Payer: MEDICARE

## 2023-04-06 VITALS
HEIGHT: 71 IN | WEIGHT: 223.5 LBS | BODY MASS INDEX: 31.29 KG/M2 | SYSTOLIC BLOOD PRESSURE: 104 MMHG | RESPIRATION RATE: 18 BRPM | TEMPERATURE: 97.7 F | DIASTOLIC BLOOD PRESSURE: 78 MMHG | HEART RATE: 60 BPM | OXYGEN SATURATION: 96 %

## 2023-04-06 VITALS — WEIGHT: 224.5 LBS | BODY MASS INDEX: 32.21 KG/M2

## 2023-04-06 DIAGNOSIS — R07.9 CHEST PAIN, UNSPECIFIED TYPE: Primary | ICD-10-CM

## 2023-04-06 DIAGNOSIS — E83.42 HYPOMAGNESEMIA: ICD-10-CM

## 2023-04-06 PROBLEM — R06.02 SOB (SHORTNESS OF BREATH) ON EXERTION: Status: ACTIVE | Noted: 2023-04-06

## 2023-04-06 LAB
ABSOLUTE EOS #: 0.2 K/UL (ref 0–0.4)
ABSOLUTE LYMPH #: 2.2 K/UL (ref 1–4.8)
ABSOLUTE MONO #: 0.7 K/UL (ref 0.1–1.3)
ALBUMIN SERPL-MCNC: 3.9 G/DL (ref 3.5–5.2)
ALP SERPL-CCNC: 57 U/L (ref 40–129)
ALT SERPL-CCNC: 14 U/L (ref 5–41)
ANION GAP SERPL CALCULATED.3IONS-SCNC: 12 MMOL/L (ref 9–17)
AST SERPL-CCNC: 16 U/L
BASOPHILS # BLD: 1 % (ref 0–2)
BASOPHILS ABSOLUTE: 0 K/UL (ref 0–0.2)
BILIRUB SERPL-MCNC: 0.4 MG/DL (ref 0.3–1.2)
BNP SERPL-MCNC: 230 PG/ML
BUN SERPL-MCNC: 12 MG/DL (ref 8–23)
CALCIUM SERPL-MCNC: 8.5 MG/DL (ref 8.6–10.4)
CHLORIDE SERPL-SCNC: 107 MMOL/L (ref 98–107)
CO2 SERPL-SCNC: 21 MMOL/L (ref 20–31)
CREAT SERPL-MCNC: 0.65 MG/DL (ref 0.7–1.2)
EKG ATRIAL RATE: 66 BPM
EKG P AXIS: 51 DEGREES
EKG Q-T INTERVAL: 432 MS
EKG QRS DURATION: 136 MS
EKG QTC CALCULATION (BAZETT): 452 MS
EKG R AXIS: -72 DEGREES
EKG T AXIS: 98 DEGREES
EKG VENTRICULAR RATE: 66 BPM
EOSINOPHILS RELATIVE PERCENT: 3 % (ref 0–4)
FLUAV RNA RESP QL NAA+PROBE: NOT DETECTED
FLUBV RNA RESP QL NAA+PROBE: NOT DETECTED
GFR SERPL CREATININE-BSD FRML MDRD: >60 ML/MIN/1.73M2
GLUCOSE BLD-MCNC: 118 MG/DL (ref 75–110)
GLUCOSE BLD-MCNC: 165 MG/DL (ref 75–110)
GLUCOSE SERPL-MCNC: 163 MG/DL (ref 70–99)
HCT VFR BLD AUTO: 45.4 % (ref 41–53)
HGB BLD-MCNC: 15 G/DL (ref 13.5–17.5)
INR PPP: 1.2
LYMPHOCYTES # BLD: 28 % (ref 24–44)
MAGNESIUM SERPL-MCNC: 1.4 MG/DL (ref 1.6–2.6)
MCH RBC QN AUTO: 29.3 PG (ref 26–34)
MCHC RBC AUTO-ENTMCNC: 33.1 G/DL (ref 31–37)
MCV RBC AUTO: 88.6 FL (ref 80–100)
MONOCYTES # BLD: 9 % (ref 1–7)
PDW BLD-RTO: 14.7 % (ref 11.5–14.9)
PLATELET # BLD AUTO: 192 K/UL (ref 150–450)
PMV BLD AUTO: 7.5 FL (ref 6–12)
POTASSIUM SERPL-SCNC: 4 MMOL/L (ref 3.7–5.3)
PROT SERPL-MCNC: 6.2 G/DL (ref 6.4–8.3)
PROTHROMBIN TIME: 15.9 SEC (ref 11.8–14.6)
RBC # BLD: 5.12 M/UL (ref 4.5–5.9)
SARS-COV-2 RNA RESP QL NAA+PROBE: NOT DETECTED
SEG NEUTROPHILS: 59 % (ref 36–66)
SEGMENTED NEUTROPHILS ABSOLUTE COUNT: 4.8 K/UL (ref 1.3–9.1)
SODIUM SERPL-SCNC: 140 MMOL/L (ref 135–144)
SOURCE: NORMAL
SPECIMEN DESCRIPTION: NORMAL
TROPONIN I SERPL DL<=0.01 NG/ML-MCNC: 12 NG/L (ref 0–22)
WBC # BLD AUTO: 7.9 K/UL (ref 3.5–11)

## 2023-04-06 PROCEDURE — 99285 EMERGENCY DEPT VISIT HI MDM: CPT

## 2023-04-06 PROCEDURE — 6370000000 HC RX 637 (ALT 250 FOR IP): Performed by: EMERGENCY MEDICINE

## 2023-04-06 PROCEDURE — 93010 ELECTROCARDIOGRAM REPORT: CPT | Performed by: INTERNAL MEDICINE

## 2023-04-06 PROCEDURE — 82947 ASSAY GLUCOSE BLOOD QUANT: CPT

## 2023-04-06 PROCEDURE — 6370000000 HC RX 637 (ALT 250 FOR IP)

## 2023-04-06 PROCEDURE — 93798 PHYS/QHP OP CAR RHAB W/ECG: CPT

## 2023-04-06 PROCEDURE — 87636 SARSCOV2 & INF A&B AMP PRB: CPT

## 2023-04-06 PROCEDURE — 83735 ASSAY OF MAGNESIUM: CPT

## 2023-04-06 PROCEDURE — 93005 ELECTROCARDIOGRAM TRACING: CPT | Performed by: EMERGENCY MEDICINE

## 2023-04-06 PROCEDURE — 2500000003 HC RX 250 WO HCPCS: Performed by: EMERGENCY MEDICINE

## 2023-04-06 PROCEDURE — 96366 THER/PROPH/DIAG IV INF ADDON: CPT

## 2023-04-06 PROCEDURE — 94761 N-INVAS EAR/PLS OXIMETRY MLT: CPT

## 2023-04-06 PROCEDURE — 2060000000 HC ICU INTERMEDIATE R&B

## 2023-04-06 PROCEDURE — 85610 PROTHROMBIN TIME: CPT

## 2023-04-06 PROCEDURE — 80053 COMPREHEN METABOLIC PANEL: CPT

## 2023-04-06 PROCEDURE — 83880 ASSAY OF NATRIURETIC PEPTIDE: CPT

## 2023-04-06 PROCEDURE — 85025 COMPLETE CBC W/AUTO DIFF WBC: CPT

## 2023-04-06 PROCEDURE — 9900000065 HC CARDIAC REHAB PHASE 3 - 1 VISIT

## 2023-04-06 PROCEDURE — 99223 1ST HOSP IP/OBS HIGH 75: CPT | Performed by: INTERNAL MEDICINE

## 2023-04-06 PROCEDURE — 71045 X-RAY EXAM CHEST 1 VIEW: CPT

## 2023-04-06 PROCEDURE — 36415 COLL VENOUS BLD VENIPUNCTURE: CPT

## 2023-04-06 PROCEDURE — 2580000003 HC RX 258

## 2023-04-06 PROCEDURE — 84484 ASSAY OF TROPONIN QUANT: CPT

## 2023-04-06 PROCEDURE — G0378 HOSPITAL OBSERVATION PER HR: HCPCS

## 2023-04-06 PROCEDURE — 6370000000 HC RX 637 (ALT 250 FOR IP): Performed by: NURSE PRACTITIONER

## 2023-04-06 PROCEDURE — 96365 THER/PROPH/DIAG IV INF INIT: CPT

## 2023-04-06 PROCEDURE — 94640 AIRWAY INHALATION TREATMENT: CPT

## 2023-04-06 RX ORDER — SODIUM CHLORIDE 0.9 % (FLUSH) 0.9 %
5-40 SYRINGE (ML) INJECTION PRN
Status: DISCONTINUED | OUTPATIENT
Start: 2023-04-06 | End: 2023-04-07 | Stop reason: HOSPADM

## 2023-04-06 RX ORDER — NITROGLYCERIN 0.4 MG/1
0.4 TABLET SUBLINGUAL EVERY 5 MIN PRN
Status: DISCONTINUED | OUTPATIENT
Start: 2023-04-06 | End: 2023-04-07 | Stop reason: HOSPADM

## 2023-04-06 RX ORDER — VARENICLINE TARTRATE 1 MG/1
1 TABLET, FILM COATED ORAL 2 TIMES DAILY
Status: DISCONTINUED | OUTPATIENT
Start: 2023-04-06 | End: 2023-04-06

## 2023-04-06 RX ORDER — ACETAMINOPHEN 650 MG/1
650 SUPPOSITORY RECTAL EVERY 6 HOURS PRN
Status: DISCONTINUED | OUTPATIENT
Start: 2023-04-06 | End: 2023-04-07 | Stop reason: HOSPADM

## 2023-04-06 RX ORDER — ISOSORBIDE MONONITRATE 30 MG/1
30 TABLET, EXTENDED RELEASE ORAL DAILY
Status: DISCONTINUED | OUTPATIENT
Start: 2023-04-06 | End: 2023-04-07

## 2023-04-06 RX ORDER — INSULIN LISPRO 100 [IU]/ML
0-4 INJECTION, SOLUTION INTRAVENOUS; SUBCUTANEOUS NIGHTLY
Status: CANCELLED | OUTPATIENT
Start: 2023-04-06

## 2023-04-06 RX ORDER — TAMSULOSIN HYDROCHLORIDE 0.4 MG/1
0.4 CAPSULE ORAL DAILY
Status: CANCELLED | OUTPATIENT
Start: 2023-04-06

## 2023-04-06 RX ORDER — DEXTROSE MONOHYDRATE 100 MG/ML
INJECTION, SOLUTION INTRAVENOUS CONTINUOUS PRN
Status: DISCONTINUED | OUTPATIENT
Start: 2023-04-06 | End: 2023-04-07 | Stop reason: HOSPADM

## 2023-04-06 RX ORDER — SODIUM CHLORIDE 0.9 % (FLUSH) 0.9 %
5-40 SYRINGE (ML) INJECTION EVERY 12 HOURS SCHEDULED
Status: DISCONTINUED | OUTPATIENT
Start: 2023-04-06 | End: 2023-04-07 | Stop reason: HOSPADM

## 2023-04-06 RX ORDER — INSULIN LISPRO 100 [IU]/ML
0-4 INJECTION, SOLUTION INTRAVENOUS; SUBCUTANEOUS NIGHTLY
Status: DISCONTINUED | OUTPATIENT
Start: 2023-04-06 | End: 2023-04-07 | Stop reason: HOSPADM

## 2023-04-06 RX ORDER — SODIUM CHLORIDE 0.9 % (FLUSH) 0.9 %
5-40 SYRINGE (ML) INJECTION PRN
Status: CANCELLED | OUTPATIENT
Start: 2023-04-06

## 2023-04-06 RX ORDER — METOPROLOL SUCCINATE 25 MG/1
25 TABLET, EXTENDED RELEASE ORAL NIGHTLY
Status: DISCONTINUED | OUTPATIENT
Start: 2023-04-06 | End: 2023-04-07 | Stop reason: HOSPADM

## 2023-04-06 RX ORDER — NITROGLYCERIN 0.4 MG/1
0.4 TABLET SUBLINGUAL EVERY 5 MIN PRN
Status: CANCELLED | OUTPATIENT
Start: 2023-04-06

## 2023-04-06 RX ORDER — INSULIN LISPRO 100 [IU]/ML
0-4 INJECTION, SOLUTION INTRAVENOUS; SUBCUTANEOUS
Status: DISCONTINUED | OUTPATIENT
Start: 2023-04-06 | End: 2023-04-07 | Stop reason: HOSPADM

## 2023-04-06 RX ORDER — POLYETHYLENE GLYCOL 3350 17 G/17G
17 POWDER, FOR SOLUTION ORAL DAILY PRN
Status: DISCONTINUED | OUTPATIENT
Start: 2023-04-06 | End: 2023-04-07 | Stop reason: HOSPADM

## 2023-04-06 RX ORDER — SODIUM CHLORIDE 9 MG/ML
INJECTION, SOLUTION INTRAVENOUS PRN
Status: CANCELLED | OUTPATIENT
Start: 2023-04-06

## 2023-04-06 RX ORDER — ALBUTEROL SULFATE 90 UG/1
2 AEROSOL, METERED RESPIRATORY (INHALATION) EVERY 6 HOURS PRN
Status: DISCONTINUED | OUTPATIENT
Start: 2023-04-06 | End: 2023-04-07 | Stop reason: HOSPADM

## 2023-04-06 RX ORDER — RANOLAZINE 500 MG/1
500 TABLET, EXTENDED RELEASE ORAL 2 TIMES DAILY
Status: CANCELLED | OUTPATIENT
Start: 2023-04-06

## 2023-04-06 RX ORDER — ALBUTEROL SULFATE 2.5 MG/3ML
2.5 SOLUTION RESPIRATORY (INHALATION) EVERY 6 HOURS PRN
Status: ON HOLD | COMMUNITY
End: 2023-04-07 | Stop reason: HOSPADM

## 2023-04-06 RX ORDER — TAMSULOSIN HYDROCHLORIDE 0.4 MG/1
0.4 CAPSULE ORAL NIGHTLY
Status: DISCONTINUED | OUTPATIENT
Start: 2023-04-06 | End: 2023-04-07 | Stop reason: HOSPADM

## 2023-04-06 RX ORDER — MAGNESIUM SULFATE HEPTAHYDRATE 40 MG/ML
2000 INJECTION, SOLUTION INTRAVENOUS ONCE
Status: COMPLETED | OUTPATIENT
Start: 2023-04-06 | End: 2023-04-06

## 2023-04-06 RX ORDER — ACETAMINOPHEN 650 MG/1
650 SUPPOSITORY RECTAL EVERY 6 HOURS PRN
Status: CANCELLED | OUTPATIENT
Start: 2023-04-06

## 2023-04-06 RX ORDER — METOPROLOL SUCCINATE 25 MG/1
25 TABLET, EXTENDED RELEASE ORAL NIGHTLY
Status: CANCELLED | OUTPATIENT
Start: 2023-04-06

## 2023-04-06 RX ORDER — ALBUTEROL SULFATE 90 UG/1
2 AEROSOL, METERED RESPIRATORY (INHALATION) EVERY 6 HOURS PRN
Status: CANCELLED | OUTPATIENT
Start: 2023-04-06

## 2023-04-06 RX ORDER — ACETAMINOPHEN 325 MG/1
650 TABLET ORAL EVERY 6 HOURS PRN
Status: CANCELLED | OUTPATIENT
Start: 2023-04-06

## 2023-04-06 RX ORDER — ASPIRIN 325 MG
325 TABLET ORAL ONCE
Status: COMPLETED | OUTPATIENT
Start: 2023-04-06 | End: 2023-04-06

## 2023-04-06 RX ORDER — VARENICLINE TARTRATE 1 MG/1
1 TABLET, FILM COATED ORAL 2 TIMES DAILY
Status: CANCELLED | OUTPATIENT
Start: 2023-04-06

## 2023-04-06 RX ORDER — RANOLAZINE 500 MG/1
500 TABLET, EXTENDED RELEASE ORAL 2 TIMES DAILY
Status: DISCONTINUED | OUTPATIENT
Start: 2023-04-06 | End: 2023-04-07 | Stop reason: HOSPADM

## 2023-04-06 RX ORDER — IPRATROPIUM BROMIDE AND ALBUTEROL SULFATE 2.5; .5 MG/3ML; MG/3ML
1 SOLUTION RESPIRATORY (INHALATION) EVERY 4 HOURS
Status: CANCELLED | OUTPATIENT
Start: 2023-04-06

## 2023-04-06 RX ORDER — TAMSULOSIN HYDROCHLORIDE 0.4 MG/1
0.4 CAPSULE ORAL DAILY
Status: DISCONTINUED | OUTPATIENT
Start: 2023-04-06 | End: 2023-04-06

## 2023-04-06 RX ORDER — ACETAMINOPHEN 325 MG/1
650 TABLET ORAL EVERY 6 HOURS PRN
Status: DISCONTINUED | OUTPATIENT
Start: 2023-04-06 | End: 2023-04-07 | Stop reason: HOSPADM

## 2023-04-06 RX ORDER — ISOSORBIDE MONONITRATE 30 MG/1
30 TABLET, EXTENDED RELEASE ORAL DAILY
Status: CANCELLED | OUTPATIENT
Start: 2023-04-06

## 2023-04-06 RX ORDER — INSULIN LISPRO 100 [IU]/ML
0-4 INJECTION, SOLUTION INTRAVENOUS; SUBCUTANEOUS
Status: CANCELLED | OUTPATIENT
Start: 2023-04-06

## 2023-04-06 RX ORDER — ONDANSETRON 2 MG/ML
4 INJECTION INTRAMUSCULAR; INTRAVENOUS EVERY 6 HOURS PRN
Status: CANCELLED | OUTPATIENT
Start: 2023-04-06

## 2023-04-06 RX ORDER — OMEPRAZOLE 20 MG/1
20 CAPSULE, DELAYED RELEASE ORAL DAILY
COMMUNITY

## 2023-04-06 RX ORDER — POLYETHYLENE GLYCOL 3350 17 G/17G
17 POWDER, FOR SOLUTION ORAL DAILY PRN
Status: CANCELLED | OUTPATIENT
Start: 2023-04-06

## 2023-04-06 RX ORDER — DEXTROSE MONOHYDRATE 100 MG/ML
INJECTION, SOLUTION INTRAVENOUS CONTINUOUS PRN
Status: CANCELLED | OUTPATIENT
Start: 2023-04-06

## 2023-04-06 RX ORDER — SODIUM CHLORIDE 9 MG/ML
INJECTION, SOLUTION INTRAVENOUS PRN
Status: DISCONTINUED | OUTPATIENT
Start: 2023-04-06 | End: 2023-04-07 | Stop reason: HOSPADM

## 2023-04-06 RX ORDER — ONDANSETRON 4 MG/1
4 TABLET, ORALLY DISINTEGRATING ORAL EVERY 8 HOURS PRN
Status: CANCELLED | OUTPATIENT
Start: 2023-04-06

## 2023-04-06 RX ORDER — SODIUM CHLORIDE 0.9 % (FLUSH) 0.9 %
5-40 SYRINGE (ML) INJECTION EVERY 12 HOURS SCHEDULED
Status: CANCELLED | OUTPATIENT
Start: 2023-04-06

## 2023-04-06 RX ORDER — VARENICLINE TARTRATE 1 MG/1
1 TABLET, FILM COATED ORAL DAILY
Status: DISCONTINUED | OUTPATIENT
Start: 2023-04-06 | End: 2023-04-07 | Stop reason: HOSPADM

## 2023-04-06 RX ORDER — ONDANSETRON 4 MG/1
4 TABLET, ORALLY DISINTEGRATING ORAL EVERY 8 HOURS PRN
Status: DISCONTINUED | OUTPATIENT
Start: 2023-04-06 | End: 2023-04-07 | Stop reason: HOSPADM

## 2023-04-06 RX ORDER — CLOPIDOGREL BISULFATE 75 MG/1
75 TABLET ORAL DAILY
Status: DISCONTINUED | OUTPATIENT
Start: 2023-04-06 | End: 2023-04-07 | Stop reason: HOSPADM

## 2023-04-06 RX ORDER — TAMSULOSIN HYDROCHLORIDE 0.4 MG/1
0.4 CAPSULE ORAL 2 TIMES DAILY
COMMUNITY

## 2023-04-06 RX ORDER — ONDANSETRON 2 MG/ML
4 INJECTION INTRAMUSCULAR; INTRAVENOUS EVERY 6 HOURS PRN
Status: DISCONTINUED | OUTPATIENT
Start: 2023-04-06 | End: 2023-04-07 | Stop reason: HOSPADM

## 2023-04-06 RX ORDER — CLOPIDOGREL BISULFATE 75 MG/1
75 TABLET ORAL DAILY
Status: CANCELLED | OUTPATIENT
Start: 2023-04-06

## 2023-04-06 RX ORDER — IPRATROPIUM BROMIDE AND ALBUTEROL SULFATE 2.5; .5 MG/3ML; MG/3ML
1 SOLUTION RESPIRATORY (INHALATION) EVERY 4 HOURS
Status: DISCONTINUED | OUTPATIENT
Start: 2023-04-06 | End: 2023-04-07 | Stop reason: HOSPADM

## 2023-04-06 RX ADMIN — RANOLAZINE 500 MG: 500 TABLET, EXTENDED RELEASE ORAL at 20:43

## 2023-04-06 RX ADMIN — TAMSULOSIN HYDROCHLORIDE 0.4 MG: 0.4 CAPSULE ORAL at 22:51

## 2023-04-06 RX ADMIN — MAGNESIUM SULFATE HEPTAHYDRATE 2000 MG: 40 INJECTION, SOLUTION INTRAVENOUS at 14:27

## 2023-04-06 RX ADMIN — IPRATROPIUM BROMIDE AND ALBUTEROL SULFATE 3 ML: 2.5; .5 SOLUTION RESPIRATORY (INHALATION) at 15:50

## 2023-04-06 RX ADMIN — ASPIRIN 325 MG: 325 TABLET ORAL at 10:00

## 2023-04-06 RX ADMIN — APIXABAN 5 MG: 5 TABLET, FILM COATED ORAL at 20:43

## 2023-04-06 RX ADMIN — METOPROLOL SUCCINATE 25 MG: 25 TABLET, EXTENDED RELEASE ORAL at 20:43

## 2023-04-06 RX ADMIN — IPRATROPIUM BROMIDE AND ALBUTEROL SULFATE 3 ML: 2.5; .5 SOLUTION RESPIRATORY (INHALATION) at 18:59

## 2023-04-06 RX ADMIN — SODIUM CHLORIDE, PRESERVATIVE FREE 10 ML: 5 INJECTION INTRAVENOUS at 20:47

## 2023-04-06 ASSESSMENT — LIFESTYLE VARIABLES
HOW OFTEN DO YOU HAVE A DRINK CONTAINING ALCOHOL: NEVER
HOW MANY STANDARD DRINKS CONTAINING ALCOHOL DO YOU HAVE ON A TYPICAL DAY: PATIENT DOES NOT DRINK
HOW MANY STANDARD DRINKS CONTAINING ALCOHOL DO YOU HAVE ON A TYPICAL DAY: PATIENT DOES NOT DRINK
HOW OFTEN DO YOU HAVE A DRINK CONTAINING ALCOHOL: NEVER

## 2023-04-06 ASSESSMENT — ENCOUNTER SYMPTOMS
SHORTNESS OF BREATH: 1
VOMITING: 0
NAUSEA: 0
COUGH: 0
SHORTNESS OF BREATH: 1
DIARRHEA: 0
CHOKING: 0
CHEST TIGHTNESS: 1

## 2023-04-06 ASSESSMENT — PAIN - FUNCTIONAL ASSESSMENT
PAIN_FUNCTIONAL_ASSESSMENT: NONE - DENIES PAIN
PAIN_FUNCTIONAL_ASSESSMENT: NONE - DENIES PAIN

## 2023-04-06 ASSESSMENT — EXERCISE STRESS TEST
PEAK_HR: 98
PEAK_BP: 122/68
PEAK_RPE: 16
PEAK_METS: 4.1

## 2023-04-06 NOTE — H&P
mmol/L    Alkaline Phosphatase 57 40 - 129 U/L    ALT 14 5 - 41 U/L    AST 16 <40 U/L    Total Bilirubin 0.4 0.3 - 1.2 mg/dL    Total Protein 6.2 (L) 6.4 - 8.3 g/dL    Albumin 3.9 3.5 - 5.2 g/dL   Troponin    Collection Time: 04/06/23  8:24 AM   Result Value Ref Range    Troponin, High Sensitivity 12 0 - 22 ng/L   Protime-INR    Collection Time: 04/06/23  8:24 AM   Result Value Ref Range    Protime 15.9 (H) 11.8 - 14.6 sec    INR 1.2    Magnesium    Collection Time: 04/06/23  8:24 AM   Result Value Ref Range    Magnesium 1.4 (L) 1.6 - 2.6 mg/dL   Brain Natriuretic Peptide    Collection Time: 04/06/23  8:24 AM   Result Value Ref Range    Pro- <300 pg/mL   COVID-19 & Influenza Combo    Collection Time: 04/06/23  8:32 AM    Specimen: Nasopharyngeal Swab   Result Value Ref Range    Specimen Description . NASOPHARYNGEAL SWAB     Source . NASOPHARYNGEAL SWAB     SARS-CoV-2 RNA, RT PCR Not Detected Not Detected    INFLUENZA A Not Detected Not Detected    INFLUENZA B Not Detected Not Detected       Imaging/Diagnostics:  XR CHEST PORTABLE    Result Date: 4/6/2023  EXAMINATION: ONE XRAY VIEW OF THE CHEST 4/6/2023 8:35 am COMPARISON: 11/16/2021 HISTORY: ORDERING SYSTEM PROVIDED HISTORY: cp, dyspnea TECHNOLOGIST PROVIDED HISTORY: cp, dyspnea Reason for Exam: cp, dyspnea FINDINGS: Left subclavian dual chamber pacer in place. The cardiomediastinal silhouette is unchanged in appearance. There is no consolidation, pneumothorax, or evidence of edema. No effusion is appreciated. The osseous structures are unchanged in appearance. Advanced arthropathy in the shoulders with surrounding soft tissue calcification again noted. Unchanged appearance of the chest without acute airspace disease identified.        Assessment :      Primary Problem  Chest pain    Active Hospital Problems    Diagnosis Date Noted    Chest pain [R07.9] 04/06/2023    SOB (shortness of breath) on exertion [R06.02] 04/06/2023       Plan:     Patient status

## 2023-04-06 NOTE — ED PROVIDER NOTES
without mention of complication, not stated as uncontrolled      Past Problem List  Patient Active Problem List   Diagnosis Code    Essential hypertension I10    Mixed hyperlipidemia E78.2    CAD (coronary artery disease) I25.10    Other emphysema (formerly [facility]) J43.8    Primary osteoarthritis involving multiple joints M15.9    Class 1 obesity in adult E66.9    Rotator cuff tear M75.100    Shoulder pain, bilateral M25.511, M25.512    Callus of foot L84    Type 2 diabetes mellitus without complication, without long-term current use of insulin (formerly [facility]) E11.9    Shoulder arthritis M19.019    RUQ pain R10.11    Atrial flutter (formerly [facility]) I48.92    Benign prostatic hyperplasia without lower urinary tract symptoms N40.0    Long term use of drug Z79.899    S/P placement of cardiac pacemaker Z95.0    Presence of stent in coronary artery in patient with coronary artery disease I25.10, Z95.5    Gastroesophageal reflux disease K21.9    Anxiety disorder F41.9    Tremor, unspecified R25.1    Community acquired pneumonia J18.9    Hypoxia R09.02    S/P angioplasty with stent Z95.820    Chest pain R07.9     SURGICAL HISTORY       Past Surgical History:   Procedure Laterality Date    CARDIAC CATHETERIZATION  05/17/2018    LAD PROX 50% / MID DIFFUSE 45-50% / LCX PROX 50% WITH PATENT RCA STENT    CARDIAC CATHETERIZATION  2011    CARDIAC CATHETERIZATION  02/16/2023    COLONOSCOPY      COLONOSCOPY  10/30/2020    COLONOSCOPY N/A 10/30/2020    COLORECTAL CANCER SCREENING, NOT HIGH RISK performed by Ale Brown MD at [address]  04/23/2009    Mid RCA (Liberte 3.5 x 32) Proximal RCA (4x20)    CORONARY ANGIOPLASTY WITH STENT PLACEMENT  04/25/2022    DR Daquan Wynn  / Successful PTCA-BERNADINE to mid RCA. / 40% proximal Mid LAD stenosis (same as 2019).     CORONARY ANGIOPLASTY WITH STENT PLACEMENT  02/16/2023    In-stent re-stenosis mid RCA underwent BERNADINE / Non-obstructive LAD and LCX disease unchanged from 2018 yes...

## 2023-04-06 NOTE — ED NOTES
Report given to Sebastián Sinclair from [employer]. Report method by phone   The following was reviewed with receiving RN:   Current vital signs:  /60   Pulse 64   Temp 98.4 °F (36.9 °C) (Oral)   Resp 14   Ht 5' 11\" (1.803 m)   Wt 223 lb 8 oz (101.4 kg)   SpO2 95%   BMI 31.17 kg/m²                MEWS Score: 1     Any medication or safety alerts were reviewed. Any pending diagnostics and notifications were also reviewed, as well as any safety concerns or issues, abnormal labs, abnormal imaging, and abnormal assessment findings. Questions were answered.          Colleen Frias RN  04/06/23 6809

## 2023-04-06 NOTE — PROGRESS NOTES
Medication History completed:    New medications: albuterol nebulizer solution    Medications discontinued: none    Medications flagged for review:   Omeprazole - patient reports he was instructed to stop taking this medication by his cardiologist  Jolie - now takes albuterol nebulizer solution  Acetaminophen - no longer taking    Changes to dosing:   Varenicline changed to 1 mg daily - patient reports only taking in the morning because taking before bed causes nightmares  Tamsulosin changed to 0.4 mg twice daily  Repatha is given on the 1st and 15th of each month  Metformin - the patient is prescribed 500 mg in the morning and 1000 mg nightly, but is only taking 500 mg twice daily    Stated allergies: As listed    Other pertinent information: Medications confirmed with [facility]. The patient reports difficulty with copays for his albuterol nebulizer solution.      Thank you,  Ignacio Maria, PharmD, BCPS  [phone]

## 2023-04-06 NOTE — PROGRESS NOTES
Pt arrived to floor from ED to room 2108. Vitals taken and telemetry applied. No distress noted. Call light within reach, and pt educated on its use. Denied further questions or needs at this time. Will continue to monitor.

## 2023-04-06 NOTE — PROGRESS NOTES
Pt participated in cardiac rehab session. Upon arrival he stated he did stop taking Lisinopril and did not take it yesterday evening or this morning. He stated he did feel better and did not feel light-headed. BP was better this am at 120/80, HR 86 SPO2 96%RA. He continued to feel SOB with exertion with no changes. He reports he continues to not use inhalers as advised. Pt reported feelings of light-headedness after use of Ergometer. I had him sit down BP was 122/68, HR 98, SPO2 95%RA. Pt was assisted to chair and feeling of light-headedness passed. I then tells me he also had an episode of chest pain yesterday lasting got a few minutes that was alleviated with Nitro. I advised that he should go to the ER for evaluation but he declined. I also dicussed my pulmonary concerns and that I wanted to him to follow-up with his Pulmonologist asap. I gave him the phone number for Dr.N Lowell Chau. Pt completes Cardiac rehab session without further event and vitals were 102/64, 76 upon departure.

## 2023-04-06 NOTE — ED PROVIDER NOTES
SCREENING, NOT HIGH RISK performed by Elena Shrestha MD at [address]  04/23/2009    Mid RCA (Liberte 3.5 x 32) Proximal RCA (4x20)    CORONARY ANGIOPLASTY WITH STENT PLACEMENT  04/25/2022    DR Denny Hamilton  / Successful PTCA-BERNADINE to mid RCA. / 40% proximal Mid LAD stenosis (same as 2019). CORONARY ANGIOPLASTY WITH STENT PLACEMENT  02/16/2023    In-stent re-stenosis mid RCA underwent BERNADINE / Non-obstructive LAD and LCX disease unchanged from 2018    PACEMAKER INSERTION  07/12/2019    Dr. Nikki Berg  /  Neeta Kelsey       Previous Medications    ACETAMINOPHEN (TYLENOL) 650 MG EXTENDED RELEASE TABLET    Take 650 mg by mouth every 8 hours as needed    ALBUTEROL (PROVENTIL) (2.5 MG/3ML) 0.083% NEBULIZER SOLUTION    Take 3 mLs by nebulization every 6 hours as needed for Wheezing    ALBUTEROL SULFATE  (90 BASE) MCG/ACT INHALER    Inhale 2 puffs into the lungs every 6 hours as needed for Shortness of Breath    APIXABAN (ELIQUIS) 5 MG TABS TABLET    Take 1 tablet by mouth 2 times daily    BLOOD GLUCOSE MONITORING SUPPL (TRUE METRIX AIR GLUCOSE METER) W/DEVICE KIT    1 kit by Does not apply route 2 times daily    BLOOD GLUCOSE TEST STRIPS (ASCENSIA AUTODISC VI;ONE TOUCH ULTRA TEST VI) STRIP    1 each by Other route 2 times daily As needed.     BLOOD PRESSURE MONITORING (BLOOD PRESSURE CUFF) MISC    1 each by Does not apply route daily as needed (monitoring)    CLOPIDOGREL (PLAVIX) 75 MG TABLET    Take 1 tablet by mouth daily    FLUTICASONE-SALMETEROL (ADVAIR) 250-50 MCG/ACT AEPB DISKUS INHALER    Inhale 1 puff into the lungs every 12 hours    GLUCOSE BLOOD (BLOOD GLUCOSE TEST STRIPS) STRP    Testing qd -bid prn    IPRATROPIUM-ALBUTEROL (DUONEB) 0.5-2.5 (3) MG/3ML SOLN NEBULIZER SOLUTION    Inhale 3 mLs into the lungs every 4 hours    ISOSORBIDE MONONITRATE (IMDUR) 30 MG EXTENDED RELEASE TABLET    take 1 tablet by mouth once daily    METFORMIN

## 2023-04-07 VITALS
OXYGEN SATURATION: 95 % | RESPIRATION RATE: 18 BRPM | DIASTOLIC BLOOD PRESSURE: 68 MMHG | TEMPERATURE: 97.4 F | SYSTOLIC BLOOD PRESSURE: 138 MMHG | HEIGHT: 71 IN | HEART RATE: 72 BPM | WEIGHT: 229.5 LBS | BODY MASS INDEX: 32.13 KG/M2

## 2023-04-07 PROBLEM — R55 NEAR SYNCOPE: Status: ACTIVE | Noted: 2023-04-07

## 2023-04-07 LAB
ABSOLUTE EOS #: 0.2 K/UL (ref 0–0.4)
ABSOLUTE LYMPH #: 2.1 K/UL (ref 1–4.8)
ABSOLUTE MONO #: 0.7 K/UL (ref 0.1–1.3)
ANION GAP SERPL CALCULATED.3IONS-SCNC: 8 MMOL/L (ref 9–17)
BASOPHILS # BLD: 0 % (ref 0–2)
BASOPHILS ABSOLUTE: 0 K/UL (ref 0–0.2)
BUN SERPL-MCNC: 11 MG/DL (ref 8–23)
CALCIUM SERPL-MCNC: 8.2 MG/DL (ref 8.6–10.4)
CHLORIDE SERPL-SCNC: 108 MMOL/L (ref 98–107)
CO2 SERPL-SCNC: 24 MMOL/L (ref 20–31)
CREAT SERPL-MCNC: 0.72 MG/DL (ref 0.7–1.2)
EOSINOPHILS RELATIVE PERCENT: 4 % (ref 0–4)
GFR SERPL CREATININE-BSD FRML MDRD: >60 ML/MIN/1.73M2
GLUCOSE BLD-MCNC: 167 MG/DL (ref 75–110)
GLUCOSE SERPL-MCNC: 125 MG/DL (ref 70–99)
HCT VFR BLD AUTO: 42.6 % (ref 41–53)
HGB BLD-MCNC: 14.3 G/DL (ref 13.5–17.5)
LYMPHOCYTES # BLD: 34 % (ref 24–44)
MCH RBC QN AUTO: 30.1 PG (ref 26–34)
MCHC RBC AUTO-ENTMCNC: 33.6 G/DL (ref 31–37)
MCV RBC AUTO: 89.3 FL (ref 80–100)
MONOCYTES # BLD: 11 % (ref 1–7)
PDW BLD-RTO: 14.7 % (ref 11.5–14.9)
PLATELET # BLD AUTO: 153 K/UL (ref 150–450)
PMV BLD AUTO: 7.7 FL (ref 6–12)
POTASSIUM SERPL-SCNC: 3.7 MMOL/L (ref 3.7–5.3)
RBC # BLD: 4.76 M/UL (ref 4.5–5.9)
SEG NEUTROPHILS: 51 % (ref 36–66)
SEGMENTED NEUTROPHILS ABSOLUTE COUNT: 3.2 K/UL (ref 1.3–9.1)
SODIUM SERPL-SCNC: 140 MMOL/L (ref 135–144)
TROPONIN I SERPL DL<=0.01 NG/ML-MCNC: 10 NG/L (ref 0–22)
WBC # BLD AUTO: 6.3 K/UL (ref 3.5–11)

## 2023-04-07 PROCEDURE — 6370000000 HC RX 637 (ALT 250 FOR IP)

## 2023-04-07 PROCEDURE — 36415 COLL VENOUS BLD VENIPUNCTURE: CPT

## 2023-04-07 PROCEDURE — 80048 BASIC METABOLIC PNL TOTAL CA: CPT

## 2023-04-07 PROCEDURE — 2580000003 HC RX 258

## 2023-04-07 PROCEDURE — G0378 HOSPITAL OBSERVATION PER HR: HCPCS

## 2023-04-07 PROCEDURE — 85025 COMPLETE CBC W/AUTO DIFF WBC: CPT

## 2023-04-07 PROCEDURE — 94640 AIRWAY INHALATION TREATMENT: CPT

## 2023-04-07 PROCEDURE — 99239 HOSP IP/OBS DSCHRG MGMT >30: CPT | Performed by: INTERNAL MEDICINE

## 2023-04-07 PROCEDURE — 84484 ASSAY OF TROPONIN QUANT: CPT

## 2023-04-07 PROCEDURE — 82947 ASSAY GLUCOSE BLOOD QUANT: CPT

## 2023-04-07 RX ORDER — ISOSORBIDE MONONITRATE 30 MG/1
30 TABLET, EXTENDED RELEASE ORAL DAILY
Status: DISCONTINUED | OUTPATIENT
Start: 2023-04-07 | End: 2023-04-07 | Stop reason: HOSPADM

## 2023-04-07 RX ADMIN — VARENICLINE TARTRATE 1 MG: 1 TABLET, FILM COATED ORAL at 10:05

## 2023-04-07 RX ADMIN — IPRATROPIUM BROMIDE AND ALBUTEROL SULFATE 3 ML: 2.5; .5 SOLUTION RESPIRATORY (INHALATION) at 00:16

## 2023-04-07 RX ADMIN — IPRATROPIUM BROMIDE AND ALBUTEROL SULFATE 3 ML: 2.5; .5 SOLUTION RESPIRATORY (INHALATION) at 12:46

## 2023-04-07 RX ADMIN — IPRATROPIUM BROMIDE AND ALBUTEROL SULFATE 3 ML: 2.5; .5 SOLUTION RESPIRATORY (INHALATION) at 07:41

## 2023-04-07 RX ADMIN — CLOPIDOGREL BISULFATE 75 MG: 75 TABLET ORAL at 10:06

## 2023-04-07 RX ADMIN — ISOSORBIDE MONONITRATE 30 MG: 30 TABLET, EXTENDED RELEASE ORAL at 10:50

## 2023-04-07 RX ADMIN — RANOLAZINE 500 MG: 500 TABLET, EXTENDED RELEASE ORAL at 10:06

## 2023-04-07 RX ADMIN — APIXABAN 5 MG: 5 TABLET, FILM COATED ORAL at 10:05

## 2023-04-07 RX ADMIN — SODIUM CHLORIDE, PRESERVATIVE FREE 10 ML: 5 INJECTION INTRAVENOUS at 10:08

## 2023-04-07 ASSESSMENT — ENCOUNTER SYMPTOMS
SHORTNESS OF BREATH: 0
GASTROINTESTINAL NEGATIVE: 1
SINUS PRESSURE: 0
EYE ITCHING: 0
WHEEZING: 1
DIARRHEA: 0
EYE PAIN: 0
NAUSEA: 0
ABDOMINAL DISTENTION: 0
ABDOMINAL PAIN: 0
COUGH: 0
EYES NEGATIVE: 1
EYE DISCHARGE: 0
SINUS PAIN: 0
VOMITING: 0
CHEST TIGHTNESS: 0

## 2023-04-07 NOTE — DISCHARGE SUMMARY
[address]    Discharge Summary     Patient ID: Mary Jo Lane  :  1950   MRN: [id number]     ACCOUNT:  [de-identified]   Patient's PCP: CHRISTIANA Portillo CNP  Admit Date: 2023   Discharge Date: 2023     Length of Stay: 1  Code Status:  Prior  Admitting Physician: Katey Obando MD  Discharge Physician: Julieta Roberts MD     Active Discharge Diagnoses:       Primary Problem  Chest pain      [place] Problems    Diagnosis Date Noted    S/P placement of cardiac pacemaker [Z95.0] 2019     Priority: High    S/P angioplasty with stent [Z95.820] 2022     Priority: Medium    Near syncope [R55] 2023    Chest pain [R07.9] 2023    SOB (shortness of breath) on exertion [R06.02] 2023    Atrial flutter ([address] [I48.92] 2018    Type 2 diabetes mellitus without complication, without long-term current use of insulin ([address] [E11.9] 2011    CAD (coronary artery disease) [I25.10]     Other emphysema ([address] [J43.8]     Essential hypertension [I10]        Admission Condition:  poor     Discharged Condition: stable    Hospital Stay:       Hospital Course:  Mary Jo Lane is a 67 y.o. male who was admitted for the management of   Chest pain , presented to ER with Shortness of Breath (PT returned to ED after leaving AMA /Pt was in cardiac rehab- felt SOB when leaving and felt pre-syncopal/Pt was due to be admitted- states he was told he would be responsible for covering all his medications during admission/Pt stating severe financial strain and due to this he decided it would be in his best interest go home and obtain his medications./Pt denies any changes since departure. /Appears in NAD)  Past medical history of CAD status post stent, COPD/emphysema not on home oxygen, a flutter on Eliquis, bilateral carotid artery stenosis, hypertension, and hyperlipidemia.   He presented from

## 2023-04-07 NOTE — CARE COORDINATION
Case Management Assessment  Initial Evaluation    Date/Time of Evaluation: 4/7/2023 10:21 AM  Assessment Completed by: Shravan Culver RN    If patient is discharged prior to next notation, then this note serves as note for discharge by case management. Patient Name: Garth Ashford                   YOB: 1950  Diagnosis: Hypomagnesemia [E83.42]  Chest pain [R07.9]  SOB (shortness of breath) on exertion [R06.02]  Chest pain, unspecified type [R07.9]                   Date / Time: 4/6/2023 11:37 AM    Patient Admission Status: Inpatient   Readmission Risk (Low < 19, Mod (19-27), High > 27): Readmission Risk Score: 13    Current PCP: CHRISTIANA Fisher CNP  PCP verified by CM? Yes    Chart Reviewed: Yes      History Provided by: Patient  Patient Orientation: Alert and Oriented    Patient Cognition: Alert    Hospitalization in the last 30 days (Readmission):  No    If yes, Readmission Assessment in CM Navigator will be completed. Advance Directives:      Code Status: Full Code   Patient's Primary Decision Maker is: Legal Next of Kin      Discharge Planning:    Patient lives with: Spouse/Significant Other Type of Home: House  Primary Care Giver: Self  Patient Support Systems include: Spouse/Significant Other   Current Financial resources: Medicare, Medicaid  Current community resources: None  Current services prior to admission: Durable Medical Equipment            Current DME: Home Aerosol, Glucometer, Cane            Type of Home Care services:  None    ADLS  Prior functional level: Independent in ADLs/IADLs  Current functional level: Independent in ADLs/IADLs    PT AM-PAC:   /24  OT AM-PAC:   /24    Family can provide assistance at DC: No  Would you like Case Management to discuss the discharge plan with any other family members/significant others, and if so, who?  No  Plans to Return to Present Housing: Yes  Other Identified Issues/Barriers to RETURNING to current housing: No barriers

## 2023-04-07 NOTE — PROGRESS NOTES
..Discharge teaching and instructions for diagnosis of chest pain completed with patient using teachback method. AVS reviewed. Printed prescriptions given to patient. Patient voiced understanding regarding prescriptions, follow up appointments, and care of self at home.  Discharged ambulatory to  home with support per self

## 2023-04-07 NOTE — PROGRESS NOTES
[address] [employer]    PROGRESS NOTE             4/7/2023    8:45 AM    Name:   Eleanor Vargas  MRN:     [id number]     Acct:      [de-identified]   Room:   [facility] Day:  1  Admit Date:  4/6/2023 11:37 AM    PCP:  CHRISTIANA Burrows CNP  Code Status:  Full Code    Subjective:     C/C:   Chief Complaint   Patient presents with    Shortness of Breath     PT returned to ED after leaving AMA   Pt was in cardiac rehab- felt SOB when leaving and felt pre-syncopal  Pt was due to be admitted- states he was told he would be responsible for covering all his medications during admission  Pt stating severe financial strain and due to this he decided it would be in his best interest go home and obtain his medications. Pt denies any changes since departure. Appears in NAD     Interval History Status: improved. Patient seen and examined at bedside. No acute events overnight. Patient reports that he is feeling much better. He has been walking around the unit and his shortness of breath is much improved. He does report some continued wheezing which he says is baseline for him. No current chest pain. No longer has dizziness. Orthostatics were negative yesterday. Brief History:     The patient is a 67 y.o. Non- / non  male who presents withShortness of Breath (PT returned to ED after leaving AMA /Pt was in cardiac rehab- felt SOB when leaving and felt pre-syncopal/Pt was due to be admitted- states he was told he would be responsible for covering all his medications during admission/Pt stating severe financial strain and due to this he decided it would be in his best interest go home and obtain his medications./Pt denies any changes since departure. /Appears in NAD)   and he is admitted to the hospital for the management of chest pain and syncopal episode.      Patient is a 68-year-old male past medical history significant for

## 2023-04-07 NOTE — ACP (ADVANCE CARE PLANNING)
[] Schedule follow-up conversation to continue planning  [x] Referred individual to Provider for additional questions/concerns   [] Advised patient/agent/surrogate to review completed ACP document and update if needed with changes in condition, patient preferences or care setting    [] This note routed to one or more involved healthcare providers

## 2023-04-07 NOTE — CONSULTS
increased work of breathing. On auscultation: clear  Cardiovascular:  Heart tones are crisp and normal. regular S1 and S2. Murmurs: none  Jugular venous pulsation Normal  Abdomen:  No masses or tenderness  Bowel sounds present  Extremities:   No Cyanosis or Clubbing   Lower extremity edema: no   Skin: Warm and dry  Neurological:  Alert and oriented. Moves all extremities well  No abnormalities of mood, affect, memory, mentation, or behavior are noted    DATA:    Diagnostics:      EKG:   Results for orders placed or performed during the hospital encounter of 04/06/23   EKG 12 Lead   Result Value Ref Range    Ventricular Rate 66 BPM    Atrial Rate 66 BPM    QRS Duration 136 ms    Q-T Interval 432 ms    QTc Calculation (Bazett) 452 ms    P Axis 51 degrees    R Axis -72 degrees    T Axis 98 degrees    Narrative    Ventricular-paced rhythm  Abnormal ECG  When compared with ECG of 14-NOV-2021 09:06,  Vent.  rate has decreased BY  12 BPM       Echo:  Results for orders placed or performed during the hospital encounter of 02/28/22   ECHO Complete 2D W Doppler W Color   Result Value Ref Range    Left Ventricular Ejection Fraction 53     LVEF MODALITY ECHO     Narrative    [address] [facility]    Transthoracic Echocardiography Report (TTE)     Patient Name [address] [place] [address]      Date of Study               02/28/2022                Isaac Montes De Oca A      Date of      1950  Gender                      Male   Birth      Age          70 year(s)  Race                              Room Number              Height:                     71 inch, 180.34 cm      Corporate ID [id number]    Weight:                     225 pounds, 102.1 kg   #      Patient Acct [de-identified]   BSA:          2.22 m^2      BMI:      31.38   #                                                              kg/m^2      MR #         [id number]      Sonographer                 Cassandra Rhodes      Accession #  [id number]  Interpreting Physician      Daniela Carter

## 2023-04-07 NOTE — PLAN OF CARE
Safety maintained, call light is within reach, side rails are up x2, bed is low/locked, no s/s or c/o distress  Problem: Discharge Planning  Goal: Discharge to home or other facility with appropriate resources  4/7/2023 8245 by Ela López RN  Outcome: Progressing  4/6/2023 1704 by Gavin Galeas RN  Outcome: Progressing     Problem: ABCDS Injury Assessment  Goal: Absence of physical injury  4/7/2023 3046 by Ela López RN  Outcome: Progressing  4/6/2023 1704 by Gavin Galeas RN  Outcome: Progressing     Problem: Safety - Adult  Goal: Free from fall injury  4/7/2023 7177 by Ela López RN  Outcome: Progressing  4/6/2023 1704 by Gavin Galeas RN  Outcome: Progressing

## 2023-04-07 NOTE — PLAN OF CARE
Problem: Discharge Planning  Goal: Discharge to home or other facility with appropriate resources  4/7/2023 1336 by Trevin Ramon RN  Outcome: Adequate for Discharge  4/7/2023 9626 by Wade Kwok RN  Outcome: Progressing     Problem: ABCDS Injury Assessment  Goal: Absence of physical injury  4/7/2023 1336 by Trevin Ramon RN  Outcome: Adequate for Discharge  4/7/2023 7973 by Wade Kwok RN  Outcome: Progressing     Problem: Safety - Adult  Goal: Free from fall injury  4/7/2023 1336 by Trevin Ramon RN  Outcome: Adequate for Discharge  4/7/2023 3285 by Wade Kwok RN  Outcome: Progressing     Problem: Chronic Conditions and Co-morbidities  Goal: Patient's chronic conditions and co-morbidity symptoms are monitored and maintained or improved  Outcome: Adequate for Discharge

## 2023-04-08 ENCOUNTER — HOSPITAL ENCOUNTER (EMERGENCY)
Age: 73
Discharge: HOME | End: 2023-04-08
Attending: EMERGENCY MEDICINE
Payer: MEDICARE

## 2023-04-08 ENCOUNTER — APPOINTMENT (OUTPATIENT)
Dept: GENERAL RADIOLOGY | Age: 73
End: 2023-04-08
Payer: MEDICARE

## 2023-04-08 VITALS
DIASTOLIC BLOOD PRESSURE: 70 MMHG | OXYGEN SATURATION: 97 % | TEMPERATURE: 97.5 F | SYSTOLIC BLOOD PRESSURE: 123 MMHG | RESPIRATION RATE: 11 BRPM | HEIGHT: 71 IN | HEART RATE: 61 BPM | BODY MASS INDEX: 32.06 KG/M2 | WEIGHT: 229 LBS

## 2023-04-08 DIAGNOSIS — R06.02 SHORTNESS OF BREATH: Primary | ICD-10-CM

## 2023-04-08 DIAGNOSIS — K59.00 CONSTIPATION, UNSPECIFIED CONSTIPATION TYPE: ICD-10-CM

## 2023-04-08 LAB
ABSOLUTE EOS #: 0.3 K/UL (ref 0–0.4)
ABSOLUTE LYMPH #: 2.4 K/UL (ref 1–4.8)
ABSOLUTE MONO #: 0.8 K/UL (ref 0.1–1.3)
ALBUMIN SERPL-MCNC: 4.1 G/DL (ref 3.5–5.2)
ALP SERPL-CCNC: 57 U/L (ref 40–129)
ALT SERPL-CCNC: 15 U/L (ref 5–41)
ANION GAP SERPL CALCULATED.3IONS-SCNC: 12 MMOL/L (ref 9–17)
AST SERPL-CCNC: 15 U/L
BASOPHILS # BLD: 1 % (ref 0–2)
BASOPHILS ABSOLUTE: 0.1 K/UL (ref 0–0.2)
BILIRUB SERPL-MCNC: 0.5 MG/DL (ref 0.3–1.2)
BNP SERPL-MCNC: 199 PG/ML
BUN SERPL-MCNC: 11 MG/DL (ref 8–23)
CALCIUM SERPL-MCNC: 9.1 MG/DL (ref 8.6–10.4)
CARBOXYHEMOGLOBIN: 2.8 % (ref 0–5)
CHLORIDE SERPL-SCNC: 104 MMOL/L (ref 98–107)
CO2 SERPL-SCNC: 22 MMOL/L (ref 20–31)
CREAT SERPL-MCNC: 0.64 MG/DL (ref 0.7–1.2)
EKG ATRIAL RATE: 80 BPM
EKG Q-T INTERVAL: 360 MS
EKG QRS DURATION: 80 MS
EKG QTC CALCULATION (BAZETT): 423 MS
EKG R AXIS: 81 DEGREES
EKG T AXIS: -60 DEGREES
EKG VENTRICULAR RATE: 83 BPM
EOSINOPHILS RELATIVE PERCENT: 3 % (ref 0–4)
FLUAV RNA RESP QL NAA+PROBE: NOT DETECTED
FLUBV RNA RESP QL NAA+PROBE: NOT DETECTED
GFR SERPL CREATININE-BSD FRML MDRD: >60 ML/MIN/1.73M2
GLUCOSE BLD-MCNC: 137 MG/DL (ref 75–110)
GLUCOSE SERPL-MCNC: 142 MG/DL (ref 70–99)
HCO3 VENOUS: 24.9 MMOL/L (ref 24–30)
HCT VFR BLD AUTO: 46.8 % (ref 41–53)
HGB BLD-MCNC: 15.6 G/DL (ref 13.5–17.5)
LYMPHOCYTES # BLD: 28 % (ref 24–44)
MCH RBC QN AUTO: 29.4 PG (ref 26–34)
MCHC RBC AUTO-ENTMCNC: 33.4 G/DL (ref 31–37)
MCV RBC AUTO: 88.2 FL (ref 80–100)
METHEMOGLOBIN: 0.4 % (ref 0–1.9)
MONOCYTES # BLD: 9 % (ref 1–7)
O2 DEVICE/FLOW/%: ABNORMAL
O2 SAT, VEN: 89.2 % (ref 60–85)
PATIENT TEMP: 37
PCO2, VEN: 36.9 MM HG (ref 39–55)
PDW BLD-RTO: 14.6 % (ref 11.5–14.9)
PH VENOUS: 7.44 (ref 7.32–7.42)
PLATELET # BLD AUTO: 197 K/UL (ref 150–450)
PMV BLD AUTO: 7.5 FL (ref 6–12)
PO2, VEN: 56.9 MM HG (ref 30–50)
POSITIVE BASE EXCESS, VEN: 0.7 MMOL/L (ref 0–2)
POTASSIUM SERPL-SCNC: 4 MMOL/L (ref 3.7–5.3)
PROT SERPL-MCNC: 6.6 G/DL (ref 6.4–8.3)
RBC # BLD: 5.31 M/UL (ref 4.5–5.9)
SARS-COV-2 RNA RESP QL NAA+PROBE: NOT DETECTED
SEG NEUTROPHILS: 59 % (ref 36–66)
SEGMENTED NEUTROPHILS ABSOLUTE COUNT: 5.1 K/UL (ref 1.3–9.1)
SODIUM SERPL-SCNC: 138 MMOL/L (ref 135–144)
SOURCE: NORMAL
SPECIMEN DESCRIPTION: NORMAL
TROPONIN I SERPL DL<=0.01 NG/ML-MCNC: 10 NG/L (ref 0–22)
TROPONIN I SERPL DL<=0.01 NG/ML-MCNC: 8 NG/L (ref 0–22)
WBC # BLD AUTO: 8.6 K/UL (ref 3.5–11)

## 2023-04-08 PROCEDURE — 84484 ASSAY OF TROPONIN QUANT: CPT

## 2023-04-08 PROCEDURE — 93005 ELECTROCARDIOGRAM TRACING: CPT | Performed by: EMERGENCY MEDICINE

## 2023-04-08 PROCEDURE — 36415 COLL VENOUS BLD VENIPUNCTURE: CPT

## 2023-04-08 PROCEDURE — 71045 X-RAY EXAM CHEST 1 VIEW: CPT

## 2023-04-08 PROCEDURE — 85025 COMPLETE CBC W/AUTO DIFF WBC: CPT

## 2023-04-08 PROCEDURE — 82805 BLOOD GASES W/O2 SATURATION: CPT

## 2023-04-08 PROCEDURE — 87636 SARSCOV2 & INF A&B AMP PRB: CPT

## 2023-04-08 PROCEDURE — 6370000000 HC RX 637 (ALT 250 FOR IP): Performed by: EMERGENCY MEDICINE

## 2023-04-08 PROCEDURE — 83880 ASSAY OF NATRIURETIC PEPTIDE: CPT

## 2023-04-08 PROCEDURE — 94640 AIRWAY INHALATION TREATMENT: CPT

## 2023-04-08 PROCEDURE — 94761 N-INVAS EAR/PLS OXIMETRY MLT: CPT

## 2023-04-08 PROCEDURE — 99285 EMERGENCY DEPT VISIT HI MDM: CPT

## 2023-04-08 PROCEDURE — 80053 COMPREHEN METABOLIC PANEL: CPT

## 2023-04-08 RX ORDER — IPRATROPIUM BROMIDE AND ALBUTEROL SULFATE 2.5; .5 MG/3ML; MG/3ML
1 SOLUTION RESPIRATORY (INHALATION) ONCE
Status: COMPLETED | OUTPATIENT
Start: 2023-04-08 | End: 2023-04-08

## 2023-04-08 RX ORDER — POLYETHYLENE GLYCOL 3350 17 G/17G
17 POWDER, FOR SOLUTION ORAL DAILY
Qty: 116 G | Refills: 0 | Status: SHIPPED | OUTPATIENT
Start: 2023-04-08 | End: 2023-04-15

## 2023-04-08 RX ADMIN — IPRATROPIUM BROMIDE AND ALBUTEROL SULFATE 1 AMPULE: 2.5; .5 SOLUTION RESPIRATORY (INHALATION) at 10:33

## 2023-04-08 ASSESSMENT — ENCOUNTER SYMPTOMS
SHORTNESS OF BREATH: 1
CONSTIPATION: 1
COUGH: 1
ABDOMINAL PAIN: 0
BACK PAIN: 0

## 2023-04-08 ASSESSMENT — LIFESTYLE VARIABLES
HOW MANY STANDARD DRINKS CONTAINING ALCOHOL DO YOU HAVE ON A TYPICAL DAY: PATIENT DOES NOT DRINK
HOW OFTEN DO YOU HAVE A DRINK CONTAINING ALCOHOL: NEVER

## 2023-04-08 ASSESSMENT — PAIN DESCRIPTION - ORIENTATION: ORIENTATION: LEFT

## 2023-04-08 ASSESSMENT — PAIN SCALES - GENERAL: PAINLEVEL_OUTOF10: 3

## 2023-04-08 ASSESSMENT — PAIN DESCRIPTION - LOCATION: LOCATION: ABDOMEN

## 2023-04-08 ASSESSMENT — PAIN - FUNCTIONAL ASSESSMENT: PAIN_FUNCTIONAL_ASSESSMENT: 0-10

## 2023-04-08 NOTE — ED TRIAGE NOTES
Mode of arrival (squad #, walk in, police, etc) : walk in        Chief complaint(s): shortness of breath        Arrival Note (brief scenario, treatment PTA, etc). : Patient complaining of shortness of breath and feeling being bloated        C= \"Have you ever felt that you should Cut down on your drinking? \"  No  A= \"Have people Annoyed you by criticizing your drinking? \"  No  G= \"Have you ever felt bad or Guilty about your drinking? \"  No  E= \"Have you ever had a drink as an Eye-opener first thing in the morning to steady your nerves or to help a hangover? \"  No      Deferred []      Reason for deferring: N/A    *If yes to two or more: probable alcohol abuse. *

## 2023-04-08 NOTE — ED PROVIDER NOTES
(gastroesophageal reflux disease)     H/O cardiovascular stress test 09/2017    H/O non-A, non-B viral hepatitis     Hyperlipidemia     Hypertension     Marijuana use     MI (myocardial infarction) [address]     Obesity     Osteoarthritis     Pneumonia     Poor compliance     Rotator cuff tear     bilat    Shoulder pain, bilateral     Type II or unspecified type diabetes mellitus without mention of complication, not stated as uncontrolled        SURGICAL HISTORY       Past Surgical History:   Procedure Laterality Date    CARDIAC CATHETERIZATION  05/17/2018    LAD PROX 50% / MID DIFFUSE 45-50% / LCX PROX 50% WITH PATENT RCA STENT    CARDIAC CATHETERIZATION  2011    CARDIAC CATHETERIZATION  02/16/2023    COLONOSCOPY      COLONOSCOPY  10/30/2020    COLONOSCOPY N/A 10/30/2020    COLORECTAL CANCER SCREENING, NOT HIGH RISK performed by Deng Edgar MD at [address]  04/23/2009    Mid RCA (Liberte 3.5 x 32) Proximal RCA (4x20)    CORONARY ANGIOPLASTY WITH STENT PLACEMENT  04/25/2022    DR Elena Contreras  / Successful PTCA-BERNADINE to mid RCA. / 40% proximal Mid LAD stenosis (same as 2019). CORONARY ANGIOPLASTY WITH STENT PLACEMENT  02/16/2023    In-stent re-stenosis mid RCA underwent BERNADINE / Non-obstructive LAD and LCX disease unchanged from 2018    PACEMAKER INSERTION  07/12/2019    Dr. Mariposa Ventura  /  Che Ibrahim       Discharge Medication List as of 4/8/2023 10:56 AM        CONTINUE these medications which have NOT CHANGED    Details   omeprazole (PRILOSEC) 20 MG delayed release capsule Take 1 capsule by mouth dailyHistorical Med      tamsulosin (FLOMAX) 0.4 MG capsule Take 1 capsule by mouth in the morning and at bedtimeHistorical Med      fluticasone-salmeterol (ADVAIR) 250-50 MCG/ACT AEPB diskus inhaler Inhale 1 puff into the lungs in the morning and 1 puff in the evening. Historical Med      isosorbide mononitrate (IMDUR) 30 MG extended release

## 2023-04-08 NOTE — ED NOTES
Received call from [facility] ([employer]), 2 PHI verified. Patient on sinus rhythm right now, no arrhythmia noted since January 2023. Pacemaker's lead and battery working normal. Patient's pacemaker is on demand, atrial paced about 68% and ventricular paced about 32%. [facility] will send results via fax.          Salvador Muñiz RN  04/08/23 8993

## 2023-04-11 LAB
EKG ATRIAL RATE: 80 BPM
EKG Q-T INTERVAL: 360 MS
EKG QRS DURATION: 80 MS
EKG QTC CALCULATION (BAZETT): 423 MS
EKG R AXIS: 81 DEGREES
EKG T AXIS: -60 DEGREES
EKG VENTRICULAR RATE: 83 BPM

## 2023-04-11 PROCEDURE — 93010 ELECTROCARDIOGRAM REPORT: CPT | Performed by: INTERNAL MEDICINE

## 2023-04-19 ENCOUNTER — HOSPITAL ENCOUNTER (OUTPATIENT)
Dept: CARDIAC REHAB | Age: 73
Setting detail: THERAPIES SERIES
Discharge: HOME | End: 2023-04-19
Payer: MEDICARE

## 2023-04-19 VITALS — BODY MASS INDEX: 31.94 KG/M2 | WEIGHT: 229 LBS

## 2023-04-19 PROCEDURE — 93798 PHYS/QHP OP CAR RHAB W/ECG: CPT

## 2023-04-19 ASSESSMENT — EXERCISE STRESS TEST
PEAK_HR: 85
PEAK_METS: 4.7
PEAK_RPE: 14
PEAK_BP: 124/78

## 2023-04-20 ENCOUNTER — HOSPITAL ENCOUNTER (OUTPATIENT)
Dept: CARDIAC REHAB | Age: 73
Setting detail: THERAPIES SERIES
Discharge: HOME | End: 2023-04-20
Payer: MEDICARE

## 2023-04-20 VITALS — BODY MASS INDEX: 31.69 KG/M2 | WEIGHT: 227.2 LBS

## 2023-04-20 PROCEDURE — 93798 PHYS/QHP OP CAR RHAB W/ECG: CPT

## 2023-04-20 ASSESSMENT — EXERCISE STRESS TEST
PEAK_RPE: 16
PEAK_BP: 132/68
PEAK_METS: 4.2
PEAK_HR: 89

## 2023-04-20 NOTE — PROGRESS NOTES
Pt complains of increased SOB with activity while in cardiac Rehab. SPO2 95%RA upon arrival. SPO2 94% after completion. He did have to take occasional breaks during exercise. Lungs clear. Pt reports not using his inhaler this am which was in his vehicle. He declined assistance. He is encouraged to use his inhalers regularly and to keep this with him at all times. He is advised to seek further care and evaluation if SOB worsens.

## 2023-04-24 ENCOUNTER — HOSPITAL ENCOUNTER (OUTPATIENT)
Dept: CARDIAC REHAB | Age: 73
Setting detail: THERAPIES SERIES
Discharge: HOME | End: 2023-04-24
Payer: MEDICARE

## 2023-04-25 ENCOUNTER — HOSPITAL ENCOUNTER (OUTPATIENT)
Dept: CARDIAC REHAB | Age: 73
Setting detail: THERAPIES SERIES
Discharge: HOME | End: 2023-04-25
Payer: MEDICARE

## 2023-04-25 VITALS — WEIGHT: 224.7 LBS | BODY MASS INDEX: 31.34 KG/M2

## 2023-04-25 PROCEDURE — 93798 PHYS/QHP OP CAR RHAB W/ECG: CPT

## 2023-04-25 ASSESSMENT — EXERCISE STRESS TEST
PEAK_RPE: 15
PEAK_BP: 118/60
PEAK_HR: 85
PEAK_METS: 5.2

## 2023-04-26 ENCOUNTER — HOSPITAL ENCOUNTER (EMERGENCY)
Age: 73
Discharge: HOME | End: 2023-04-26
Attending: EMERGENCY MEDICINE
Payer: MEDICARE

## 2023-04-26 ENCOUNTER — APPOINTMENT (OUTPATIENT)
Dept: CARDIAC REHAB | Age: 73
End: 2023-04-26
Payer: MEDICARE

## 2023-04-26 ENCOUNTER — HOSPITAL ENCOUNTER (OUTPATIENT)
Dept: CARDIAC REHAB | Age: 73
Setting detail: THERAPIES SERIES
Discharge: HOME | End: 2023-04-26
Payer: MEDICARE

## 2023-04-26 ENCOUNTER — APPOINTMENT (OUTPATIENT)
Dept: GENERAL RADIOLOGY | Age: 73
End: 2023-04-26
Payer: MEDICARE

## 2023-04-26 VITALS
HEART RATE: 60 BPM | DIASTOLIC BLOOD PRESSURE: 88 MMHG | SYSTOLIC BLOOD PRESSURE: 130 MMHG | HEIGHT: 71 IN | RESPIRATION RATE: 13 BRPM | WEIGHT: 227 LBS | BODY MASS INDEX: 31.78 KG/M2 | TEMPERATURE: 98 F | OXYGEN SATURATION: 96 %

## 2023-04-26 VITALS — WEIGHT: 227 LBS | BODY MASS INDEX: 31.66 KG/M2

## 2023-04-26 DIAGNOSIS — R07.9 CHEST PAIN, UNSPECIFIED TYPE: Primary | ICD-10-CM

## 2023-04-26 LAB
ABSOLUTE EOS #: 0.3 K/UL (ref 0–0.4)
ABSOLUTE LYMPH #: 2.4 K/UL (ref 1–4.8)
ABSOLUTE MONO #: 0.6 K/UL (ref 0.1–1.3)
ANION GAP SERPL CALCULATED.3IONS-SCNC: 11 MMOL/L (ref 9–17)
BASOPHILS # BLD: 1 % (ref 0–2)
BASOPHILS ABSOLUTE: 0 K/UL (ref 0–0.2)
BUN SERPL-MCNC: 13 MG/DL (ref 8–23)
CALCIUM SERPL-MCNC: 9.1 MG/DL (ref 8.6–10.4)
CHLORIDE SERPL-SCNC: 103 MMOL/L (ref 98–107)
CO2 SERPL-SCNC: 25 MMOL/L (ref 20–31)
CREAT SERPL-MCNC: 0.84 MG/DL (ref 0.7–1.2)
D DIMER BLD IA.RAPID-MCNC: 0.44 UG/ML FEU (ref 0–0.59)
EKG ATRIAL RATE: 258 BPM
EKG Q-T INTERVAL: 382 MS
EKG QRS DURATION: 90 MS
EKG QTC CALCULATION (BAZETT): 397 MS
EKG R AXIS: 76 DEGREES
EKG T AXIS: -52 DEGREES
EKG VENTRICULAR RATE: 65 BPM
EOSINOPHILS RELATIVE PERCENT: 4 % (ref 0–4)
GFR SERPL CREATININE-BSD FRML MDRD: >60 ML/MIN/1.73M2
GLUCOSE SERPL-MCNC: 195 MG/DL (ref 70–99)
HCT VFR BLD AUTO: 46 % (ref 41–53)
HGB BLD-MCNC: 16.3 G/DL (ref 13.5–17.5)
INR PPP: 1.2
LYMPHOCYTES # BLD: 32 % (ref 24–44)
MCH RBC QN AUTO: 31 PG (ref 26–34)
MCHC RBC AUTO-ENTMCNC: 35.6 G/DL (ref 31–37)
MCV RBC AUTO: 87.1 FL (ref 80–100)
MONOCYTES # BLD: 8 % (ref 1–7)
MYOGLOBIN SERPL-MCNC: 33 NG/ML (ref 28–72)
MYOGLOBIN SERPL-MCNC: 37 NG/ML (ref 28–72)
PARTIAL THROMBOPLASTIN TIME: 34.9 SEC (ref 24–36)
PDW BLD-RTO: 14.7 % (ref 11.5–14.9)
PLATELET # BLD AUTO: 205 K/UL (ref 150–450)
PMV BLD AUTO: 7.1 FL (ref 6–12)
POTASSIUM SERPL-SCNC: 4.2 MMOL/L (ref 3.7–5.3)
PROTHROMBIN TIME: 15.4 SEC (ref 11.8–14.6)
RBC # BLD: 5.28 M/UL (ref 4.5–5.9)
SEG NEUTROPHILS: 55 % (ref 36–66)
SEGMENTED NEUTROPHILS ABSOLUTE COUNT: 4.1 K/UL (ref 1.3–9.1)
SODIUM SERPL-SCNC: 139 MMOL/L (ref 135–144)
T4 FREE SERPL-MCNC: 1 NG/DL (ref 0.9–1.7)
TROPONIN I SERPL DL<=0.01 NG/ML-MCNC: 10 NG/L (ref 0–22)
TROPONIN I SERPL DL<=0.01 NG/ML-MCNC: 7 NG/L (ref 0–22)
TSH SERPL-ACNC: 1.57 UIU/ML (ref 0.3–5)
WBC # BLD AUTO: 7.4 K/UL (ref 3.5–11)

## 2023-04-26 PROCEDURE — 84443 ASSAY THYROID STIM HORMONE: CPT

## 2023-04-26 PROCEDURE — 71045 X-RAY EXAM CHEST 1 VIEW: CPT

## 2023-04-26 PROCEDURE — 85730 THROMBOPLASTIN TIME PARTIAL: CPT

## 2023-04-26 PROCEDURE — 99285 EMERGENCY DEPT VISIT HI MDM: CPT

## 2023-04-26 PROCEDURE — 93010 ELECTROCARDIOGRAM REPORT: CPT | Performed by: INTERNAL MEDICINE

## 2023-04-26 PROCEDURE — 85025 COMPLETE CBC W/AUTO DIFF WBC: CPT

## 2023-04-26 PROCEDURE — 84439 ASSAY OF FREE THYROXINE: CPT

## 2023-04-26 PROCEDURE — 85610 PROTHROMBIN TIME: CPT

## 2023-04-26 PROCEDURE — 93005 ELECTROCARDIOGRAM TRACING: CPT | Performed by: EMERGENCY MEDICINE

## 2023-04-26 PROCEDURE — 85379 FIBRIN DEGRADATION QUANT: CPT

## 2023-04-26 PROCEDURE — 83874 ASSAY OF MYOGLOBIN: CPT

## 2023-04-26 PROCEDURE — 6370000000 HC RX 637 (ALT 250 FOR IP): Performed by: EMERGENCY MEDICINE

## 2023-04-26 PROCEDURE — 80048 BASIC METABOLIC PNL TOTAL CA: CPT

## 2023-04-26 PROCEDURE — 84484 ASSAY OF TROPONIN QUANT: CPT

## 2023-04-26 PROCEDURE — 36415 COLL VENOUS BLD VENIPUNCTURE: CPT

## 2023-04-26 RX ORDER — RANOLAZINE 500 MG/1
1000 TABLET, EXTENDED RELEASE ORAL 2 TIMES DAILY
Qty: 60 TABLET | Refills: 0 | Status: SHIPPED | OUTPATIENT
Start: 2023-04-26

## 2023-04-26 RX ORDER — ISOSORBIDE MONONITRATE 30 MG/1
60 TABLET, EXTENDED RELEASE ORAL DAILY
Qty: 30 TABLET | Refills: 0 | Status: SHIPPED | OUTPATIENT
Start: 2023-04-26

## 2023-04-26 RX ORDER — ASPIRIN 81 MG/1
324 TABLET, CHEWABLE ORAL ONCE
Status: COMPLETED | OUTPATIENT
Start: 2023-04-26 | End: 2023-04-26

## 2023-04-26 RX ADMIN — ASPIRIN 81 MG 324 MG: 81 TABLET ORAL at 07:46

## 2023-04-26 ASSESSMENT — ENCOUNTER SYMPTOMS
FACIAL SWELLING: 0
DIARRHEA: 0
SINUS PRESSURE: 0
EYE PAIN: 0
NAUSEA: 0
CHEST TIGHTNESS: 0
RHINORRHEA: 0
SORE THROAT: 0
VOMITING: 0
EYE REDNESS: 0
EYE DISCHARGE: 0
BACK PAIN: 0
ABDOMINAL PAIN: 0
COUGH: 0
BLOOD IN STOOL: 0
COLOR CHANGE: 0
WHEEZING: 0
SHORTNESS OF BREATH: 1
CONSTIPATION: 0
TROUBLE SWALLOWING: 0

## 2023-04-26 ASSESSMENT — LIFESTYLE VARIABLES
HOW OFTEN DO YOU HAVE A DRINK CONTAINING ALCOHOL: MONTHLY OR LESS
HOW MANY STANDARD DRINKS CONTAINING ALCOHOL DO YOU HAVE ON A TYPICAL DAY: 1 OR 2

## 2023-04-26 ASSESSMENT — HEART SCORE: ECG: 1

## 2023-04-26 NOTE — ED NOTES
Mode of arrival (squad #, walk in, police, etc) : brought over from cardiac rehab      Chief complaint(s): chest pain      Arrival Note (brief scenario, treatment PTA, etc). : Pt arrives to ED c/o chest pain that started during exercise in cardiac rehab. Pt states it felt like a heavy feeling in his chest and throat. Pt states he was given one nitro which took away the pain. Pt states he is currently not having any chest pain. Pt attached to cardiac monitor and continuous pulse oximetry. Pt is A&Ox4, eupneic, PWD. GCS=15. Call light in reach. C= \"Have you ever felt that you should Cut down on your drinking? \"  No  A= \"Have people Annoyed you by criticizing your drinking? \"  No  G= \"Have you ever felt bad or Guilty about your drinking? \"  No  E= \"Have you ever had a drink as an Eye-opener first thing in the morning to steady your nerves or to help a hangover? \"  No      Deferred []      Reason for deferring: N/A    *If yes to two or more: probable alcohol abuse. Mireya Naik RN  04/26/23 0900

## 2023-04-26 NOTE — PROGRESS NOTES
Pt arrived to cardiac rehab, denied any cardiac symptoms. Stated that he still has SOB with exertion although has been improved. /60, HR 79, SPO2 95%RA. After 17 min of exercise pt stated that he had upper center and left chest pressure, rating a \"5\" on a scale of 1-10 with SOB. On the monitor pace spikes can be seen. Pt took 1 dose of PRN Nitro and O2 2L was applied. Pt chest pressure alleviated within 5 minutes. BP was rechecked and was 85/58. Pt stated that he has not had chest pressure like this since he was last in [facility]. Advised pt that he should be evaluated further in the ER and pt agreed. Called ER with report and took pt to ER by WC and O2 2L. Pt was not charged for visit as he was only able to exercise for 17 minutes. Apt rescheduled.

## 2023-04-26 NOTE — ED PROVIDER NOTES
[facility]  eMERGENCY dEPARTMENT eNCOUnter      Pt Name: Nic Tam  MRN: [id number]  [place] 1950  Date of evaluation: 4/26/23      CHIEF COMPLAINT       Chief Complaint   Patient presents with    Chest Pain         HISTORY OF PRESENT ILLNESS    Nic Tam is a 67 y.o. male who presents complaining of chest pain. Patient was overdue in cardiac rehab when he started getting a tightness in the top portion of his chest and shortness of breath. Patient also had some palpitations. Patient states this feels exact like when he had his heart attack before. Patient was in here about 6 weeks ago he says with similar symptoms and they said he did not have a heart attack but they evidently did a cath anyways and stated that he needed to restent one of his stents. Patient has had multiple stents placed. Patient has history of a flutter and is on Eliquis. Patient states that he has not had any cough or fevers recently. Patient states he did feel warm when this happened. Patient states he has been having a pain kind of on the lateral aspect of his left calf has been going on for a while that he thought was just kind of radiating down from his knee which he has bad knees. REVIEW OF SYSTEMS       Review of Systems   Constitutional:  Negative for activity change, appetite change, chills, diaphoresis and fever. HENT:  Negative for congestion, ear pain, facial swelling, nosebleeds, rhinorrhea, sinus pressure, sore throat and trouble swallowing. Eyes:  Negative for pain, discharge and redness. Respiratory:  Positive for shortness of breath. Negative for cough, chest tightness and wheezing. Cardiovascular:  Positive for chest pain and palpitations. Negative for leg swelling. Gastrointestinal:  Negative for abdominal pain, blood in stool, constipation, diarrhea, nausea and vomiting. Genitourinary:  Negative for difficulty urinating, dysuria, flank pain, frequency, genital sores and hematuria.

## 2023-04-27 ENCOUNTER — HOSPITAL ENCOUNTER (OUTPATIENT)
Dept: CARDIAC REHAB | Age: 73
Setting detail: THERAPIES SERIES
Discharge: HOME | End: 2023-04-27
Payer: MEDICARE

## 2023-04-27 LAB
EKG ATRIAL RATE: 63 BPM
EKG P-R INTERVAL: 416 MS
EKG Q-T INTERVAL: 394 MS
EKG QRS DURATION: 78 MS
EKG QTC CALCULATION (BAZETT): 403 MS
EKG R AXIS: 30 DEGREES
EKG T AXIS: -55 DEGREES
EKG VENTRICULAR RATE: 63 BPM

## 2023-04-27 PROCEDURE — 93010 ELECTROCARDIOGRAM REPORT: CPT | Performed by: INTERNAL MEDICINE

## 2023-05-02 ENCOUNTER — HOSPITAL ENCOUNTER (OUTPATIENT)
Dept: CARDIAC REHAB | Age: 73
Setting detail: THERAPIES SERIES
Discharge: HOME | End: 2023-05-02

## 2023-05-03 ENCOUNTER — HOSPITAL ENCOUNTER (OUTPATIENT)
Dept: CARDIAC REHAB | Age: 73
Setting detail: THERAPIES SERIES
Discharge: HOME | End: 2023-05-03

## 2023-05-04 ENCOUNTER — APPOINTMENT (OUTPATIENT)
Dept: CARDIAC REHAB | Age: 73
End: 2023-05-04
Payer: MEDICARE

## 2023-05-08 ENCOUNTER — HOSPITAL ENCOUNTER (OUTPATIENT)
Dept: CARDIAC REHAB | Age: 73
Setting detail: THERAPIES SERIES
Discharge: HOME | End: 2023-05-08
Payer: MEDICARE

## 2023-05-08 VITALS — BODY MASS INDEX: 30.71 KG/M2 | WEIGHT: 220.2 LBS

## 2023-05-08 PROCEDURE — 93798 PHYS/QHP OP CAR RHAB W/ECG: CPT

## 2023-05-08 ASSESSMENT — EXERCISE STRESS TEST
PEAK_METS: 5.2
PEAK_HR: 94
PEAK_BP: 120/68
PEAK_RPE: 13

## 2023-05-10 ENCOUNTER — HOSPITAL ENCOUNTER (OUTPATIENT)
Dept: CARDIAC REHAB | Age: 73
Setting detail: THERAPIES SERIES
Discharge: HOME | End: 2023-05-10
Payer: MEDICARE

## 2023-05-10 VITALS — BODY MASS INDEX: 31.06 KG/M2 | WEIGHT: 222.7 LBS

## 2023-05-10 PROCEDURE — 93798 PHYS/QHP OP CAR RHAB W/ECG: CPT

## 2023-05-10 ASSESSMENT — EXERCISE STRESS TEST
PEAK_BP: 142/72
PEAK_HR: 79
PEAK_METS: 5.1
PEAK_RPE: 13

## 2023-05-11 ENCOUNTER — HOSPITAL ENCOUNTER (OUTPATIENT)
Dept: CARDIAC REHAB | Age: 73
Setting detail: THERAPIES SERIES
Discharge: HOME | End: 2023-05-11
Payer: MEDICARE

## 2023-05-11 VITALS — WEIGHT: 217.9 LBS | BODY MASS INDEX: 30.39 KG/M2

## 2023-05-11 PROCEDURE — 93798 PHYS/QHP OP CAR RHAB W/ECG: CPT

## 2023-05-11 ASSESSMENT — EXERCISE STRESS TEST
PEAK_RPE: 13
PEAK_METS: 5.1
PEAK_BP: 122/78
PEAK_HR: 73

## 2023-05-15 ENCOUNTER — HOSPITAL ENCOUNTER (OUTPATIENT)
Dept: CARDIAC REHAB | Age: 73
Setting detail: THERAPIES SERIES
Discharge: HOME | End: 2023-05-15
Payer: MEDICARE

## 2023-05-15 VITALS — WEIGHT: 216 LBS | BODY MASS INDEX: 30.13 KG/M2

## 2023-05-15 PROCEDURE — 93798 PHYS/QHP OP CAR RHAB W/ECG: CPT

## 2023-05-15 ASSESSMENT — EJECTION FRACTION: EF_VALUE: 55

## 2023-05-15 ASSESSMENT — EXERCISE STRESS TEST
PEAK_METS: 4.8
PEAK_HR: 80
PEAK_BP: 138/77
PEAK_BP: 138/72
PEAK_RPE: 13

## 2023-05-15 NOTE — FLOWSHEET NOTE
Education   Education Benefits of CPR completion;Cardiac meds; Coping techniques; Environmental triggers; Impact self care behaviors on health;Relaxation techniques; Sexual activity; Signs/symptoms of depression  --    Psychosocial Target Goals   Target Goal(s) Assess presence or absence of depression using a valid screening tool;Demonstrates appropriate interaction with others;Engages in self-care behaviors;Maximizes coping skills  --    Uses Stress Mgmt Techniques Yes  --    Tobacco   Stages of Change Maintenance  --    Tobacco Use No  --    Quit Greater than 6 month  --    Date Quit 01/01/16  --    # Cigarette Smoked/Day 1/2 PPD FOR 54 YEARS  --    Tobacco Cessation Intervention   Smoking Cessation Referral No  --    Target Goal   Target Goal Complete cessation of tobacco use  --    Nutrition   Stages of Change Action  --    Diabetes Yes  --    HgbA1c 6.0  --    HgbA1c Date 12/16/22  --    Diabetes Med(s) YES  --    Diabetes Med(s) Change No  --    Lipids   Date Lipids Drawn 12/16/22  --    Total 114  --    HDL 36  --    LDL 40  --    Triglycerides 189  --    Lipid Med(s) YES  --    Lipid Med Change(s) No  --    Weight Management   Weight  216 lb (98 kg)  --    Height  5' 10\" (1.778 m)  --    BMI 31.06  --    Alcohol None  --    Nutrition Intervention   Dietitian Consult No  --    Nurse/Patient Discussion Yes  --    Nutrition Class No  --    Diabetes Education Referral No  --    Lipid Clinic Referral No  --    Weight Management Referral No  --    Nutrition Education   Education Signs/symptoms/treatment of hypo/hyperglycemia  --    Nutrition Target Goals   Target Goals LDL-C less than 70 and non-HDL-C <100 for those with ASCVD  --    Education   Learning Barrier Ready to learn  --    Education Intervention   Education Schedule Given Yes  --    Patient Education    Education CAD;Risk factors;Med compliance;Cardiac A&P;Signs/Symptoms of angina; Sexuality  --    Hypertension Yes  --    Hypertension Controlled Yes  --    Is

## 2023-05-17 ENCOUNTER — HOSPITAL ENCOUNTER (OUTPATIENT)
Dept: CARDIAC REHAB | Age: 73
Setting detail: THERAPIES SERIES
Discharge: HOME | End: 2023-05-17
Payer: MEDICARE

## 2023-05-17 VITALS — WEIGHT: 213 LBS | BODY MASS INDEX: 29.71 KG/M2

## 2023-05-17 PROCEDURE — 93798 PHYS/QHP OP CAR RHAB W/ECG: CPT

## 2023-05-17 ASSESSMENT — EXERCISE STRESS TEST
PEAK_METS: 5
PEAK_RPE: 12
PEAK_BP: 140/76
PEAK_HR: 77

## 2023-05-18 ENCOUNTER — HOSPITAL ENCOUNTER (OUTPATIENT)
Dept: CARDIAC REHAB | Age: 73
Setting detail: THERAPIES SERIES
Discharge: HOME | End: 2023-05-18
Payer: MEDICARE

## 2023-05-19 DIAGNOSIS — I65.23 BILATERAL CAROTID ARTERY STENOSIS: Primary | ICD-10-CM

## 2023-05-22 ENCOUNTER — HOSPITAL ENCOUNTER (OUTPATIENT)
Dept: CARDIAC REHAB | Age: 73
Setting detail: THERAPIES SERIES
Discharge: HOME | End: 2023-05-22
Payer: MEDICARE

## 2023-05-22 VITALS — BODY MASS INDEX: 30.22 KG/M2 | WEIGHT: 216.7 LBS

## 2023-05-22 PROCEDURE — 93798 PHYS/QHP OP CAR RHAB W/ECG: CPT

## 2023-05-22 ASSESSMENT — EXERCISE STRESS TEST
PEAK_BP: 138/70
PEAK_METS: 5
PEAK_HR: 78
PEAK_RPE: 13

## 2023-05-24 ENCOUNTER — HOSPITAL ENCOUNTER (OUTPATIENT)
Dept: CARDIAC REHAB | Age: 73
Setting detail: THERAPIES SERIES
Discharge: HOME | End: 2023-05-24
Payer: MEDICARE

## 2023-05-24 VITALS — BODY MASS INDEX: 29.78 KG/M2 | WEIGHT: 213.5 LBS

## 2023-05-24 PROCEDURE — 93798 PHYS/QHP OP CAR RHAB W/ECG: CPT

## 2023-05-24 ASSESSMENT — EXERCISE STRESS TEST
PEAK_METS: 5
PEAK_BP: 110/62
PEAK_RPE: 15
PEAK_HR: 98

## 2023-05-25 ENCOUNTER — HOSPITAL ENCOUNTER (OUTPATIENT)
Dept: CARDIAC REHAB | Age: 73
Setting detail: THERAPIES SERIES
Discharge: HOME | End: 2023-05-25
Payer: MEDICARE

## 2023-05-25 VITALS — BODY MASS INDEX: 30.4 KG/M2 | WEIGHT: 218 LBS

## 2023-05-25 PROCEDURE — 93798 PHYS/QHP OP CAR RHAB W/ECG: CPT

## 2023-05-25 ASSESSMENT — EXERCISE STRESS TEST
PEAK_METS: 4.8
PEAK_BP: 122/50
PEAK_HR: 73
PEAK_RPE: 15

## 2023-05-25 NOTE — PROGRESS NOTES
Pt states he had an episode of chest pain while working physical job yesterday that resolved with rest. He had not been taking his Ranexa for 3 days because of a pharmacy delay. He was able to get this filled today and took this am. Today he is feeling a little fatigued. Discussed ordering meds 1 week before his scripts run out or possibly changing to mail order. Advised to avoid strenuous activity today. He plans on working but only supervising. Advised that if chest pain returns, he should go to the ER for further evaluation.

## 2023-05-31 ENCOUNTER — HOSPITAL ENCOUNTER (OUTPATIENT)
Dept: CARDIAC REHAB | Age: 73
Setting detail: THERAPIES SERIES
Discharge: HOME | End: 2023-05-31
Payer: MEDICARE

## 2023-05-31 VITALS — WEIGHT: 215.8 LBS | BODY MASS INDEX: 30.1 KG/M2

## 2023-05-31 PROCEDURE — 93798 PHYS/QHP OP CAR RHAB W/ECG: CPT

## 2023-05-31 ASSESSMENT — EXERCISE STRESS TEST
PEAK_BP: 128/72
PEAK_HR: 78
PEAK_METS: 5.1
PEAK_RPE: 14

## 2023-06-01 ENCOUNTER — HOSPITAL ENCOUNTER (OUTPATIENT)
Dept: CARDIAC REHAB | Age: 73
Setting detail: THERAPIES SERIES
Discharge: HOME | End: 2023-06-01

## 2023-06-01 NOTE — PROGRESS NOTES
Pt called to say he will not be in today. He said there is nothing wrong, just had other obligations.

## 2023-06-05 ENCOUNTER — HOSPITAL ENCOUNTER (OUTPATIENT)
Dept: CARDIAC REHAB | Age: 73
Setting detail: THERAPIES SERIES
Discharge: HOME | End: 2023-06-05
Payer: MEDICARE

## 2023-06-06 ENCOUNTER — HOSPITAL ENCOUNTER (OUTPATIENT)
Dept: CARDIAC REHAB | Age: 73
Setting detail: THERAPIES SERIES
Discharge: HOME | End: 2023-06-06
Payer: MEDICARE

## 2023-06-06 VITALS — WEIGHT: 217.4 LBS | BODY MASS INDEX: 30.32 KG/M2

## 2023-06-06 PROCEDURE — 93798 PHYS/QHP OP CAR RHAB W/ECG: CPT

## 2023-06-06 ASSESSMENT — EXERCISE STRESS TEST
PEAK_BP: 126/80
PEAK_RPE: 14
PEAK_HR: 90
PEAK_METS: 5.5
PEAK_BP: 126/80

## 2023-06-06 ASSESSMENT — LIFESTYLE VARIABLES: ALCOHOL_USE: SPECIAL

## 2023-06-06 ASSESSMENT — EJECTION FRACTION: EF_VALUE: 55

## 2023-06-06 NOTE — PROGRESS NOTES
Nutrition Target Goals   Target Goals LDL-C less than 70 and non-HDL-C <100 for those with ASCVD;Triglycerides less than 150;HbA1C less than 7 percent for those with diabetes   Education   Learning Barrier Ready to learn   Education Intervention   Education Schedule Given Yes   Patient Education    Education CAD;Risk factors;Med compliance;Cardiac A&P;Signs/Symptoms of angina; Sexuality   Hypertension Yes   Hypertension Controlled Yes   Is BP WDL Yes   Med(s) Change Yes   BP Meds YES   ACE/ARB Prescribed No/exception   ACE/ARB Adherent Yes   ASA Prescribed No   Antiplatelet Prescribed No   BB Prescribed Yes   BB Adherent Yes   Statins Prescribed No/exception   Education Target Goals   Target Goals Medication compliance; Risk factors; Understand target guidelines for lipids; Understand target guidelines for B/P   Patient Stated Education Goals TO [facility]   Staff Treatment Goals   Goals Exercise; Functional capacity   Exercise Goal TO INCREASE STRENGTH   Exercise Goal Status Progressing   Exercise Goal Assessment Recommendations   Functional Capacity Goal TO INCREASE ENDURANCE   Functional Capacity Goal Status Progressing   Functional Capacity Goal Comments PT HAS INCREASED IN TIME AND METS.  HE STATES HE HAS LESS SOB WITH EXERTION   Functional Capacity Goal Assessment Recommendations

## 2023-06-07 ENCOUNTER — HOSPITAL ENCOUNTER (OUTPATIENT)
Dept: CARDIAC REHAB | Age: 73
Setting detail: THERAPIES SERIES
Discharge: HOME | End: 2023-06-07
Payer: MEDICARE

## 2023-06-07 VITALS — WEIGHT: 216 LBS | BODY MASS INDEX: 30.13 KG/M2

## 2023-06-07 PROCEDURE — 93798 PHYS/QHP OP CAR RHAB W/ECG: CPT

## 2023-06-07 ASSESSMENT — EXERCISE STRESS TEST
PEAK_RPE: 15
PEAK_HR: 73
PEAK_METS: 4.8
PEAK_BP: 120/62

## 2023-06-08 ENCOUNTER — HOSPITAL ENCOUNTER (OUTPATIENT)
Dept: CARDIAC REHAB | Age: 73
Setting detail: THERAPIES SERIES
Discharge: HOME | End: 2023-06-08
Payer: MEDICARE

## 2023-06-08 VITALS — BODY MASS INDEX: 30.13 KG/M2 | WEIGHT: 216 LBS

## 2023-06-08 PROCEDURE — 93798 PHYS/QHP OP CAR RHAB W/ECG: CPT

## 2023-06-08 ASSESSMENT — EXERCISE STRESS TEST
PEAK_RPE: 16
PEAK_BP: 140/74
PEAK_HR: 85
PEAK_METS: 5.5

## 2023-06-19 ENCOUNTER — HOSPITAL ENCOUNTER (OUTPATIENT)
Dept: CARDIAC REHAB | Age: 73
Setting detail: THERAPIES SERIES
Discharge: HOME | End: 2023-06-19
Payer: MEDICARE

## 2023-06-21 ENCOUNTER — APPOINTMENT (OUTPATIENT)
Dept: CARDIAC REHAB | Age: 73
End: 2023-06-21
Payer: MEDICARE

## 2023-06-21 ENCOUNTER — HOSPITAL ENCOUNTER (OUTPATIENT)
Dept: CARDIAC REHAB | Age: 73
Setting detail: THERAPIES SERIES
Discharge: HOME | End: 2023-06-21
Payer: MEDICARE

## 2023-06-22 ENCOUNTER — APPOINTMENT (OUTPATIENT)
Dept: CARDIAC REHAB | Age: 73
End: 2023-06-22
Payer: MEDICARE

## 2023-06-26 ENCOUNTER — APPOINTMENT (OUTPATIENT)
Dept: CARDIAC REHAB | Age: 73
End: 2023-06-26
Payer: MEDICARE

## 2023-06-27 ENCOUNTER — TELEPHONE (OUTPATIENT)
Dept: VASCULAR SURGERY | Age: 73
End: 2023-06-27

## 2023-06-28 ENCOUNTER — HOSPITAL ENCOUNTER (OUTPATIENT)
Dept: CARDIAC REHAB | Age: 73
Setting detail: THERAPIES SERIES
Discharge: HOME | End: 2023-06-28
Payer: MEDICARE

## 2023-07-03 ENCOUNTER — HOSPITAL ENCOUNTER (OUTPATIENT)
Dept: CARDIAC REHAB | Age: 73
Setting detail: THERAPIES SERIES
Discharge: HOME | End: 2023-07-03
Payer: MEDICARE

## 2023-07-05 ENCOUNTER — HOSPITAL ENCOUNTER (OUTPATIENT)
Dept: CARDIAC REHAB | Age: 73
Setting detail: THERAPIES SERIES
Discharge: HOME | End: 2023-07-05
Payer: MEDICARE

## 2023-07-05 VITALS — WEIGHT: 215 LBS | BODY MASS INDEX: 29.99 KG/M2

## 2023-07-05 PROCEDURE — 93798 PHYS/QHP OP CAR RHAB W/ECG: CPT

## 2023-07-05 ASSESSMENT — EXERCISE STRESS TEST
PEAK_BP: 128/68
PEAK_METS: 5.9
PEAK_RPE: 13
PEAK_BP: 128/68
PEAK_HR: 105

## 2023-07-05 ASSESSMENT — LIFESTYLE VARIABLES: ALCOHOL_USE: SPECIAL

## 2023-07-05 ASSESSMENT — EJECTION FRACTION: EF_VALUE: 55

## 2023-07-05 NOTE — FLOWSHEET NOTE
07/05/23 0709   Treatment Diagnosis   Treatment Diagnosis 1 PCI   PCI Date 02/16/23   Referral Date 02/16/23   Significant Cardiovascular History Pacemaker;Previous myocardial infarction;Previous PCI   Co-morbidities Diabetes;Previous MI;Pulmonary disease   Oxygen Saturation / Titration    Stages of Change  Maintenance   Oxygen Intervention   Oxygen Use No   O2 Sat Greater Than 90% Yes   Nurse/Patient Discussion  YES   Individual Treatment Plan   ITP Visit Type Re-assessment   1st Date of Exercise  03/16/23   ITP Next Review Date 07/03/23   Visit #/Total Visits 23/36   EF% 55 %   Risk Stratification Moderate   ITP Exercise;Psychosocial;Tobacco;Nutrition;Education   Exercise    Stages of Change Action   Assisted Devices None   Exercise Prescription   Mode Treadmill;Bike;Stepper   Frequency per week 3   Duration Per Session 49 MIN   Intensity METS       5.9   RPE 13   Progression PROGRESSING   Symptoms with Exercise Shortness of breath   Resistance Training Yes   Exercise Blood Pressures   Resting /52   Peak /68   Is BP WDL Yes   Exercise Activity at Home   Type YARD WORK, PHYSICAL JOB   Frequency DAILY   Duration 1 HOUR   Resistance Training No   Exercise Education   Education Self pulse;Exercise safety;Signs/symptoms to report;RPE scale;Equipment orientation; Warm up/cool down;Physically active   Exercise Target Goal   Target Goal(s) Individual exercise RX;BP < 140/90 or < 130/80, if DM or CKD; Aerobic activity 30 + minutes/day  5 days/week   Patient Stated Exercise Goals TO BREATHE EASIER   Psychosocial   Stages of Change Maintenance   Psychosocial Intervention   Interventions No intervention indicated   Resources Provided Other (comment)  (PARTICIPATED IN HEALTHY MINDS CLASS)   Currently Taking Psychotropic Meds No   Medication Changes No   Psychosocial Education   Education Benefits of CPR completion;Cardiac meds; Coping techniques; Environmental triggers; Impact self care behaviors on health;Relaxation

## 2023-07-06 ENCOUNTER — HOSPITAL ENCOUNTER (OUTPATIENT)
Dept: CARDIAC REHAB | Age: 73
Setting detail: THERAPIES SERIES
Discharge: HOME | End: 2023-07-06
Payer: MEDICARE

## 2023-07-06 ENCOUNTER — APPOINTMENT (OUTPATIENT)
Dept: CARDIAC REHAB | Age: 73
End: 2023-07-06
Payer: MEDICARE

## 2023-07-06 VITALS — BODY MASS INDEX: 29.97 KG/M2 | WEIGHT: 214.9 LBS

## 2023-07-06 PROCEDURE — 93798 PHYS/QHP OP CAR RHAB W/ECG: CPT

## 2023-07-06 ASSESSMENT — EXERCISE STRESS TEST
PEAK_METS: 5.1
PEAK_HR: 82
PEAK_BP: 124/66
PEAK_RPE: 15

## 2023-07-07 ENCOUNTER — HOSPITAL ENCOUNTER (OUTPATIENT)
Dept: VASCULAR LAB | Age: 73
Discharge: HOME | End: 2023-07-07
Payer: MEDICARE

## 2023-07-07 DIAGNOSIS — I65.23 BILATERAL CAROTID ARTERY STENOSIS: ICD-10-CM

## 2023-07-07 PROCEDURE — 93880 EXTRACRANIAL BILAT STUDY: CPT

## 2023-07-10 ENCOUNTER — HOSPITAL ENCOUNTER (OUTPATIENT)
Dept: CARDIAC REHAB | Age: 73
Setting detail: THERAPIES SERIES
Discharge: HOME | End: 2023-07-10
Payer: MEDICARE

## 2023-07-10 VITALS — WEIGHT: 215.3 LBS | BODY MASS INDEX: 30.03 KG/M2

## 2023-07-10 PROCEDURE — 93798 PHYS/QHP OP CAR RHAB W/ECG: CPT

## 2023-07-10 ASSESSMENT — EXERCISE STRESS TEST
PEAK_HR: 84
PEAK_BP: 136/68
PEAK_METS: 5.5
PEAK_RPE: 15

## 2023-07-12 ENCOUNTER — HOSPITAL ENCOUNTER (OUTPATIENT)
Dept: CARDIAC REHAB | Age: 73
Setting detail: THERAPIES SERIES
Discharge: HOME | End: 2023-07-12
Payer: MEDICARE

## 2023-07-12 VITALS — BODY MASS INDEX: 29.99 KG/M2 | WEIGHT: 215 LBS

## 2023-07-12 PROCEDURE — 93798 PHYS/QHP OP CAR RHAB W/ECG: CPT

## 2023-07-12 ASSESSMENT — EXERCISE STRESS TEST
PEAK_HR: 87
PEAK_RPE: 13
PEAK_METS: 6.3
PEAK_BP: 112/72

## 2023-07-13 ENCOUNTER — HOSPITAL ENCOUNTER (OUTPATIENT)
Dept: CARDIAC REHAB | Age: 73
Setting detail: THERAPIES SERIES
Discharge: HOME | End: 2023-07-13
Payer: MEDICARE

## 2023-07-17 ENCOUNTER — OFFICE VISIT (OUTPATIENT)
Dept: VASCULAR SURGERY | Age: 73
End: 2023-07-17
Payer: MEDICARE

## 2023-07-17 ENCOUNTER — HOSPITAL ENCOUNTER (OUTPATIENT)
Dept: CARDIAC REHAB | Age: 73
Setting detail: THERAPIES SERIES
Discharge: HOME | End: 2023-07-17
Payer: MEDICARE

## 2023-07-17 VITALS
WEIGHT: 217 LBS | BODY MASS INDEX: 30.38 KG/M2 | RESPIRATION RATE: 20 BRPM | HEART RATE: 78 BPM | TEMPERATURE: 98.2 F | OXYGEN SATURATION: 98 % | HEIGHT: 71 IN | SYSTOLIC BLOOD PRESSURE: 132 MMHG | DIASTOLIC BLOOD PRESSURE: 70 MMHG

## 2023-07-17 VITALS — WEIGHT: 216.9 LBS | BODY MASS INDEX: 30.25 KG/M2

## 2023-07-17 DIAGNOSIS — I65.21 STENOSIS OF RIGHT CAROTID ARTERY: Primary | ICD-10-CM

## 2023-07-17 PROCEDURE — 3017F COLORECTAL CA SCREEN DOC REV: CPT | Performed by: SURGERY

## 2023-07-17 PROCEDURE — 99213 OFFICE O/P EST LOW 20 MIN: CPT | Performed by: SURGERY

## 2023-07-17 PROCEDURE — 93798 PHYS/QHP OP CAR RHAB W/ECG: CPT

## 2023-07-17 PROCEDURE — 3078F DIAST BP <80 MM HG: CPT | Performed by: SURGERY

## 2023-07-17 PROCEDURE — G8427 DOCREV CUR MEDS BY ELIG CLIN: HCPCS | Performed by: SURGERY

## 2023-07-17 PROCEDURE — G8417 CALC BMI ABV UP PARAM F/U: HCPCS | Performed by: SURGERY

## 2023-07-17 PROCEDURE — 1123F ACP DISCUSS/DSCN MKR DOCD: CPT | Performed by: SURGERY

## 2023-07-17 PROCEDURE — 1036F TOBACCO NON-USER: CPT | Performed by: SURGERY

## 2023-07-17 PROCEDURE — 3075F SYST BP GE 130 - 139MM HG: CPT | Performed by: SURGERY

## 2023-07-17 ASSESSMENT — EXERCISE STRESS TEST
PEAK_HR: 126
PEAK_METS: 6
PEAK_BP: 128/58
PEAK_RPE: 14

## 2023-07-19 ENCOUNTER — HOSPITAL ENCOUNTER (OUTPATIENT)
Dept: CARDIAC REHAB | Age: 73
Setting detail: THERAPIES SERIES
Discharge: HOME | End: 2023-07-19
Payer: MEDICARE

## 2023-07-20 ENCOUNTER — HOSPITAL ENCOUNTER (OUTPATIENT)
Dept: CARDIAC REHAB | Age: 73
Setting detail: THERAPIES SERIES
End: 2023-07-20
Payer: MEDICARE

## 2023-07-24 ENCOUNTER — HOSPITAL ENCOUNTER (OUTPATIENT)
Dept: CARDIAC REHAB | Age: 73
Setting detail: THERAPIES SERIES
Discharge: HOME | End: 2023-07-24
Payer: MEDICARE

## 2023-07-24 NOTE — PROGRESS NOTES
Pt was no show no call for Cardiac Rehab session for today, states he forgot to call, having car troubles.

## 2023-07-26 ENCOUNTER — HOSPITAL ENCOUNTER (OUTPATIENT)
Dept: CARDIAC REHAB | Age: 73
Setting detail: THERAPIES SERIES
Discharge: HOME | End: 2023-07-26
Payer: MEDICARE

## 2023-07-26 ENCOUNTER — APPOINTMENT (OUTPATIENT)
Dept: CARDIAC REHAB | Age: 73
End: 2023-07-26
Payer: MEDICARE

## 2023-07-26 NOTE — PROGRESS NOTES
Last visit 3/25/22  Next visit 9/29/22   Pt called to cancel Cardiac Rehab session for today, states not feeling well

## 2023-07-27 ENCOUNTER — HOSPITAL ENCOUNTER (OUTPATIENT)
Dept: CARDIAC REHAB | Age: 73
Setting detail: THERAPIES SERIES
Discharge: HOME | End: 2023-07-27
Payer: MEDICARE

## 2023-07-27 VITALS — WEIGHT: 218 LBS | BODY MASS INDEX: 30.4 KG/M2

## 2023-07-27 PROCEDURE — 93798 PHYS/QHP OP CAR RHAB W/ECG: CPT

## 2023-07-27 ASSESSMENT — EXERCISE STRESS TEST
PEAK_RPE: 15
PEAK_HR: 98
PEAK_BP: 124/64
PEAK_METS: 5.2

## 2023-07-31 ENCOUNTER — HOSPITAL ENCOUNTER (OUTPATIENT)
Dept: CARDIAC REHAB | Age: 73
Setting detail: THERAPIES SERIES
End: 2023-07-31
Payer: MEDICARE

## 2023-08-02 ENCOUNTER — HOSPITAL ENCOUNTER (OUTPATIENT)
Dept: CARDIAC REHAB | Age: 73
Setting detail: THERAPIES SERIES
Discharge: HOME | End: 2023-08-02
Payer: MEDICARE

## 2023-08-02 VITALS — BODY MASS INDEX: 29.93 KG/M2 | WEIGHT: 214.6 LBS

## 2023-08-02 PROCEDURE — 93798 PHYS/QHP OP CAR RHAB W/ECG: CPT

## 2023-08-02 ASSESSMENT — EXERCISE STRESS TEST
PEAK_BP: 136/80
PEAK_RPE: 14
PEAK_HR: 99
PEAK_METS: 6.1

## 2023-08-03 ENCOUNTER — HOSPITAL ENCOUNTER (OUTPATIENT)
Dept: CARDIAC REHAB | Age: 73
Setting detail: THERAPIES SERIES
Discharge: HOME | End: 2023-08-03
Payer: MEDICARE

## 2023-08-03 VITALS — WEIGHT: 215.2 LBS | BODY MASS INDEX: 30.01 KG/M2

## 2023-08-03 PROCEDURE — 93798 PHYS/QHP OP CAR RHAB W/ECG: CPT

## 2023-08-03 ASSESSMENT — EXERCISE STRESS TEST
PEAK_METS: 5.2
PEAK_HR: 75
PEAK_BP: 128/72
PEAK_RPE: 12

## 2023-08-07 ENCOUNTER — HOSPITAL ENCOUNTER (OUTPATIENT)
Dept: CARDIAC REHAB | Age: 73
Setting detail: THERAPIES SERIES
Discharge: HOME | End: 2023-08-07
Payer: MEDICARE

## 2023-08-07 VITALS — BODY MASS INDEX: 30.68 KG/M2 | WEIGHT: 220 LBS

## 2023-08-07 PROCEDURE — 93798 PHYS/QHP OP CAR RHAB W/ECG: CPT

## 2023-08-07 ASSESSMENT — EXERCISE STRESS TEST
PEAK_BP: 126/68
PEAK_METS: 6.1
PEAK_RPE: 15
PEAK_HR: 111

## 2023-08-09 ENCOUNTER — HOSPITAL ENCOUNTER (OUTPATIENT)
Dept: CARDIAC REHAB | Age: 73
Setting detail: THERAPIES SERIES
Discharge: HOME | End: 2023-08-09
Payer: MEDICARE

## 2023-08-09 VITALS — WEIGHT: 217 LBS | BODY MASS INDEX: 30.27 KG/M2

## 2023-08-09 PROCEDURE — 93798 PHYS/QHP OP CAR RHAB W/ECG: CPT

## 2023-08-09 ASSESSMENT — PATIENT HEALTH QUESTIONNAIRE - PHQ9
1. LITTLE INTEREST OR PLEASURE IN DOING THINGS: 1
2. FEELING DOWN, DEPRESSED OR HOPELESS: 0
SUM OF ALL RESPONSES TO PHQ QUESTIONS 1-9: 1
SUM OF ALL RESPONSES TO PHQ9 QUESTIONS 1 & 2: 1

## 2023-08-09 ASSESSMENT — EJECTION FRACTION: EF_VALUE: 55

## 2023-08-09 ASSESSMENT — LIFESTYLE VARIABLES: ALCOHOL_USE: SPECIAL

## 2023-08-09 ASSESSMENT — EXERCISE STRESS TEST
PEAK_RPE: 15
PEAK_BP: 140/70
PEAK_HR: 78
PEAK_BP: 140/70
PEAK_METS: 7

## 2023-08-09 NOTE — PROGRESS NOTES
Pt completed 34 exercise visits, 1 class and 1 admission appt. ITP updated, PHQ, DASI, and Cardiac Knowledge Test completed, medications reviewed and updated as needed. Patient provided with discharge AVS and confidential survey. Pt  plans to participate in Phase 3 cardiac rehab. Some goals have been met. Pt has increased his strength and endurance. He reports his breathing has not improved.

## 2023-08-09 NOTE — FLOWSHEET NOTE
08/09/23 0720   Treatment Diagnosis   Treatment Diagnosis 1 PCI   PCI Date 02/16/23   Referral Date 02/16/23   Significant Cardiovascular History Pacemaker;Previous myocardial infarction;Previous PCI   Co-morbidities Diabetes;Previous MI;Pulmonary disease   Oxygen Saturation / Titration    Stages of Change  Maintenance   Oxygen Intervention   Oxygen Use No   O2 Sat Greater Than 90% Yes   Nurse/Patient Discussion  YES   Individual Treatment Plan   ITP Visit Type Discharge, completed program   1st Date of Exercise  03/16/23   Visit #/Total Visits 36/36   EF% 55 %   Risk Stratification Moderate   ITP Exercise;Psychosocial;Tobacco;Nutrition;Education   Exercise    Stages of Change Maintenance   Assisted Devices None   Exercise Prescription   Mode Treadmill;Bike;Stepper   Frequency per week 3   Duration Per Session 49 MIN   Intensity METS       7   RPE 15   Progression PROGRAM COMPLETION   Symptoms with Exercise Shortness of breath  (UNCHANGED)   Resistance Training Yes   Exercise Blood Pressures   Resting /80   Peak /70   Is BP WDL No  (PT BP HIGHER THEN USUAL AT LAST VISIT HOWEVER HAS BEEN WITHIN NORMAL RANGE)   Exercise Activity at Home   Type YARD WORK, PHYSICAL JOB   Frequency DAILY   Duration 1 HOUR   Resistance Training No   Exercise Education   Education Self pulse;Exercise safety;Signs/symptoms to report;RPE scale;Equipment orientation; Warm up/cool down;Physically active   Exercise Target Goal   Target Goal(s) Individual exercise RX;BP < 140/90 or < 130/80, if DM or CKD; Aerobic activity 30 + minutes/day  5 days/week   Patient Stated Exercise Goals TO BREATHE EASIER   Psychosocial   Stages of Change Maintenance   Psychosocial Intervention   Interventions No intervention indicated   Resources Provided Other (comment)  (PT PARTICIPATED IN HEALTHY MINDS CLASS)   Currently Taking Psychotropic Meds No   Medication Changes No   Psychosocial Education   Education Benefits of CPR completion;Cardiac

## 2023-08-15 ENCOUNTER — HOSPITAL ENCOUNTER (OUTPATIENT)
Age: 73
Setting detail: SPECIMEN
Discharge: HOME | End: 2023-08-15

## 2023-08-15 LAB
BASOPHILS # BLD: 0.04 K/UL (ref 0–0.2)
BASOPHILS NFR BLD: 1 % (ref 0–2)
EOSINOPHIL # BLD: 0.2 K/UL (ref 0–0.44)
EOSINOPHILS RELATIVE PERCENT: 2 % (ref 1–4)
ERYTHROCYTE [DISTWIDTH] IN BLOOD BY AUTOMATED COUNT: 13.8 % (ref 11.8–14.4)
HCT VFR BLD AUTO: 48.4 % (ref 40.7–50.3)
HGB BLD-MCNC: 15.9 G/DL (ref 13–17)
IMM GRANULOCYTES # BLD AUTO: 0.06 K/UL (ref 0–0.3)
IMM GRANULOCYTES NFR BLD: 1 %
LYMPHOCYTES NFR BLD: 2.84 K/UL (ref 1.1–3.7)
LYMPHOCYTES RELATIVE PERCENT: 34 % (ref 24–43)
MAGNESIUM SERPL-MCNC: 1.8 MG/DL (ref 1.6–2.6)
MCH RBC QN AUTO: 30.7 PG (ref 25.2–33.5)
MCHC RBC AUTO-ENTMCNC: 32.9 G/DL (ref 28.4–34.8)
MCV RBC AUTO: 93.4 FL (ref 82.6–102.9)
MONOCYTES NFR BLD: 0.81 K/UL (ref 0.1–1.2)
MONOCYTES NFR BLD: 10 % (ref 3–12)
NEUTROPHILS NFR BLD: 52 % (ref 36–65)
NEUTS SEG NFR BLD: 4.43 K/UL (ref 1.5–8.1)
NRBC BLD-RTO: 0 PER 100 WBC
PLATELET # BLD AUTO: 203 K/UL (ref 138–453)
PMV BLD AUTO: 9.6 FL (ref 8.1–13.5)
RBC # BLD AUTO: 5.18 M/UL (ref 4.21–5.77)
WBC OTHER # BLD: 8.4 K/UL (ref 3.5–11.3)

## 2023-08-16 LAB
EST. AVERAGE GLUCOSE BLD GHB EST-MCNC: 126 MG/DL
HBA1C MFR BLD: 6 % (ref 4–6)

## 2023-08-21 ENCOUNTER — HOSPITAL ENCOUNTER (OUTPATIENT)
Dept: CARDIAC REHAB | Age: 73
Discharge: HOME | End: 2023-08-21

## 2023-08-21 PROCEDURE — 9900000065 HC CARDIAC REHAB PHASE 3 - 1 VISIT

## 2023-11-03 ENCOUNTER — HOSPITAL ENCOUNTER (EMERGENCY)
Age: 73
Discharge: HOME | End: 2023-11-03
Attending: EMERGENCY MEDICINE
Payer: MEDICARE

## 2023-11-03 VITALS
HEART RATE: 62 BPM | WEIGHT: 218 LBS | DIASTOLIC BLOOD PRESSURE: 102 MMHG | HEIGHT: 71 IN | RESPIRATION RATE: 18 BRPM | TEMPERATURE: 97.5 F | OXYGEN SATURATION: 97 % | SYSTOLIC BLOOD PRESSURE: 132 MMHG | BODY MASS INDEX: 30.52 KG/M2

## 2023-11-03 DIAGNOSIS — S51.811A SKIN TEAR OF RIGHT FOREARM WITHOUT COMPLICATION, INITIAL ENCOUNTER: Primary | ICD-10-CM

## 2023-11-03 PROCEDURE — 99282 EMERGENCY DEPT VISIT SF MDM: CPT

## 2023-11-03 ASSESSMENT — ENCOUNTER SYMPTOMS
BACK PAIN: 0
CONSTIPATION: 0
SORE THROAT: 0
COUGH: 0
SHORTNESS OF BREATH: 0
CHEST TIGHTNESS: 0
ABDOMINAL PAIN: 0
VOMITING: 0
DIARRHEA: 0
NAUSEA: 0
EYE PAIN: 0

## 2023-11-03 ASSESSMENT — PAIN - FUNCTIONAL ASSESSMENT
PAIN_FUNCTIONAL_ASSESSMENT: NONE - DENIES PAIN
PAIN_FUNCTIONAL_ASSESSMENT: NONE - DENIES PAIN

## 2023-12-15 ENCOUNTER — HOSPITAL ENCOUNTER (OUTPATIENT)
Dept: GENERAL RADIOLOGY | Facility: CLINIC | Age: 73
End: 2023-12-15
Payer: MEDICARE

## 2023-12-15 ENCOUNTER — HOSPITAL ENCOUNTER (OUTPATIENT)
Facility: CLINIC | Age: 73
End: 2023-12-15
Payer: MEDICARE

## 2023-12-15 DIAGNOSIS — J44.9 CHRONIC OBSTRUCTIVE PULMONARY DISEASE, UNSPECIFIED COPD TYPE (HCC): ICD-10-CM

## 2023-12-15 DIAGNOSIS — J18.9 PNEUMONIA OF BOTH LUNGS DUE TO INFECTIOUS ORGANISM, UNSPECIFIED PART OF LUNG: ICD-10-CM

## 2023-12-15 PROCEDURE — 71046 X-RAY EXAM CHEST 2 VIEWS: CPT

## 2023-12-30 ENCOUNTER — APPOINTMENT (OUTPATIENT)
Dept: NUCLEAR MEDICINE | Age: 73
End: 2023-12-30
Payer: MEDICARE

## 2023-12-30 ENCOUNTER — HOSPITAL ENCOUNTER (OUTPATIENT)
Age: 73
Setting detail: OBSERVATION
Discharge: HOME | End: 2023-12-31
Attending: EMERGENCY MEDICINE | Admitting: EMERGENCY MEDICINE
Payer: MEDICARE

## 2023-12-30 ENCOUNTER — APPOINTMENT (OUTPATIENT)
Age: 73
End: 2023-12-30
Payer: MEDICARE

## 2023-12-30 ENCOUNTER — APPOINTMENT (OUTPATIENT)
Dept: GENERAL RADIOLOGY | Age: 73
End: 2023-12-30
Payer: MEDICARE

## 2023-12-30 DIAGNOSIS — R07.9 CHEST PAIN, UNSPECIFIED TYPE: Primary | ICD-10-CM

## 2023-12-30 PROBLEM — I49.5 SSS (SICK SINUS SYNDROME) (HCC): Status: ACTIVE | Noted: 2023-12-30

## 2023-12-30 LAB
ALBUMIN SERPL-MCNC: 3.8 G/DL (ref 3.5–5.2)
ALBUMIN/GLOB SERPL: 1.4 {RATIO} (ref 1–2.5)
ALP SERPL-CCNC: 63 U/L (ref 40–129)
ALT SERPL-CCNC: 14 U/L (ref 5–41)
ANION GAP SERPL CALCULATED.3IONS-SCNC: 13 MMOL/L (ref 9–17)
AST SERPL-CCNC: 14 U/L
BASOPHILS # BLD: 0.04 K/UL (ref 0–0.2)
BASOPHILS NFR BLD: 0 % (ref 0–2)
BILIRUB SERPL-MCNC: 0.5 MG/DL (ref 0.3–1.2)
BUN SERPL-MCNC: 13 MG/DL (ref 8–23)
CALCIUM SERPL-MCNC: 9.1 MG/DL (ref 8.6–10.4)
CHLORIDE SERPL-SCNC: 103 MMOL/L (ref 98–107)
CO2 SERPL-SCNC: 22 MMOL/L (ref 20–31)
CREAT SERPL-MCNC: 0.6 MG/DL (ref 0.7–1.2)
EOSINOPHIL # BLD: 0.22 K/UL (ref 0–0.44)
EOSINOPHILS RELATIVE PERCENT: 2 % (ref 1–4)
ERYTHROCYTE [DISTWIDTH] IN BLOOD BY AUTOMATED COUNT: 13.6 % (ref 11.8–14.4)
GFR SERPL CREATININE-BSD FRML MDRD: >60 ML/MIN/1.73M2
GLUCOSE SERPL-MCNC: 144 MG/DL (ref 70–99)
HCT VFR BLD AUTO: 46.9 % (ref 40.7–50.3)
HGB BLD-MCNC: 15.8 G/DL (ref 13–17)
IMM GRANULOCYTES # BLD AUTO: 0.06 K/UL (ref 0–0.3)
IMM GRANULOCYTES NFR BLD: 1 %
LYMPHOCYTES NFR BLD: 2.13 K/UL (ref 1.1–3.7)
LYMPHOCYTES RELATIVE PERCENT: 17 % (ref 24–43)
MAGNESIUM SERPL-MCNC: 1.8 MG/DL (ref 1.6–2.6)
MCH RBC QN AUTO: 31 PG (ref 25.2–33.5)
MCHC RBC AUTO-ENTMCNC: 33.7 G/DL (ref 28.4–34.8)
MCV RBC AUTO: 92 FL (ref 82.6–102.9)
MONOCYTES NFR BLD: 1.23 K/UL (ref 0.1–1.2)
MONOCYTES NFR BLD: 10 % (ref 3–12)
NEUTROPHILS NFR BLD: 70 % (ref 36–65)
NEUTS SEG NFR BLD: 9.08 K/UL (ref 1.5–8.1)
NRBC BLD-RTO: 0 PER 100 WBC
PLATELET # BLD AUTO: 175 K/UL (ref 138–453)
PMV BLD AUTO: 9.2 FL (ref 8.1–13.5)
POTASSIUM SERPL-SCNC: 3.9 MMOL/L (ref 3.7–5.3)
PROT SERPL-MCNC: 6.5 G/DL (ref 6.4–8.3)
RBC # BLD AUTO: 5.1 M/UL (ref 4.21–5.77)
SODIUM SERPL-SCNC: 138 MMOL/L (ref 135–144)
TROPONIN I SERPL HS-MCNC: 10 NG/L (ref 0–22)
TROPONIN I SERPL HS-MCNC: 9 NG/L (ref 0–22)
WBC OTHER # BLD: 12.8 K/UL (ref 3.5–11.3)

## 2023-12-30 PROCEDURE — 2500000003 HC RX 250 WO HCPCS: Performed by: HEALTH CARE PROVIDER

## 2023-12-30 PROCEDURE — 6370000000 HC RX 637 (ALT 250 FOR IP): Performed by: HEALTH CARE PROVIDER

## 2023-12-30 PROCEDURE — 96374 THER/PROPH/DIAG INJ IV PUSH: CPT

## 2023-12-30 PROCEDURE — 93005 ELECTROCARDIOGRAM TRACING: CPT | Performed by: HEALTH CARE PROVIDER

## 2023-12-30 PROCEDURE — 3430000000 HC RX DIAGNOSTIC RADIOPHARMACEUTICAL: Performed by: STUDENT IN AN ORGANIZED HEALTH CARE EDUCATION/TRAINING PROGRAM

## 2023-12-30 PROCEDURE — 83735 ASSAY OF MAGNESIUM: CPT

## 2023-12-30 PROCEDURE — G0378 HOSPITAL OBSERVATION PER HR: HCPCS

## 2023-12-30 PROCEDURE — 6360000002 HC RX W HCPCS: Performed by: STUDENT IN AN ORGANIZED HEALTH CARE EDUCATION/TRAINING PROGRAM

## 2023-12-30 PROCEDURE — 6370000000 HC RX 637 (ALT 250 FOR IP)

## 2023-12-30 PROCEDURE — 85025 COMPLETE CBC W/AUTO DIFF WBC: CPT

## 2023-12-30 PROCEDURE — A4216 STERILE WATER/SALINE, 10 ML: HCPCS | Performed by: HEALTH CARE PROVIDER

## 2023-12-30 PROCEDURE — 2580000003 HC RX 258: Performed by: STUDENT IN AN ORGANIZED HEALTH CARE EDUCATION/TRAINING PROGRAM

## 2023-12-30 PROCEDURE — A9500 TC99M SESTAMIBI: HCPCS | Performed by: STUDENT IN AN ORGANIZED HEALTH CARE EDUCATION/TRAINING PROGRAM

## 2023-12-30 PROCEDURE — 99285 EMERGENCY DEPT VISIT HI MDM: CPT

## 2023-12-30 PROCEDURE — 2580000003 HC RX 258

## 2023-12-30 PROCEDURE — 71045 X-RAY EXAM CHEST 1 VIEW: CPT

## 2023-12-30 PROCEDURE — 93017 CV STRESS TEST TRACING ONLY: CPT

## 2023-12-30 PROCEDURE — 2580000003 HC RX 258: Performed by: HEALTH CARE PROVIDER

## 2023-12-30 PROCEDURE — 93018 CV STRESS TEST I&R ONLY: CPT | Performed by: INTERNAL MEDICINE

## 2023-12-30 PROCEDURE — 78452 HT MUSCLE IMAGE SPECT MULT: CPT

## 2023-12-30 PROCEDURE — 84484 ASSAY OF TROPONIN QUANT: CPT

## 2023-12-30 PROCEDURE — 80053 COMPREHEN METABOLIC PANEL: CPT

## 2023-12-30 RX ORDER — TETRAKIS(2-METHOXYISOBUTYLISOCYANIDE)COPPER(I) TETRAFLUOROBORATE 1 MG/ML
12 INJECTION, POWDER, LYOPHILIZED, FOR SOLUTION INTRAVENOUS
Status: DISCONTINUED | OUTPATIENT
Start: 2023-12-30 | End: 2023-12-31 | Stop reason: HOSPADM

## 2023-12-30 RX ORDER — TAMSULOSIN HYDROCHLORIDE 0.4 MG/1
0.4 CAPSULE ORAL 2 TIMES DAILY
Status: DISCONTINUED | OUTPATIENT
Start: 2023-12-30 | End: 2023-12-31 | Stop reason: HOSPADM

## 2023-12-30 RX ORDER — POTASSIUM CHLORIDE 20 MEQ/1
40 TABLET, EXTENDED RELEASE ORAL PRN
Status: DISCONTINUED | OUTPATIENT
Start: 2023-12-30 | End: 2023-12-31 | Stop reason: HOSPADM

## 2023-12-30 RX ORDER — POTASSIUM CHLORIDE 7.45 MG/ML
10 INJECTION INTRAVENOUS PRN
Status: DISCONTINUED | OUTPATIENT
Start: 2023-12-30 | End: 2023-12-31 | Stop reason: HOSPADM

## 2023-12-30 RX ORDER — SODIUM CHLORIDE 0.9 % (FLUSH) 0.9 %
5-40 SYRINGE (ML) INJECTION PRN
Status: DISCONTINUED | OUTPATIENT
Start: 2023-12-30 | End: 2023-12-31 | Stop reason: HOSPADM

## 2023-12-30 RX ORDER — SODIUM CHLORIDE 0.9 % (FLUSH) 0.9 %
5-40 SYRINGE (ML) INJECTION PRN
Status: DISCONTINUED | OUTPATIENT
Start: 2023-12-30 | End: 2023-12-30

## 2023-12-30 RX ORDER — ATROPINE SULFATE 0.1 MG/ML
0.5 INJECTION INTRAVENOUS EVERY 5 MIN PRN
Status: DISCONTINUED | OUTPATIENT
Start: 2023-12-30 | End: 2023-12-30

## 2023-12-30 RX ORDER — ALBUTEROL SULFATE 2.5 MG/3ML
2.5 SOLUTION RESPIRATORY (INHALATION) 4 TIMES DAILY
COMMUNITY
Start: 2023-12-15

## 2023-12-30 RX ORDER — SODIUM CHLORIDE 9 MG/ML
500 INJECTION, SOLUTION INTRAVENOUS CONTINUOUS PRN
Status: DISCONTINUED | OUTPATIENT
Start: 2023-12-30 | End: 2023-12-30

## 2023-12-30 RX ORDER — ACETAMINOPHEN 650 MG/1
650 SUPPOSITORY RECTAL EVERY 6 HOURS PRN
Status: DISCONTINUED | OUTPATIENT
Start: 2023-12-30 | End: 2023-12-31 | Stop reason: HOSPADM

## 2023-12-30 RX ORDER — ENOXAPARIN SODIUM 100 MG/ML
40 INJECTION SUBCUTANEOUS DAILY
Status: DISCONTINUED | OUTPATIENT
Start: 2023-12-30 | End: 2023-12-30

## 2023-12-30 RX ORDER — ALBUTEROL SULFATE 90 UG/1
2 AEROSOL, METERED RESPIRATORY (INHALATION) EVERY 6 HOURS PRN
Status: DISCONTINUED | OUTPATIENT
Start: 2023-12-30 | End: 2023-12-31 | Stop reason: HOSPADM

## 2023-12-30 RX ORDER — MAGNESIUM SULFATE IN WATER 40 MG/ML
2000 INJECTION, SOLUTION INTRAVENOUS PRN
Status: DISCONTINUED | OUTPATIENT
Start: 2023-12-30 | End: 2023-12-31 | Stop reason: HOSPADM

## 2023-12-30 RX ORDER — METOPROLOL SUCCINATE 25 MG/1
50 TABLET, EXTENDED RELEASE ORAL NIGHTLY
Status: DISCONTINUED | OUTPATIENT
Start: 2023-12-30 | End: 2023-12-31 | Stop reason: HOSPADM

## 2023-12-30 RX ORDER — TETRAKIS(2-METHOXYISOBUTYLISOCYANIDE)COPPER(I) TETRAFLUOROBORATE 1 MG/ML
36 INJECTION, POWDER, LYOPHILIZED, FOR SOLUTION INTRAVENOUS
Status: DISCONTINUED | OUTPATIENT
Start: 2023-12-30 | End: 2023-12-31 | Stop reason: HOSPADM

## 2023-12-30 RX ORDER — METOPROLOL TARTRATE 1 MG/ML
5 INJECTION, SOLUTION INTRAVENOUS EVERY 5 MIN PRN
Status: DISCONTINUED | OUTPATIENT
Start: 2023-12-30 | End: 2023-12-30

## 2023-12-30 RX ORDER — REGADENOSON 0.08 MG/ML
0.4 INJECTION, SOLUTION INTRAVENOUS
Status: COMPLETED | OUTPATIENT
Start: 2023-12-30 | End: 2023-12-30

## 2023-12-30 RX ORDER — SODIUM CHLORIDE 9 MG/ML
INJECTION, SOLUTION INTRAVENOUS PRN
Status: DISCONTINUED | OUTPATIENT
Start: 2023-12-30 | End: 2023-12-31 | Stop reason: HOSPADM

## 2023-12-30 RX ORDER — MAGNESIUM HYDROXIDE/ALUMINUM HYDROXICE/SIMETHICONE 120; 1200; 1200 MG/30ML; MG/30ML; MG/30ML
30 SUSPENSION ORAL ONCE
Status: COMPLETED | OUTPATIENT
Start: 2023-12-30 | End: 2023-12-30

## 2023-12-30 RX ORDER — SODIUM CHLORIDE 0.9 % (FLUSH) 0.9 %
5-40 SYRINGE (ML) INJECTION EVERY 12 HOURS SCHEDULED
Status: DISCONTINUED | OUTPATIENT
Start: 2023-12-30 | End: 2023-12-31 | Stop reason: HOSPADM

## 2023-12-30 RX ORDER — SODIUM CHLORIDE 0.9 % (FLUSH) 0.9 %
10 SYRINGE (ML) INJECTION PRN
Status: DISCONTINUED | OUTPATIENT
Start: 2023-12-30 | End: 2023-12-31 | Stop reason: HOSPADM

## 2023-12-30 RX ORDER — ONDANSETRON 2 MG/ML
4 INJECTION INTRAMUSCULAR; INTRAVENOUS EVERY 6 HOURS PRN
Status: DISCONTINUED | OUTPATIENT
Start: 2023-12-30 | End: 2023-12-31 | Stop reason: HOSPADM

## 2023-12-30 RX ORDER — AMINOPHYLLINE 25 MG/ML
50 INJECTION, SOLUTION INTRAVENOUS PRN
Status: DISCONTINUED | OUTPATIENT
Start: 2023-12-30 | End: 2023-12-30

## 2023-12-30 RX ORDER — CLOPIDOGREL BISULFATE 75 MG/1
75 TABLET ORAL DAILY
Status: DISCONTINUED | OUTPATIENT
Start: 2023-12-30 | End: 2023-12-31 | Stop reason: HOSPADM

## 2023-12-30 RX ORDER — ONDANSETRON 4 MG/1
4 TABLET, ORALLY DISINTEGRATING ORAL EVERY 8 HOURS PRN
Status: DISCONTINUED | OUTPATIENT
Start: 2023-12-30 | End: 2023-12-31 | Stop reason: HOSPADM

## 2023-12-30 RX ORDER — RANOLAZINE 500 MG/1
1000 TABLET, EXTENDED RELEASE ORAL 2 TIMES DAILY
Status: DISCONTINUED | OUTPATIENT
Start: 2023-12-30 | End: 2023-12-31 | Stop reason: HOSPADM

## 2023-12-30 RX ORDER — ALBUTEROL SULFATE 90 UG/1
2 AEROSOL, METERED RESPIRATORY (INHALATION) PRN
Status: DISCONTINUED | OUTPATIENT
Start: 2023-12-30 | End: 2023-12-30

## 2023-12-30 RX ORDER — METOPROLOL SUCCINATE 25 MG/1
25 TABLET, EXTENDED RELEASE ORAL NIGHTLY
Status: DISCONTINUED | OUTPATIENT
Start: 2023-12-30 | End: 2023-12-30

## 2023-12-30 RX ORDER — ACETAMINOPHEN 325 MG/1
650 TABLET ORAL EVERY 6 HOURS PRN
Status: DISCONTINUED | OUTPATIENT
Start: 2023-12-30 | End: 2023-12-31 | Stop reason: HOSPADM

## 2023-12-30 RX ORDER — NITROGLYCERIN 0.4 MG/1
0.4 TABLET SUBLINGUAL EVERY 5 MIN PRN
Status: DISCONTINUED | OUTPATIENT
Start: 2023-12-30 | End: 2023-12-30

## 2023-12-30 RX ORDER — POLYETHYLENE GLYCOL 3350 17 G/17G
17 POWDER, FOR SOLUTION ORAL DAILY PRN
Status: DISCONTINUED | OUTPATIENT
Start: 2023-12-30 | End: 2023-12-31 | Stop reason: HOSPADM

## 2023-12-30 RX ORDER — ASPIRIN 81 MG/1
324 TABLET, CHEWABLE ORAL ONCE
Status: COMPLETED | OUTPATIENT
Start: 2023-12-30 | End: 2023-12-30

## 2023-12-30 RX ADMIN — TAMSULOSIN HYDROCHLORIDE 0.4 MG: 0.4 CAPSULE ORAL at 12:27

## 2023-12-30 RX ADMIN — ACETAMINOPHEN 650 MG: 325 TABLET ORAL at 20:35

## 2023-12-30 RX ADMIN — ALUMINUM HYDROXIDE, MAGNESIUM HYDROXIDE, AND SIMETHICONE 30 ML: 200; 200; 20 SUSPENSION ORAL at 01:28

## 2023-12-30 RX ADMIN — APIXABAN 5 MG: 5 TABLET, FILM COATED ORAL at 20:31

## 2023-12-30 RX ADMIN — RANOLAZINE 1000 MG: 500 TABLET, FILM COATED, EXTENDED RELEASE ORAL at 20:32

## 2023-12-30 RX ADMIN — SODIUM CHLORIDE, PRESERVATIVE FREE 10 ML: 5 INJECTION INTRAVENOUS at 10:30

## 2023-12-30 RX ADMIN — ISOSORBIDE MONONITRATE 90 MG: 30 TABLET, EXTENDED RELEASE ORAL at 12:28

## 2023-12-30 RX ADMIN — RANOLAZINE 1000 MG: 500 TABLET, FILM COATED, EXTENDED RELEASE ORAL at 12:28

## 2023-12-30 RX ADMIN — SODIUM CHLORIDE, PRESERVATIVE FREE 10 ML: 5 INJECTION INTRAVENOUS at 20:32

## 2023-12-30 RX ADMIN — SODIUM CHLORIDE, PRESERVATIVE FREE 10 ML: 5 INJECTION INTRAVENOUS at 17:07

## 2023-12-30 RX ADMIN — REGADENOSON 0.4 MG: 0.08 INJECTION, SOLUTION INTRAVENOUS at 10:43

## 2023-12-30 RX ADMIN — CLOPIDOGREL BISULFATE 75 MG: 75 TABLET ORAL at 12:28

## 2023-12-30 RX ADMIN — TAMSULOSIN HYDROCHLORIDE 0.4 MG: 0.4 CAPSULE ORAL at 20:32

## 2023-12-30 RX ADMIN — FAMOTIDINE 20 MG: 10 INJECTION, SOLUTION INTRAVENOUS at 01:31

## 2023-12-30 RX ADMIN — ASPIRIN 324 MG: 81 TABLET, CHEWABLE ORAL at 01:30

## 2023-12-30 RX ADMIN — APIXABAN 5 MG: 5 TABLET, FILM COATED ORAL at 12:28

## 2023-12-30 ASSESSMENT — PAIN DESCRIPTION - DESCRIPTORS: DESCRIPTORS: THROBBING

## 2023-12-30 ASSESSMENT — PAIN - FUNCTIONAL ASSESSMENT: PAIN_FUNCTIONAL_ASSESSMENT: ACTIVITIES ARE NOT PREVENTED

## 2023-12-30 ASSESSMENT — PAIN DESCRIPTION - ORIENTATION: ORIENTATION: LOWER

## 2023-12-30 ASSESSMENT — PAIN DESCRIPTION - LOCATION: LOCATION: HEAD;BACK

## 2023-12-30 ASSESSMENT — HEART SCORE: ECG: 1

## 2023-12-30 ASSESSMENT — PAIN SCALES - GENERAL: PAINLEVEL_OUTOF10: 3

## 2023-12-30 NOTE — ED PROVIDER NOTES
[facility]     Emergency Department     Faculty Note/ Attestation      Pt Name: Irwin Castro                                       MRN: [id number]  Birthdate 1950  Date of evaluation: 12/30/2023  Note Started: 1:33 AM EST    Patients PCP:    Waleska Castillo, CHRISTIANA - CNP    Attestation  I performed a history and physical examination of the patient and discussed management with the resident. I reviewed the resident’s note and agree with the documented findings and plan of care. Any areas of disagreement are noted on the chart. I was personally present for the key portions of any procedures. I have documented in the chart those procedures where I was not present during the key portions. I have reviewed the emergency nurses triage note. I agree with the chief complaint, past medical history, past surgical history, allergies, medications, social and family history as documented unless otherwise noted below.    For Physician Assistant/ Nurse Practitioner cases/documentation I have personally evaluated this patient and have completed at least one if not all key elements of the E/M (history, physical exam, and MDM). Additional findings are as noted.    Initial Screens:     Heart Score for chest pain patients  History: Moderately Suspicious  ECG: Non-Specifc repolarization disturbance/LBTB/PM  Patient Age: > 65 years  Risk Factors: > 3 Risk factors or history of atherosclerotic disease*  Troponin: < 1X normal limit  Heart Score Total: 6  [place] Coma Scale  Eye Opening: Spontaneous  Best Verbal Response: Oriented  Best Motor Response: Obeys commands  Priya Coma Scale Score: 15    Vitals:    Vitals:    12/30/23 0114   BP: 118/65   Pulse: 87   Resp: 13   Temp: 97.8 °F (36.6 °C)   SpO2: 95%       CHIEF COMPLAINT       Chief Complaint   Patient presents with   • Chest Pain       Patient 73-year-old male with multiple medical comorbidities including known CAD who presents with chest pain 
awaiting second troponin. [JS]   0307 Troponin, High Sensitivity: 9 [MW]   0333 Patient's cardiac workup was unremarkable, however due to patient's heart score of 6 and admit to the observation unit for cardiac evaluation. [MW]      ED Course User Index  [JS] Miguel A Del Rio,   [MW] Reed Watt MD  [WK] Roel Anderson DO       OUTSTANDING TASKS / RECOMMENDATIONS:    Troponin pending  Admission      FINAL IMPRESSION:     1. Chest pain, unspecified type        DISPOSITION:         DISPOSITION:  []  Discharge   []  Transfer -    [x]  Admission -  observation   []  Against Medical Advice   []  Eloped   FOLLOW-UP: No follow-up provider specified.   DISCHARGE MEDICATIONS: New Prescriptions    No medications on file           Reed Watt MD  Emergency Medicine Resident  [facility]     
occasionally words are mis-transcribed.)

## 2023-12-30 NOTE — CONSULTS
Plavix and Eliquis  If stress test is positive then hold Eliquis on Monday for possible heart cath on Tuesday.  Continue amiodarone 90 mg daily, Toprol-XL 25 mg daily and Ranexa 1000 mg twice daily  Rest of the management as per the primary team  Will continue to follow    Please wait for final attestation from rounding attending     Rashida Hui MD.  Fellow, cardiovascular disease   [facility], [place], [place].     Attending note,   The patient was seen and examined, agree with evaluation and plan documented above by the resident. Chest pain. H/O CAD and PCI before. H/O SSS s/p PM. ECG possible junctional rhythm with nonspecific ST changes. Detailed discussion with patient. Continue current medications. Plan for Cardiolite stress test.

## 2023-12-30 NOTE — ED NOTES
Cardiology at bedside   
ED to inpatient nurses report        Chief Complaint:      Chief Complaint   Patient presents with    Chest Pain      Present to ED from: home      MOA: EMS     LOC: alert and orientated to name, place, date  Mobility: Independent  Oxygen Baseline: room air                   Current needs required: N/A   Pending ED orders: Admission orders   Present condition: Patient is alert and oriented x4.     Why did the patient come to the ED? Patient present to the ED by EMS for chest pain. He reports that he experienced \"heaviness\" on the left side of his chest around 9 pm. He reports taking 3 of Nitro with relief. He has a [employer] pace maker that was placed back in 2019. He is currently on Plavix and Eliquis. Patient denies nausea, vomiting, diarrhea or SOB. He reports lower abdominal pain that started around the same time.  Patient is alert and oriented X4. EKG completed. Patient placed on telemetry. Call light within reach.   What is the plan? Observation   Any procedures or intervention occur? EKG, labs, aspirin, pepcid and Maalox  Any safety concerns??     Mental Status:        Psych Assessment:   Psychosocial  Psychosocial (WDL): Within Defined Limits  Vital signs          Vitals:     12/30/23 0210 12/30/23 0225 12/30/23 0240 12/30/23 0314   BP:       (!) 150/80   Pulse: 76 69 83 62   Resp:   17 10     Temp:           SpO2: 97% 94% 95% 94%   Weight:                 Vitals:  Patient Vitals for the past 24 hrs:    BP Temp Pulse Resp SpO2 Weight   12/30/23 0314 (!) 150/80 -- 62 -- 94 % --   12/30/23 0240 -- -- 83 10 95 % --   12/30/23 0225 -- -- 69 17 94 % --   12/30/23 0210 -- -- 76 -- 97 % --   12/30/23 0155 114/72 -- 69 -- 96 % --   12/30/23 0142 -- -- 87 -- -- --   12/30/23 0140 127/72 -- 71 16 95 % --   12/30/23 0136 -- -- -- -- -- 98.9 kg (218 lb)   12/30/23 0125 116/67 -- 88 18 95 % --   12/30/23 0114 118/65 97.8 °F (36.6 °C) 87 13 95 % --      Visit Vitals  BP (!) 150/80   Pulse 62   Temp 97.8 °F (36.6 °C) 
ED to inpatient nurses report    Chief Complaint   Patient presents with    Chest Pain      Present to ED from home  LOC: alert and orientated to name, place, date  Vital signs   Vitals:    12/30/23 0606 12/30/23 0621 12/30/23 0630 12/30/23 0634   BP:   (!) 163/84    Pulse: 68 69 68 69   Resp: 19 19 23 19   Temp:       SpO2: 92% 92%  93%   Weight:          Oxygen Baseline none    Current needs required none   LDAs:   Peripheral IV 12/30/23 Left Wrist (Active)     Mobility: Independent  Pending ED orders: none  Present condition: stable  Code Status: [unfilled]   Consults:  []  Hospitalist  Completed  [] yes [] no  []  Medicine  Completed  [] yes [] No  [x]  Cardiology  Completed  [x] yes [] No  []  GI   Completed  [] yes [] No  []  Neurology  Completed  [] yes [] No  []  Nephrology Completed  [] yes [] No  []  Vascular  Completed  [] yes [] No   []  Surgery  Completed  [] yes [] No   []  Urology  Completed  [] yes [] No   []  Plastics  Completed  [] yes [] No   []  ENT  Completed  [] yes [] No   [x]  Other obs  Completed  [x] yes [] No  Pertinent event(s) stress done, pending   Pertinent event(s) none  Electronically signed by Jimmy Bermudez RN on 12/30/2023 at 1:30 PM    
Patient given urinal   
Patient given urinal   
Patient present to the ED by EMS for chest pain. He reports that he experienced \"heaviness\" on the left side of his chest around 9 pm. He reports taking 3 of Nitro with relief. He has a [employer] pace maker that was placed back in 2019. He is currently on Plavix and Eliquis. Patient denies nausea, vomiting, diarrhea or SOB. He reports lower abdominal pain that started around the same time.  Patient is alert and oriented X4. EKG completed. Patient placed on telemetry. Call light within reach.   
Pt. Remains out of room for testing   
Pt. Resting on stretcher, eyes closed, RR even and non-labored  Pt. Updated on POC  Will continue to monitor     
Pt. Returns from nuc med via stretcher   Pt. Requesting to sit in chair off monitor   Pt. Requesting food,obs notified, will place diet order   Pt. Resting on stretcher, eyes open, RR even and non-labored  Pt. Updated on POC  Will continue to monitor     
Pt. To nuc med via stretcher, transport andnurse   
Report given to Nelson STUART. All questions answered   
The following labs were labeled with appropriate pt sticker and tubed to lab:     [] Blue     [] Lavender   [] on ice  [x] Green/yellow  [] Green/black [] on ice  [] Grey  [] on ice  [] Yellow  [] Red  [] Pink  [] Type/ Screen  [] ABG  [] VBG    [] COVID-19 swab    [] Rapid  [] PCR  [] Flu swab  [] Peds Viral Panel     [] Urine Sample  [] Fecal Sample  [] Pelvic Cultures  [] Blood Cultures  [] X 2  [] STREP Cultures  [] Wound Cultures   
X-ray at bed side  
acid (EFFER-K) effervescent tablet 40 mEq     potassium chloride 10 mEq/100 mL IVPB (Peripheral Line)    magnesium sulfate 2000 mg in 50 mL IVPB premix    DISCONTD: enoxaparin (LOVENOX) injection 40 mg     Order Specific Question:   Indication of Use     Answer:   Prophylaxis-DVT/PE    OR Linked Order Group     ondansetron (ZOFRAN-ODT) disintegrating tablet 4 mg     ondansetron (ZOFRAN) injection 4 mg    polyethylene glycol (GLYCOLAX) packet 17 g    OR Linked Order Group     acetaminophen (TYLENOL) tablet 650 mg     acetaminophen (TYLENOL) suppository 650 mg       SURGICAL HISTORY       Past Surgical History:   Procedure Laterality Date    CARDIAC CATHETERIZATION  05/17/2018    LAD PROX 50% / MID DIFFUSE 45-50% / LCX PROX 50% WITH PATENT RCA STENT    CARDIAC CATHETERIZATION  2011    CARDIAC CATHETERIZATION  02/16/2023    COLONOSCOPY      COLONOSCOPY  10/30/2020    COLONOSCOPY N/A 10/30/2020    COLORECTAL CANCER SCREENING, NOT HIGH RISK performed by Darren Chun MD at [facility] OR    CORONARY ANGIOPLASTY WITH STENT PLACEMENT  04/23/2009    Mid RCA (Liberte 3.5 x 32) Proximal RCA (4x20)    CORONARY ANGIOPLASTY WITH STENT PLACEMENT  04/25/2022    DR STOUT  / Successful PTCA-BERNADINE to mid RCA. / 40% proximal Mid LAD stenosis (same as 2019).    CORONARY ANGIOPLASTY WITH STENT PLACEMENT  02/16/2023    In-stent re-stenosis mid RCA underwent BERNADINE / Non-obstructive LAD and LCX disease unchanged from 2018    PACEMAKER INSERTION  07/12/2019    Dr. Stout  /  [employer] DUAL CHAMBER       PAST MEDICAL HISTORY       Past Medical History:   Diagnosis Date    Abnormal cardiovascular stress test     Anxiety disorder 08/31/2020    Atrial flutter (HCC) 05/17/2018    ELIQUIS    CAD (coronary artery disease)     Callus of foot     rt     COPD (chronic obstructive pulmonary disease) (HCC)     Former smoker     GERD (gastroesophageal reflux disease)     H/O cardiovascular stress test 09/2017    H/O non-A, non-B viral hepatitis

## 2023-12-30 NOTE — PLAN OF CARE
Problem: Chronic Conditions and Co-morbidities  Goal: Patient's chronic conditions and co-morbidity symptoms are monitored and maintained or improved  Outcome: Progressing  Flowsheets (Taken 12/30/2023 1700)  Care Plan - Patient's Chronic Conditions and Co-Morbidity Symptoms are Monitored and Maintained or Improved:   Monitor and assess patient's chronic conditions and comorbid symptoms for stability, deterioration, or improvement   Collaborate with multidisciplinary team to address chronic and comorbid conditions and prevent exacerbation or deterioration   Update acute care plan with appropriate goals if chronic or comorbid symptoms are exacerbated and prevent overall improvement and discharge     Problem: Discharge Planning  Goal: Discharge to home or other facility with appropriate resources  Outcome: Progressing  Flowsheets (Taken 12/30/2023 1700)  Discharge to home or other facility with appropriate resources:   Identify barriers to discharge with patient and caregiver   Arrange for needed discharge resources and transportation as appropriate   Identify discharge learning needs (meds, wound care, etc)   Refer to discharge planning if patient needs post-hospital services based on physician order or complex needs related to functional status, cognitive ability or social support system

## 2023-12-30 NOTE — H&P
[facility]  CDU / OBSERVATION ENCOUNTER  Resident NOTE     Pt Name: Irwin Castro  MRN: [id number]  Birthdate 1950  Date of evaluation: 12/30/23  Patient's PCP is :  Waleska Castillo APRN - [facility]    CHIEF COMPLAINT       Chief Complaint   Patient presents with    Chest Pain         HISTORY OF PRESENT ILLNESS    Irwin Castro is a 73 y.o. male who presents left sided chest pain while at rest X 4hours prior to presentation. Took 1 nitro with little relief, 2nd with significant improvement.  Patient then felt fatigued and took a nap.  States he woke approximately an hour an hour and a half later with return of chest pain and shortness of breath called EMS.    Previous cardiac catheterization on 02/2023 with in stent restenosis of mid RCA stent requiring PCI, nonobstructive coronary disease of LAD and circumflex that has been stable since 2018, normal LVEF.     ACS workup in the ED with no significant findings with normal troponins x 2.  Heart score of 6.  Placed in the observation unit for cardiology consultation.     Location/Symptom: chest pain   Timing/Onset: last night  Provocation: none   Quality: pressure like   Radiation: to left arm causing numbness   Severity: severe  Timing/Duration: intermittent   Modifying Factors: improved with nitro     History was obtained in part through review of the ED chart. When possible, a direct discussion was had with ED nurses, residents, and attendings  REVIEW OF SYSTEMS     This morning during assessment:   General ROS - No fevers, No malaise   Ophthalmic ROS - No discharge, No changes in vision  ENT ROS -  No sore throat, No rhinorrhea,   Respiratory ROS - no shortness of breath, no cough, no  wheezing  Cardiovascular ROS - mild chest pain, no dyspnea on exertion  Gastrointestinal ROS - No abdominal pain, no nausea or vomiting, no change in bowel habits, no black or bloody stools  Genito-Urinary ROS - No dysuria, trouble voiding, or

## 2023-12-30 NOTE — DISCHARGE SUMMARY
CDU Discharge Summary        Patient:  Irwin Castro  YOB: 1950    MRN: [id number]   Acct: [id number]    Primary Care Physician: Waleska Castillo APRN - CNP    Admit date:  12/30/2023  1:05 AM  Discharge date: 12/31/2023 11:32 AM     Discharge Diagnoses:     Acute chest pain due to underlying coronary artery disease  Improved with medication, and rest    Follow-up:  Call today/tomorrow for a follow up appointment with Waleska Castillo APRN - CNP , or return to the Emergency Room with worsening symptoms    Stressed to patient the importance of following up with primary care doctor for further workup/management of symptoms.  Pt verbalizes understanding and agrees with plan.    Discharge Medications:  Changes to medications            Medication List        CHANGE how you take these medications      metFORMIN 500 MG tablet  Commonly known as: GLUCOPHAGE  take 1 tablet by mouth WITH BREAKFAST then 2 tablets WITH EVENING MEAL  What changed: See the new instructions.            CONTINUE taking these medications      * albuterol sulfate  (90 Base) MCG/ACT inhaler  Commonly known as: PROVENTIL;VENTOLIN;PROAIR  Inhale 2 puffs into the lungs every 6 hours as needed for Shortness of Breath     * albuterol (2.5 MG/3ML) 0.083% nebulizer solution  Commonly known as: PROVENTIL     apixaban 5 MG Tabs tablet  Commonly known as: ELIQUIS  Take 1 tablet by mouth 2 times daily     * blood glucose test strips  Testing qd -bid prn     * blood glucose test strips strip  Commonly known as: ASCENS[employer] AUTODISC VI;ONE TOUCH ULTRA TEST VI  1 each by Other route 2 times daily As needed.     Blood Pressure Cuff Misc  1 each by Does not apply route daily as needed (monitoring)     clopidogrel 75 MG tablet  Commonly known as: Plavix  Take 1 tablet by mouth daily     fluticasone-salmeterol 250-50 MCG/ACT Aepb diskus inhaler  Commonly known as: ADVAIR     ipratropium 0.02 % nebulizer solution  Commonly known as:

## 2023-12-31 VITALS
BODY MASS INDEX: 29.94 KG/M2 | TEMPERATURE: 97.6 F | WEIGHT: 213.85 LBS | SYSTOLIC BLOOD PRESSURE: 107 MMHG | DIASTOLIC BLOOD PRESSURE: 63 MMHG | RESPIRATION RATE: 19 BRPM | OXYGEN SATURATION: 97 % | HEART RATE: 66 BPM | HEIGHT: 71 IN

## 2023-12-31 PROCEDURE — 6370000000 HC RX 637 (ALT 250 FOR IP)

## 2023-12-31 PROCEDURE — G0378 HOSPITAL OBSERVATION PER HR: HCPCS

## 2023-12-31 PROCEDURE — 2580000003 HC RX 258

## 2023-12-31 RX ADMIN — CLOPIDOGREL BISULFATE 75 MG: 75 TABLET ORAL at 09:01

## 2023-12-31 RX ADMIN — APIXABAN 5 MG: 5 TABLET, FILM COATED ORAL at 09:01

## 2023-12-31 RX ADMIN — SODIUM CHLORIDE, PRESERVATIVE FREE 10 ML: 5 INJECTION INTRAVENOUS at 09:01

## 2023-12-31 RX ADMIN — TAMSULOSIN HYDROCHLORIDE 0.4 MG: 0.4 CAPSULE ORAL at 09:01

## 2023-12-31 RX ADMIN — ISOSORBIDE MONONITRATE 90 MG: 30 TABLET, EXTENDED RELEASE ORAL at 09:00

## 2023-12-31 RX ADMIN — RANOLAZINE 1000 MG: 500 TABLET, FILM COATED, EXTENDED RELEASE ORAL at 09:01

## 2023-12-31 NOTE — PROGRESS NOTES
[facility]  CDU / OBSERVATION eNCOUnter  Attending NOte       I performed a history and physical examination of the patient and discussed management with the resident.  This patient was placed in the observation unit for reevaluation for possible admission to the hospital. I reviewed the resident’s note and agree with the documented findings and plan of care. Any areas of disagreement are noted on the chart. I was personally present for the key portions of any procedures. I have documented in the chart those procedures where I was not present during the key portions. I have reviewed the nurses notes. I agree with the chief complaint, past medical history, past surgical history, allergies, medications, social and family history as documented unless otherwise noted below.    The patient was placed in the observation unit for reevaluation for possible admission to the hospital.      The Family history, social history, and ROS are effectively unchanged since admission unless noted elsewhere in the chart.    73-year-old male here with chest pain. Patient just completed his stress test. He states it was quite painful, but he is feeling better now. Ordering lunch. Plan to reassess this afternoon.     Tamara Emery MD  Attending Emergency  Physician    
   [facility]  CDU / OBSERVATION ENCOUNTER  ATTENDING NOTE     Discussed with cardiology.  Patient cleared by cardiology for discharge.  Testing completed.  Patient discharged prior to my evaluation    This patient was placed in the observation unit for reevaluation for possible admission to the hospital    Patient with complaints of chest pain.  Patient had exercise stress test performed yesterday.  Awaiting results.  Patient had significant chest discomfort during testing and that did resolve    Preliminary images on chart but final report not yet available.    Vlad Bowie MD  Attending Emergency  Physician    
OBS/CDU   RESIDENT NOTE      Patients PCP is:  Waleska Castillo, CHRISTIANA - GAYLA        SUBJECTIVE      No acute events overnight. Had stress test yesterday which was negative. Continues to be chest pain free. Had low grade fever last night which came down with tylenol. Declines COVID/Flu testing. Has been able to tolerate a full diet without nausea or vomiting. The patient is urinating on his own and is passing flatus. Denies fever, chills, nausea, vomiting, chest pain, shortness of breath, abdominal pain, focal weakness, numbness, tingling, urinary/bowel symptoms, vision changes, visual hallucinations, or headache.       PHYSICAL EXAM      General: NAD, AO X 3  Heent: EMOI, PERRL  Neck: SUPPLE, NO JVD  Cardiovascular: RRR, S1S2  Pulmonary: CTAB, NO SOB  Abdomen: SOFT, NTTP, ND, +BS  Extremities: +2/4 PULSES DISTAL, NO SWELLING  Neuro / Psych: NO NUMBNESS OR TINGLING, MENTATION AT BASELINE    PERTINENT TEST /EXAMS      I have reviewed all available laboratory results.    MEDICATIONS CURRENT   sodium chloride flush 0.9 % injection 5-40 mL, 2 times per day  sodium chloride flush 0.9 % injection 5-40 mL, PRN  0.9 % sodium chloride infusion, PRN  potassium chloride (KLOR-CON M) extended release tablet 40 mEq, PRN   Or  potassium bicarb-citric acid (EFFER-K) effervescent tablet 40 mEq, PRN   Or  potassium chloride 10 mEq/100 mL IVPB (Peripheral Line), PRN  magnesium sulfate 2000 mg in 50 mL IVPB premix, PRN  ondansetron (ZOFRAN-ODT) disintegrating tablet 4 mg, Q8H PRN   Or  ondansetron (ZOFRAN) injection 4 mg, Q6H PRN  polyethylene glycol (GLYCOLAX) packet 17 g, Daily PRN  acetaminophen (TYLENOL) tablet 650 mg, Q6H PRN   Or  acetaminophen (TYLENOL) suppository 650 mg, Q6H PRN  albuterol sulfate HFA (PROVENTIL;VENTOLIN;PROAIR) 108 (90 Base) MCG/ACT inhaler 2 puff, Q6H PRN  apixaban (ELIQUIS) tablet 5 mg, BID  clopidogrel (PLAVIX) tablet 75 mg, Daily  isosorbide mononitrate (IMDUR) extended release tablet 90 mg, 
obvious abnormality.  Neck: no JVD, trachea midline, no adenopathy  Lungs: Clear to auscultation  Heart: Regular rate and rhythm, s1/s2 auscultated, no murmurs  Abdomen: soft, non-tender, bowel sounds active  Extremities: no edema  Neurologic: not done        Assessment / Acute Cardiac Problems:   Hx of CAD s/p stent to RCA with in-stent stenosis in April 2022 s/p PTCA/BERNADINE and again in February 2023  Hx of paroxysmal atrial flutter/atrial fibrillation, on Eliquis  Hx of sick sinus syndrome s/p [employer] dual-chamber PPM 07/19  Peripheral vascular disease  VT on pacer check on 09/2022  Hyperlipidemia    Patient Active Problem List:     Essential hypertension     Mixed hyperlipidemia     CAD (coronary artery disease)     Other emphysema ([facility])     Primary osteoarthritis involving multiple joints     Class 1 obesity in adult     Rotator cuff tear     Shoulder pain, bilateral     Callus of foot     Type 2 diabetes mellitus without complication, without long-term current use of insulin ([facility])     Shoulder arthritis     RUQ pain     Atrial flutter ([facility])     Benign prostatic hyperplasia without lower urinary tract symptoms     Long term use of drug     S/P placement of cardiac pacemaker     Presence of stent in coronary artery in patient with coronary artery disease     Gastroesophageal reflux disease     Anxiety disorder     Tremor, unspecified     Community acquired pneumonia     Hypoxia     S/P angioplasty with stent     Chest pain     SOB (shortness of breath) on exertion     Near syncope     SSS (sick sinus syndrome) ([facility])      Plan of Treatment:   Stress test reviewed and nuc portion in paper chart.  No ischemia EF 52%   Continue current medications.    No further cardiac workup at this time.  Follow up as outpt     Electronically signed by CHRISTIANA STEVENS CNP on 12/31/2023 at 10:45 AM  [facility].  [phone]

## 2023-12-31 NOTE — DISCHARGE SUMMARY
Discharge teaching and instructions completed with patient using teachback method. AVS reviewed; No new prescriptions given to patient. Patient voiced understanding regarding home medications, follow up appointments, and care of self at home. Discharged home with all belongings. All questions answered.

## 2023-12-31 NOTE — PLAN OF CARE
Problem: Chronic Conditions and Co-morbidities  Goal: Patient's chronic conditions and co-morbidity symptoms are monitored and maintained or improved  12/31/2023 1121 by Ragini Muhammad RN  Outcome: Adequate for Discharge  12/31/2023 0312 by Dereck Nolan RN  Outcome: Progressing     Problem: Discharge Planning  Goal: Discharge to home or other facility with appropriate resources  12/31/2023 1121 by Ragini Muhammad RN  Outcome: Adequate for Discharge  12/31/2023 0312 by Dereck Nolan RN  Outcome: Progressing

## 2023-12-31 NOTE — PLAN OF CARE
Problem: Chronic Conditions and Co-morbidities  Goal: Patient's chronic conditions and co-morbidity symptoms are monitored and maintained or improved  12/31/2023 0312 by Dereck Nolan RN  Outcome: Progressing  12/30/2023 1819 by Ragini Muhammad RN  Outcome: Progressing  Flowsheets (Taken 12/30/2023 1700)  Care Plan - Patient's Chronic Conditions and Co-Morbidity Symptoms are Monitored and Maintained or Improved:   Monitor and assess patient's chronic conditions and comorbid symptoms for stability, deterioration, or improvement   Collaborate with multidisciplinary team to address chronic and comorbid conditions and prevent exacerbation or deterioration   Update acute care plan with appropriate goals if chronic or comorbid symptoms are exacerbated and prevent overall improvement and discharge     Problem: Discharge Planning  Goal: Discharge to home or other facility with appropriate resources  12/31/2023 0312 by Dereck Nolan RN  Outcome: Progressing  12/30/2023 1819 by Ragini Muhammad RN  Outcome: Progressing  Flowsheets (Taken 12/30/2023 1700)  Discharge to home or other facility with appropriate resources:   Identify barriers to discharge with patient and caregiver   Arrange for needed discharge resources and transportation as appropriate   Identify discharge learning needs (meds, wound care, etc)   Refer to discharge planning if patient needs post-hospital services based on physician order or complex needs related to functional status, cognitive ability or social support system

## 2024-01-02 LAB
EKG ATRIAL RATE: 87 BPM
EKG Q-T INTERVAL: 390 MS
EKG QRS DURATION: 96 MS
EKG QTC CALCULATION (BAZETT): 471 MS
EKG R AXIS: 90 DEGREES
EKG T AXIS: -47 DEGREES
EKG VENTRICULAR RATE: 88 BPM
STRESS BASELINE DIAS BP: 69 MMHG
STRESS BASELINE HR: 68 BPM
STRESS BASELINE SYS BP: 135 MMHG
STRESS ESTIMATED WORKLOAD: 1 METS
STRESS PEAK DIAS BP: 79 MMHG
STRESS PEAK SYS BP: 158 MMHG
STRESS PERCENT HR ACHIEVED: 69 %
STRESS POST PEAK HR: 101 BPM
STRESS RATE PRESSURE PRODUCT: NORMAL BPM*MMHG
STRESS TARGET HR: 147 BPM

## 2024-01-02 RX ORDER — TETRAKIS(2-METHOXYISOBUTYLISOCYANIDE)COPPER(I) TETRAFLUOROBORATE 1 MG/ML
36 INJECTION, POWDER, LYOPHILIZED, FOR SOLUTION INTRAVENOUS
Status: COMPLETED | OUTPATIENT
Start: 2024-01-02 | End: 2024-01-02

## 2024-01-02 RX ORDER — SODIUM CHLORIDE 0.9 % (FLUSH) 0.9 %
10 SYRINGE (ML) INJECTION PRN
Status: ACTIVE | OUTPATIENT
Start: 2024-01-02

## 2024-01-02 RX ORDER — TETRAKIS(2-METHOXYISOBUTYLISOCYANIDE)COPPER(I) TETRAFLUOROBORATE 1 MG/ML
12 INJECTION, POWDER, LYOPHILIZED, FOR SOLUTION INTRAVENOUS
Status: COMPLETED | OUTPATIENT
Start: 2024-01-02 | End: 2024-01-02

## 2024-01-02 RX ADMIN — TETRAKIS(2-METHOXYISOBUTYLISOCYANIDE)COPPER(I) TETRAFLUOROBORATE 36 MILLICURIE: 1 INJECTION, POWDER, LYOPHILIZED, FOR SOLUTION INTRAVENOUS at 10:45

## 2024-01-02 RX ADMIN — TETRAKIS(2-METHOXYISOBUTYLISOCYANIDE)COPPER(I) TETRAFLUOROBORATE 12 MILLICURIE: 1 INJECTION, POWDER, LYOPHILIZED, FOR SOLUTION INTRAVENOUS at 09:15

## 2024-01-02 RX ADMIN — Medication 10 ML: at 09:15

## 2024-01-02 RX ADMIN — Medication 10 ML: at 10:45

## 2024-01-15 ENCOUNTER — HOSPITAL ENCOUNTER (OUTPATIENT)
Dept: ULTRASOUND IMAGING | Age: 74
Discharge: HOME | End: 2024-01-17
Payer: MEDICARE

## 2024-01-15 DIAGNOSIS — R39.15 URINARY URGENCY: ICD-10-CM

## 2024-01-15 DIAGNOSIS — R39.15 URGENCY OF URINATION: ICD-10-CM

## 2024-01-15 DIAGNOSIS — N50.89 SCROTAL SWELLING: ICD-10-CM

## 2024-01-15 DIAGNOSIS — N50.89 SWOLLEN SCROTUM: ICD-10-CM

## 2024-01-15 PROCEDURE — 76857 US EXAM PELVIC LIMITED: CPT

## 2024-01-15 PROCEDURE — 93976 VASCULAR STUDY: CPT

## 2024-04-20 ENCOUNTER — APPOINTMENT (OUTPATIENT)
Dept: CT IMAGING | Age: 74
DRG: 068 | End: 2024-04-20
Payer: MEDICARE

## 2024-04-20 ENCOUNTER — HOSPITAL ENCOUNTER (INPATIENT)
Age: 74
LOS: 1 days | Discharge: HOME | DRG: 068 | End: 2024-04-21
Attending: STUDENT IN AN ORGANIZED HEALTH CARE EDUCATION/TRAINING PROGRAM | Admitting: INTERNAL MEDICINE
Payer: MEDICARE

## 2024-04-20 DIAGNOSIS — R29.90 STROKE-LIKE SYMPTOMS: ICD-10-CM

## 2024-04-20 DIAGNOSIS — I49.5 SSS (SICK SINUS SYNDROME) (HCC): ICD-10-CM

## 2024-04-20 DIAGNOSIS — R20.0 NUMBNESS: Primary | ICD-10-CM

## 2024-04-20 PROBLEM — Z95.0 CARDIAC PACEMAKER IN SITU: Status: ACTIVE | Noted: 2024-04-20

## 2024-04-20 PROBLEM — I25.10 CAD S/P PERCUTANEOUS CORONARY ANGIOPLASTY: Status: ACTIVE | Noted: 2024-04-20

## 2024-04-20 PROBLEM — Z98.61 CAD S/P PERCUTANEOUS CORONARY ANGIOPLASTY: Status: ACTIVE | Noted: 2024-04-20

## 2024-04-20 LAB
ALBUMIN SERPL-MCNC: 4.2 G/DL (ref 3.5–5.2)
ALP SERPL-CCNC: 75 U/L (ref 40–129)
ALT SERPL-CCNC: 14 U/L (ref 5–41)
ANION GAP SERPL CALCULATED.3IONS-SCNC: 13 MMOL/L (ref 9–17)
AST SERPL-CCNC: 17 U/L
BASOPHILS # BLD: 0.1 K/UL (ref 0–0.2)
BASOPHILS NFR BLD: 1 % (ref 0–2)
BILIRUB SERPL-MCNC: 0.4 MG/DL (ref 0.3–1.2)
BUN SERPL-MCNC: 16 MG/DL (ref 8–23)
CALCIUM SERPL-MCNC: 9.7 MG/DL (ref 8.6–10.4)
CHLORIDE SERPL-SCNC: 101 MMOL/L (ref 98–107)
CO2 SERPL-SCNC: 29 MMOL/L (ref 20–31)
CREAT SERPL-MCNC: 0.9 MG/DL (ref 0.7–1.2)
EOSINOPHIL # BLD: 0.3 K/UL (ref 0–0.4)
EOSINOPHILS RELATIVE PERCENT: 3 % (ref 0–4)
ERYTHROCYTE [DISTWIDTH] IN BLOOD BY AUTOMATED COUNT: 14.9 % (ref 11.5–14.9)
GFR SERPL CREATININE-BSD FRML MDRD: >90 ML/MIN/1.73M2
GLUCOSE BLD-MCNC: 131 MG/DL (ref 75–110)
GLUCOSE BLD-MCNC: 143 MG/DL (ref 75–110)
GLUCOSE BLD-MCNC: 149 MG/DL (ref 75–110)
GLUCOSE SERPL-MCNC: 141 MG/DL (ref 70–99)
HCT VFR BLD AUTO: 48 % (ref 41–53)
HGB BLD-MCNC: 15.9 G/DL (ref 13.5–17.5)
INR PPP: 1.3
LYMPHOCYTES NFR BLD: 3 K/UL (ref 1–4.8)
LYMPHOCYTES RELATIVE PERCENT: 32 % (ref 24–44)
MCH RBC QN AUTO: 30 PG (ref 26–34)
MCHC RBC AUTO-ENTMCNC: 33.2 G/DL (ref 31–37)
MCV RBC AUTO: 90.5 FL (ref 80–100)
MONOCYTES NFR BLD: 1 K/UL (ref 0.1–1.3)
MONOCYTES NFR BLD: 11 % (ref 1–7)
NEUTROPHILS NFR BLD: 53 % (ref 36–66)
NEUTS SEG NFR BLD: 4.8 K/UL (ref 1.3–9.1)
PLATELET # BLD AUTO: 191 K/UL (ref 150–450)
PMV BLD AUTO: 7 FL (ref 6–12)
POTASSIUM SERPL-SCNC: 3.9 MMOL/L (ref 3.7–5.3)
PROT SERPL-MCNC: 7.1 G/DL (ref 6.4–8.3)
PROTHROMBIN TIME: 16.1 SEC (ref 11.8–14.6)
RBC # BLD AUTO: 5.3 M/UL (ref 4.5–5.9)
SODIUM SERPL-SCNC: 143 MMOL/L (ref 135–144)
TROPONIN I SERPL HS-MCNC: 11 NG/L (ref 0–22)
WBC OTHER # BLD: 9.2 K/UL (ref 3.5–11)

## 2024-04-20 PROCEDURE — 80053 COMPREHEN METABOLIC PANEL: CPT

## 2024-04-20 PROCEDURE — 36415 COLL VENOUS BLD VENIPUNCTURE: CPT

## 2024-04-20 PROCEDURE — 2580000003 HC RX 258

## 2024-04-20 PROCEDURE — 85025 COMPLETE CBC W/AUTO DIFF WBC: CPT

## 2024-04-20 PROCEDURE — 84484 ASSAY OF TROPONIN QUANT: CPT

## 2024-04-20 PROCEDURE — 99223 1ST HOSP IP/OBS HIGH 75: CPT | Performed by: INTERNAL MEDICINE

## 2024-04-20 PROCEDURE — 85610 PROTHROMBIN TIME: CPT

## 2024-04-20 PROCEDURE — 6360000002 HC RX W HCPCS

## 2024-04-20 PROCEDURE — 4A03X5D MEASUREMENT OF ARTERIAL FLOW, INTRACRANIAL, EXTERNAL APPROACH: ICD-10-PCS | Performed by: INTERNAL MEDICINE

## 2024-04-20 PROCEDURE — 82947 ASSAY GLUCOSE BLOOD QUANT: CPT

## 2024-04-20 PROCEDURE — 99222 1ST HOSP IP/OBS MODERATE 55: CPT | Performed by: PSYCHIATRY & NEUROLOGY

## 2024-04-20 PROCEDURE — 99285 EMERGENCY DEPT VISIT HI MDM: CPT

## 2024-04-20 PROCEDURE — 6370000000 HC RX 637 (ALT 250 FOR IP)

## 2024-04-20 PROCEDURE — 6370000000 HC RX 637 (ALT 250 FOR IP): Performed by: STUDENT IN AN ORGANIZED HEALTH CARE EDUCATION/TRAINING PROGRAM

## 2024-04-20 PROCEDURE — 92523 SPEECH SOUND LANG COMPREHEN: CPT

## 2024-04-20 PROCEDURE — 2060000000 HC ICU INTERMEDIATE R&B

## 2024-04-20 PROCEDURE — 97161 PT EVAL LOW COMPLEX 20 MIN: CPT

## 2024-04-20 PROCEDURE — 93005 ELECTROCARDIOGRAM TRACING: CPT | Performed by: STUDENT IN AN ORGANIZED HEALTH CARE EDUCATION/TRAINING PROGRAM

## 2024-04-20 PROCEDURE — 94640 AIRWAY INHALATION TREATMENT: CPT

## 2024-04-20 PROCEDURE — 70450 CT HEAD/BRAIN W/O DYE: CPT

## 2024-04-20 PROCEDURE — 99447 NTRPROF PH1/NTRNET/EHR 11-20: CPT | Performed by: PSYCHIATRY & NEUROLOGY

## 2024-04-20 PROCEDURE — 94761 N-INVAS EAR/PLS OXIMETRY MLT: CPT

## 2024-04-20 PROCEDURE — 6370000000 HC RX 637 (ALT 250 FOR IP): Performed by: INTERNAL MEDICINE

## 2024-04-20 PROCEDURE — 92610 EVALUATE SWALLOWING FUNCTION: CPT

## 2024-04-20 RX ORDER — SODIUM CHLORIDE 9 MG/ML
INJECTION, SOLUTION INTRAVENOUS PRN
Status: DISCONTINUED | OUTPATIENT
Start: 2024-04-20 | End: 2024-04-21 | Stop reason: HOSPADM

## 2024-04-20 RX ORDER — TAMSULOSIN HYDROCHLORIDE 0.4 MG/1
0.4 CAPSULE ORAL 2 TIMES DAILY
Status: DISCONTINUED | OUTPATIENT
Start: 2024-04-20 | End: 2024-04-21 | Stop reason: HOSPADM

## 2024-04-20 RX ORDER — RANOLAZINE 500 MG/1
1000 TABLET, EXTENDED RELEASE ORAL 2 TIMES DAILY
Status: DISCONTINUED | OUTPATIENT
Start: 2024-04-20 | End: 2024-04-21 | Stop reason: HOSPADM

## 2024-04-20 RX ORDER — ONDANSETRON 2 MG/ML
4 INJECTION INTRAMUSCULAR; INTRAVENOUS EVERY 6 HOURS PRN
Status: DISCONTINUED | OUTPATIENT
Start: 2024-04-20 | End: 2024-04-21 | Stop reason: HOSPADM

## 2024-04-20 RX ORDER — EZETIMIBE 10 MG/1
10 TABLET ORAL NIGHTLY
Status: DISCONTINUED | OUTPATIENT
Start: 2024-04-20 | End: 2024-04-21 | Stop reason: HOSPADM

## 2024-04-20 RX ORDER — INSULIN LISPRO 100 [IU]/ML
0-4 INJECTION, SOLUTION INTRAVENOUS; SUBCUTANEOUS NIGHTLY
Status: DISCONTINUED | OUTPATIENT
Start: 2024-04-20 | End: 2024-04-21 | Stop reason: HOSPADM

## 2024-04-20 RX ORDER — SODIUM CHLORIDE 0.9 % (FLUSH) 0.9 %
10 SYRINGE (ML) INJECTION PRN
Status: DISCONTINUED | OUTPATIENT
Start: 2024-04-20 | End: 2024-04-21 | Stop reason: HOSPADM

## 2024-04-20 RX ORDER — METOPROLOL SUCCINATE 25 MG/1
25 TABLET, EXTENDED RELEASE ORAL NIGHTLY
Status: DISCONTINUED | OUTPATIENT
Start: 2024-04-20 | End: 2024-04-21 | Stop reason: HOSPADM

## 2024-04-20 RX ORDER — ALBUTEROL SULFATE 2.5 MG/3ML
2.5 SOLUTION RESPIRATORY (INHALATION) 4 TIMES DAILY
Status: DISCONTINUED | OUTPATIENT
Start: 2024-04-20 | End: 2024-04-21 | Stop reason: HOSPADM

## 2024-04-20 RX ORDER — ASPIRIN 81 MG/1
324 TABLET, CHEWABLE ORAL ONCE
Status: COMPLETED | OUTPATIENT
Start: 2024-04-20 | End: 2024-04-20

## 2024-04-20 RX ORDER — ALBUTEROL SULFATE 90 UG/1
2 AEROSOL, METERED RESPIRATORY (INHALATION) EVERY 6 HOURS PRN
Status: DISCONTINUED | OUTPATIENT
Start: 2024-04-20 | End: 2024-04-21 | Stop reason: HOSPADM

## 2024-04-20 RX ORDER — ASPIRIN 300 MG/1
300 SUPPOSITORY RECTAL DAILY
Status: DISCONTINUED | OUTPATIENT
Start: 2024-04-20 | End: 2024-04-21

## 2024-04-20 RX ORDER — ASPIRIN 81 MG/1
81 TABLET, CHEWABLE ORAL DAILY
Status: DISCONTINUED | OUTPATIENT
Start: 2024-04-20 | End: 2024-04-21

## 2024-04-20 RX ORDER — ONDANSETRON 4 MG/1
4 TABLET, ORALLY DISINTEGRATING ORAL EVERY 8 HOURS PRN
Status: DISCONTINUED | OUTPATIENT
Start: 2024-04-20 | End: 2024-04-21 | Stop reason: HOSPADM

## 2024-04-20 RX ORDER — PANTOPRAZOLE SODIUM 40 MG/1
40 TABLET, DELAYED RELEASE ORAL
Status: DISCONTINUED | OUTPATIENT
Start: 2024-04-20 | End: 2024-04-21 | Stop reason: HOSPADM

## 2024-04-20 RX ORDER — INSULIN LISPRO 100 [IU]/ML
0-4 INJECTION, SOLUTION INTRAVENOUS; SUBCUTANEOUS
Status: DISCONTINUED | OUTPATIENT
Start: 2024-04-20 | End: 2024-04-21 | Stop reason: HOSPADM

## 2024-04-20 RX ORDER — DEXTROSE MONOHYDRATE 100 MG/ML
INJECTION, SOLUTION INTRAVENOUS CONTINUOUS PRN
Status: DISCONTINUED | OUTPATIENT
Start: 2024-04-20 | End: 2024-04-21 | Stop reason: HOSPADM

## 2024-04-20 RX ORDER — CLOPIDOGREL BISULFATE 75 MG/1
75 TABLET ORAL DAILY
Status: DISCONTINUED | OUTPATIENT
Start: 2024-04-20 | End: 2024-04-21 | Stop reason: HOSPADM

## 2024-04-20 RX ORDER — SODIUM CHLORIDE 0.9 % (FLUSH) 0.9 %
10 SYRINGE (ML) INJECTION EVERY 12 HOURS SCHEDULED
Status: DISCONTINUED | OUTPATIENT
Start: 2024-04-20 | End: 2024-04-21 | Stop reason: HOSPADM

## 2024-04-20 RX ORDER — POLYETHYLENE GLYCOL 3350 17 G
2 POWDER IN PACKET (EA) ORAL
Status: DISCONTINUED | OUTPATIENT
Start: 2024-04-20 | End: 2024-04-21 | Stop reason: HOSPADM

## 2024-04-20 RX ORDER — CYCLOBENZAPRINE HCL 5 MG
5 TABLET ORAL 2 TIMES DAILY PRN
COMMUNITY

## 2024-04-20 RX ADMIN — APIXABAN 5 MG: 5 TABLET, FILM COATED ORAL at 08:28

## 2024-04-20 RX ADMIN — ALBUTEROL SULFATE 2.5 MG: 2.5 SOLUTION RESPIRATORY (INHALATION) at 15:24

## 2024-04-20 RX ADMIN — RANOLAZINE 1000 MG: 500 TABLET, EXTENDED RELEASE ORAL at 20:42

## 2024-04-20 RX ADMIN — SODIUM CHLORIDE, PRESERVATIVE FREE 10 ML: 5 INJECTION INTRAVENOUS at 08:28

## 2024-04-20 RX ADMIN — APIXABAN 5 MG: 5 TABLET, FILM COATED ORAL at 20:42

## 2024-04-20 RX ADMIN — TAMSULOSIN HYDROCHLORIDE 0.4 MG: 0.4 CAPSULE ORAL at 20:42

## 2024-04-20 RX ADMIN — EZETIMIBE 10 MG: 10 TABLET ORAL at 20:43

## 2024-04-20 RX ADMIN — SODIUM CHLORIDE, PRESERVATIVE FREE 10 ML: 5 INJECTION INTRAVENOUS at 20:42

## 2024-04-20 RX ADMIN — ISOSORBIDE MONONITRATE 90 MG: 60 TABLET, EXTENDED RELEASE ORAL at 08:27

## 2024-04-20 RX ADMIN — ALBUTEROL SULFATE 2.5 MG: 2.5 SOLUTION RESPIRATORY (INHALATION) at 19:27

## 2024-04-20 RX ADMIN — ASPIRIN 81 MG 81 MG: 81 TABLET ORAL at 08:27

## 2024-04-20 RX ADMIN — METOPROLOL SUCCINATE 25 MG: 25 TABLET, FILM COATED, EXTENDED RELEASE ORAL at 20:42

## 2024-04-20 RX ADMIN — ALBUTEROL SULFATE 2.5 MG: 2.5 SOLUTION RESPIRATORY (INHALATION) at 08:24

## 2024-04-20 RX ADMIN — PANTOPRAZOLE SODIUM 40 MG: 40 TABLET, DELAYED RELEASE ORAL at 05:08

## 2024-04-20 RX ADMIN — RANOLAZINE 1000 MG: 500 TABLET, EXTENDED RELEASE ORAL at 08:27

## 2024-04-20 RX ADMIN — ASPIRIN 81 MG 324 MG: 81 TABLET ORAL at 01:50

## 2024-04-20 RX ADMIN — TAMSULOSIN HYDROCHLORIDE 0.4 MG: 0.4 CAPSULE ORAL at 08:27

## 2024-04-20 RX ADMIN — ALBUTEROL SULFATE 2.5 MG: 2.5 SOLUTION RESPIRATORY (INHALATION) at 11:44

## 2024-04-20 ASSESSMENT — LIFESTYLE VARIABLES
HOW MANY STANDARD DRINKS CONTAINING ALCOHOL DO YOU HAVE ON A TYPICAL DAY: PATIENT DOES NOT DRINK
HOW OFTEN DO YOU HAVE A DRINK CONTAINING ALCOHOL: NEVER
HOW MANY STANDARD DRINKS CONTAINING ALCOHOL DO YOU HAVE ON A TYPICAL DAY: 1 OR 2
HOW OFTEN DO YOU HAVE A DRINK CONTAINING ALCOHOL: 2-4 TIMES A MONTH

## 2024-04-20 ASSESSMENT — PAIN - FUNCTIONAL ASSESSMENT: PAIN_FUNCTIONAL_ASSESSMENT: NONE - DENIES PAIN

## 2024-04-20 NOTE — PLAN OF CARE
Problem: Safety - Adult  Goal: Free from fall injury  Outcome: Progressing  Flowsheets (Taken 4/20/2024 3113)  Free From Fall Injury:   Instruct family/caregiver on patient safety   Based on caregiver fall risk screen, instruct family/caregiver to ask for assistance with transferring infant if caregiver noted to have fall risk factors

## 2024-04-20 NOTE — ED NOTES
Report given to RN from PCU.   Report method by phone   The following was reviewed with receiving RN:   Current vital signs:  /79   Pulse 69   Temp 97.7 °F (36.5 °C) (Oral)   Resp 19   Ht 1.778 m (5' 10\")   Wt 117.9 kg (260 lb)   SpO2 95%   BMI 37.31 kg/m²                MEWS Score: 1     Any medication or safety alerts were reviewed. Any pending diagnostics and notifications were also reviewed, as well as any safety concerns or issues, abnormal labs, abnormal imaging, and abnormal assessment findings. Questions were answered.

## 2024-04-20 NOTE — ED NOTES
Mode of arrival (squad #, walk in, police, etc) : EMS        Chief complaint(s): Numbness to right arm and right side of mouth/face        Arrival Note (brief scenario, treatment PTA, etc).: Pt arrived to ED after being woken up to feeling of numbness to right side of face/mouth and right arm. Pt states he went to bed around 11pm and woke up at midnight to numbness and drooling. Pt states that he is taking his blood thinners daily but is not taking his plavix.         C= \"Have you ever felt that you should Cut down on your drinking?\"  No  A= \"Have people Annoyed you by criticizing your drinking?\"  No  G= \"Have you ever felt bad or Guilty about your drinking?\"  No  E= \"Have you ever had a drink as an Eye-opener first thing in the morning to steady your nerves or to help a hangover?\"  No      Deferred []      Reason for deferring: N/A    *If yes to two or more: probable alcohol abuse.*

## 2024-04-20 NOTE — PLAN OF CARE
Problem: Discharge Planning  Goal: Discharge to home or other facility with appropriate resources  Outcome: Progressing     Problem: Safety - Adult  Goal: Free from fall injury  4/20/2024 1520 by Pacheco De Souza RN  Outcome: Progressing

## 2024-04-20 NOTE — CONSULTS
[facility]   IN-PATIENT SERVICE      NEUROLOGY CONSULT  NOTE            Date:   4/20/2024  Patient name:  Irwin Castro  Date of admission:  4/20/2024  YOB: 1950      Chief Complaint:     Chief Complaint   Patient presents with    Numbness     Right sided arm and mouth       Reason for Consult:      Numbness     History of Present Illness:     The patient is a 73 y.o. male who presents with Numbness (Right sided arm and mouth)  . The patient was seen and examined and the chart was reviewed.     This is a 73 year old male with history of PPM, atrial flutter on Eliquis, anxiety, COPD, DM who presented to the ED with acute onset numbness. According to patient, he went to bed at about 11 PM and woke up at midnight with right facial numbness and drool on his pillow. He stated that the feeling extended to his right arm. He also endorses near syncopal events in the past week as well. He states \"I feel like I am going to pass out.\" His CT head shows Mild chronic white matter microvascular ischemic changes.     Patient has significant history of CAD, right ICA stenosis, follows up with cardiology and vascular as outpatient, patient states that he recently stopped taking his Plavix because he was bleeding a lot, only takes Eliquis.     Past Medical History:     Past Medical History:   Diagnosis Date    Abnormal cardiovascular stress test     Anxiety disorder 08/31/2020    Atrial flutter (HCC) 05/17/2018    ELIQUIS    CAD (coronary artery disease)     Callus of foot     rt     COPD (chronic obstructive pulmonary disease) (HCC)     Former smoker     GERD (gastroesophageal reflux disease)     H/O cardiovascular stress test 09/2017    H/O non-A, non-B viral hepatitis     Hyperlipidemia     Hypertension     Marijuana use     MI (myocardial infarction) (HCC)     Obesity     Osteoarthritis     Pneumonia     Poor compliance     Rotator cuff tear     bilat    Shoulder pain, bilateral     Type II or 
Areas  +-----------+---------+---------+---------+----------+---------+-----------+  !Valve      !Peak     !Mean     !Area     !Index     !Flow     !Source     !  +-----------+---------+---------+---------+----------+---------+-----------+  !Aortic     !0        !0        !         !          !         !           !  +-----------+---------+---------+---------+----------+---------+-----------+  !Aortic     !0        !0        !         !          !         !           !  +-----------+---------+---------+---------+----------+---------+-----------+    Shunts     Oxygen Values      O2 [id number] [id number]          No results found for this or any previous visit.         Labs:     CBC:   Recent Labs     04/20/24 0115   WBC 9.2   HGB 15.9   HCT 48.0        BMP:   Recent Labs     04/20/24 0115      K 3.9   CO2 29   BUN 16   CREATININE 0.9   LABGLOM >90   GLUCOSE 141*     BNP: No results for input(s): \"BNP\" in the last 72 hours.  PT/INR:   Recent Labs     04/20/24 0115   PROTIME 16.1*   INR 1.3     APTT:No results for input(s): \"APTT\" in the last 72 hours.  CARDIAC ENZYMES:  Recent Labs     04/20/24  0115   TROPHS 11     FASTING LIPID PANEL:  Lab Results   Component Value Date/Time    HDL 36 12/16/2022 09:22 AM    TRIG 103 01/15/2021 08:50 AM     LIVER PROFILE:  Recent Labs     04/20/24 0115   AST 17   ALT 14       Seen by me on 8/23:  1. CAD- s/p recent PTCA/BERNADINE instent restenosis to RCA on 04/22-status post mid RCA InStent restenoses requiring PCI on 02/23  -has stable angina  -increase imdur 90 qd. On ranexa.   -continue current medications which include Eliquis, Plavix, Repatha, beta-blocker  -cannot stop blood thinners due to recent PCI and Afib.      2. VT. Noted on interrogation.  -now on lower dose beta-blocker due to fatigue and hypotension     3. Sick sinus syndrome. Status post dual chamber permanent pacemaker.  -continue routine pacer checks every 6 months      4. Paroxysmal atrial

## 2024-04-20 NOTE — ED PROVIDER NOTES
EMERGENCY DEPARTMENT ENCOUNTER    Pt Name: Irwin Castro  MRN: [id number]  Birthdate 1950  Date of evaluation: 4/20/24  CHIEF COMPLAINT       Chief Complaint   Patient presents with    Numbness     Right sided arm and mouth     HISTORY OF PRESENT ILLNESS   73-year-old with history of hypertension, hyperlipidemia, smoker, CAD, A-fib on Eliquis presenting with numbness    States he went to bed around 11 PM and was in his usual state of health    Woke up around midnight and felt like there was numbness on his right side of his face as well as his right arm    Had some drooling    Never any change in his speech    No headaches.  No falls or injuries.  No other weakness or numbness              REVIEW OF SYSTEMS     Review of Systems   Constitutional:  Negative for chills and fever.   HENT:  Negative for rhinorrhea and sore throat.    Eyes:  Negative for discharge and redness.   Respiratory:  Negative for chest tightness and shortness of breath.    Cardiovascular:  Negative for chest pain.   Gastrointestinal:  Negative for abdominal pain, diarrhea, nausea and vomiting.   Genitourinary:  Negative for dysuria and frequency.   Musculoskeletal:  Negative for arthralgias and myalgias.   Skin:  Negative for rash.   Neurological:  Positive for numbness. Negative for weakness.   Psychiatric/Behavioral:  Negative for suicidal ideas.    All other systems reviewed and are negative.    PASTMEDICAL HISTORY     Past Medical History:   Diagnosis Date    Abnormal cardiovascular stress test     Anxiety disorder 08/31/2020    Atrial flutter (HCC) 05/17/2018    ELIQUIS    CAD (coronary artery disease)     Callus of foot     rt     COPD (chronic obstructive pulmonary disease) (HCC)     Former smoker     GERD (gastroesophageal reflux disease)     H/O cardiovascular stress test 09/2017    H/O non-A, non-B viral hepatitis     Hyperlipidemia     Hypertension     Marijuana use     MI (myocardial infarction) (HCC)     Obesity

## 2024-04-20 NOTE — H&P
History:     Family History   Problem Relation Age of Onset    Cancer Mother     Cancer Father     Diabetes Other     Heart Disease Other     High Blood Pressure Other     Cancer Other     COPD Other     Arthritis Other        Review of Systems:     Positive and Negative as described in HPI.    CONSTITUTIONAL:  negative for fevers, chills, sweats, fatigue, weight loss  HEENT:  negative for vision, hearing changes, runny nose, throat pain  RESPIRATORY:  negative for shortness of breath, cough, congestion, wheezing.  CARDIOVASCULAR:  negative for chest pain, palpitations.  GASTROINTESTINAL:  negative for nausea, vomiting, diarrhea, constipation, change in bowel habits, abdominal pain   GENITOURINARY:  negative for difficulty of urination, burning with urination, frequency   INTEGUMENT:  negative for rash, skin lesions, easy bruising   HEMATOLOGIC/LYMPHATIC:  negative for swelling/edema   ALLERGIC/IMMUNOLOGIC:  negative for urticaria , itching  ENDOCRINE:  negative increase in drinking, increase in urination, hot or cold intolerance  MUSCULOSKELETAL:  negative joint pains, muscle aches, swelling of joints  NEUROLOGICAL:  negative for headaches, dizziness, lightheadedness, numbness, pain, tingling extremities  BEHAVIOR/PSYCH:  negative for depression, anxiety    Physical Exam:   /82   Pulse 68   Temp 97.6 °F (36.4 °C) (Oral)   Resp 18   Ht 1.778 m (5' 10\")   Wt 117.9 kg (260 lb)   SpO2 96%   BMI 37.31 kg/m²   Temp (24hrs), Av.5 °F (36.4 °C), Min:97.3 °F (36.3 °C), Max:97.7 °F (36.5 °C)    No results for input(s): \"POCGLU\" in the last 72 hours.  No intake or output data in the 24 hours ending 24 0841    General Appearance:  alert, well appearing, and in no acute distress  Mental status: oriented to person, place, and time with normal affect  Head:  normocephalic, atraumatic.  Eye: no icterus, redness, pupils equal and reactive, extraocular eye movements intact, conjunctiva clear  Ear: normal

## 2024-04-21 ENCOUNTER — APPOINTMENT (OUTPATIENT)
Dept: MRI IMAGING | Age: 74
End: 2024-04-21
Payer: MEDICARE

## 2024-04-21 VITALS
TEMPERATURE: 97.4 F | OXYGEN SATURATION: 95 % | RESPIRATION RATE: 18 BRPM | HEART RATE: 67 BPM | DIASTOLIC BLOOD PRESSURE: 81 MMHG | SYSTOLIC BLOOD PRESSURE: 130 MMHG | WEIGHT: 259.92 LBS | BODY MASS INDEX: 37.21 KG/M2 | HEIGHT: 70 IN

## 2024-04-21 LAB
CHOLEST SERPL-MCNC: 168 MG/DL
CHOLESTEROL/HDL RATIO: 4.5
ERYTHROCYTE [DISTWIDTH] IN BLOOD BY AUTOMATED COUNT: 14.8 % (ref 11.5–14.9)
EST. AVERAGE GLUCOSE BLD GHB EST-MCNC: 123 MG/DL
GLUCOSE BLD-MCNC: 101 MG/DL (ref 75–110)
GLUCOSE BLD-MCNC: 123 MG/DL (ref 75–110)
HBA1C MFR BLD: 5.9 % (ref 4–6)
HCT VFR BLD AUTO: 44.7 % (ref 41–53)
HDLC SERPL-MCNC: 37 MG/DL
HGB BLD-MCNC: 14.9 G/DL (ref 13.5–17.5)
LDLC SERPL CALC-MCNC: 89 MG/DL (ref 0–130)
MCH RBC QN AUTO: 30.1 PG (ref 26–34)
MCHC RBC AUTO-ENTMCNC: 33.3 G/DL (ref 31–37)
MCV RBC AUTO: 90.4 FL (ref 80–100)
PLATELET # BLD AUTO: 160 K/UL (ref 150–450)
PMV BLD AUTO: 6.7 FL (ref 6–12)
RBC # BLD AUTO: 4.95 M/UL (ref 4.5–5.9)
TRIGL SERPL-MCNC: 209 MG/DL
WBC OTHER # BLD: 7.6 K/UL (ref 3.5–11)

## 2024-04-21 PROCEDURE — 36415 COLL VENOUS BLD VENIPUNCTURE: CPT

## 2024-04-21 PROCEDURE — 6360000002 HC RX W HCPCS

## 2024-04-21 PROCEDURE — 2580000003 HC RX 258

## 2024-04-21 PROCEDURE — 83036 HEMOGLOBIN GLYCOSYLATED A1C: CPT

## 2024-04-21 PROCEDURE — 94640 AIRWAY INHALATION TREATMENT: CPT

## 2024-04-21 PROCEDURE — 70551 MRI BRAIN STEM W/O DYE: CPT

## 2024-04-21 PROCEDURE — 82947 ASSAY GLUCOSE BLOOD QUANT: CPT

## 2024-04-21 PROCEDURE — 6370000000 HC RX 637 (ALT 250 FOR IP)

## 2024-04-21 PROCEDURE — 94761 N-INVAS EAR/PLS OXIMETRY MLT: CPT

## 2024-04-21 PROCEDURE — 6370000000 HC RX 637 (ALT 250 FOR IP): Performed by: INTERNAL MEDICINE

## 2024-04-21 PROCEDURE — 99239 HOSP IP/OBS DSCHRG MGMT >30: CPT | Performed by: INTERNAL MEDICINE

## 2024-04-21 PROCEDURE — 85027 COMPLETE CBC AUTOMATED: CPT

## 2024-04-21 PROCEDURE — 80061 LIPID PANEL: CPT

## 2024-04-21 PROCEDURE — 6360000002 HC RX W HCPCS: Performed by: INTERNAL MEDICINE

## 2024-04-21 PROCEDURE — 99233 SBSQ HOSP IP/OBS HIGH 50: CPT | Performed by: INTERNAL MEDICINE

## 2024-04-21 PROCEDURE — 70544 MR ANGIOGRAPHY HEAD W/O DYE: CPT

## 2024-04-21 RX ORDER — LORAZEPAM 2 MG/ML
1 INJECTION INTRAMUSCULAR ONCE
Status: COMPLETED | OUTPATIENT
Start: 2024-04-21 | End: 2024-04-21

## 2024-04-21 RX ADMIN — LORAZEPAM 1 MG: 2 INJECTION INTRAMUSCULAR; INTRAVENOUS at 10:26

## 2024-04-21 RX ADMIN — TAMSULOSIN HYDROCHLORIDE 0.4 MG: 0.4 CAPSULE ORAL at 08:14

## 2024-04-21 RX ADMIN — PANTOPRAZOLE SODIUM 40 MG: 40 TABLET, DELAYED RELEASE ORAL at 06:13

## 2024-04-21 RX ADMIN — APIXABAN 5 MG: 5 TABLET, FILM COATED ORAL at 08:13

## 2024-04-21 RX ADMIN — RANOLAZINE 1000 MG: 500 TABLET, EXTENDED RELEASE ORAL at 08:14

## 2024-04-21 RX ADMIN — ASPIRIN 81 MG 81 MG: 81 TABLET ORAL at 08:13

## 2024-04-21 RX ADMIN — CLOPIDOGREL BISULFATE 75 MG: 75 TABLET ORAL at 08:13

## 2024-04-21 RX ADMIN — ALBUTEROL SULFATE 2.5 MG: 2.5 SOLUTION RESPIRATORY (INHALATION) at 07:34

## 2024-04-21 RX ADMIN — ISOSORBIDE MONONITRATE 90 MG: 60 TABLET, EXTENDED RELEASE ORAL at 08:13

## 2024-04-21 RX ADMIN — SODIUM CHLORIDE, PRESERVATIVE FREE 10 ML: 5 INJECTION INTRAVENOUS at 08:14

## 2024-04-21 NOTE — DISCHARGE SUMMARY
IN-PATIENT SERVICE   [facility] [place] [facility]    Discharge Summary     Patient ID: Irwin Castro  :  1950   MRN: [id number]     ACCOUNT:  [id number]   Patient's PCP: Waleska Castillo APRN - CNP  Admit Date: 2024   Discharge Date: 2024    Length of Stay: 1  Code Status:  Full Code  Admitting Physician: Karissa Fonseca MD  Discharge Physician: Karissa Fonseca MD     Active Discharge Diagnoses:     Primary Problem  Stroke-like symptoms      Hospital Problems  Active Hospital Problems    Diagnosis Date Noted    Numbness [R20.0] 2024     Priority: High    Cardiac pacemaker in situ [Z95.0] 2024     Priority: High    CAD S/P percutaneous coronary angioplasty [I25.10, Z98.61] 2024     Priority: High    Stroke-like symptoms [R29.90] 2024       Admission Condition:  fair     Discharged Condition: fair    Hospital Stay:     Hospital Course:  Irwin Castro is a 73 y.o. male who was admitted for the management of Stroke-like symptoms , presented to ER with Numbness (Right sided arm and mouth)    Irwin Castro is a 73 y.o. Non- / non  male with a history of coronary artery disease, type 2 diabetes mellitus, atrial flutter, pacemaker, stent placement, and near syncope who presents with Numbness (Right sided arm and mouth)   and is admitted to the hospital for the management of Stroke-like symptoms.     According to patient, he went to bed at about 11 PM and woke up at midnight with right facial numbness and drool on his pillow.  He stated that the feeling extended to his right arm.  He endorsed no dysarthria, ataxia, headache, or confusion at this time.  He does endorse a history of a tooth infection on the right side for which she had antibiotic treatment 1 month ago.  He also endorses near syncopal events in the past week as well.  He states \"I feel like I am going to pass out.\"  His CT head shows Mild chronic white matter microvascular ischemic

## 2024-04-21 NOTE — VIRTUAL HEALTH
Consults     Total time spent on this encounter:  10 minutes    --Tesfaye Hinojosa MD on 4/20/2024 at 8:15 PM    An electronic signature was used to authenticate this note.

## 2024-04-22 LAB
EKG ATRIAL RATE: 76 BPM
EKG Q-T INTERVAL: 420 MS
EKG QRS DURATION: 94 MS
EKG QTC CALCULATION (BAZETT): 478 MS
EKG R AXIS: 88 DEGREES
EKG T AXIS: 18 DEGREES
EKG VENTRICULAR RATE: 78 BPM

## 2024-04-22 PROCEDURE — 93010 ELECTROCARDIOGRAM REPORT: CPT | Performed by: INTERNAL MEDICINE

## 2024-04-25 NOTE — PROGRESS NOTES
Physician Progress Note      PATIENT:               NIYAH SHEEHAN  [facility] #:                  [id number]  :                       1950  ADMIT DATE:       2024 1:12 AM  DISCH DATE:        2024 4:43 PM  RESPONDING  PROVIDER #:        Karissa Fonseca MD          QUERY TEXT:    Patient admitted with stroke like symptoms.  MD progress notes states   that patient having TIA, patient's symptom has not resolved, started on   aspirin. Currently on aspirin and Eliquis, cannot take statin, will start   Zetia, check echocardiogram, check vascular carotid ultrasound, known history   last year was done 50 to 69% stenosis of right ICA. Vascular duplex carotid   bilateral order cancelled. If possible, please document in progress notes and   discharge summary if you are evaluating and /or treating any of the following:    The medical record reflects the following:  Risk Factors: stroke like symptoms  Clinical Indicators: vascular carotid ultrasound, known history last year was   done 50 to 69% stenosis of right ICA.  Treatment: neruo checks    Thank you.  Monica Ann RN, Clinical Documentation Integrity, [employer], [facility], CRCR  Options provided:  -- TIA due to stenosis of right ICA  -- TIA not due to stenosis of right ICA  -- Other - I will add my own diagnosis  -- Disagree - Not applicable / Not valid  -- Disagree - Clinically unable to determine / Unknown  -- Refer to Clinical Documentation Reviewer    PROVIDER RESPONSE TEXT:    This patient had TIA due to stenosis of right ICA.    Query created by: Monica Ann on 2024 2:21 PM      Electronically signed by:  Karissa Fonseca MD 2024 2:50 PM          
1100- Patient taken to [facility] MRI via EMS with RN. Patient given 1mg Ativan once per order during MRI scan. Patient tolerated procedure well. Vitals WDL.     1250- Patient returned to PCU 2117. Telemetry applied. Report given to primary RN.   
BRONCHOSPASM/BRONCHOCONSTRICTION     [x]         IMPROVE AERATION/BREATH SOUNDS  [x]   ADMINISTER BRONCHODILATOR THERAPY AS APPROPRIATE  [x]   ASSESS BREATH SOUNDS  [x]   IMPLEMENT AEROSOL/MDI PROTOCOL  [x]   PATIENT EDUCATION AS NEEDED    
Cardiology and neurology are both agreeable for discharge  
Dr. Burrows added to pt's list for consult.   
Dr. Navarrete (cardiology) added to pt's list for consult.  
Kyara Saenz DO (neuro) notified of consult.   
Physical Therapy  Facility/Department: [facility]  Physical Therapy Initial Assessment    Name: Irwin Castro  : 1950  MRN: [id number]  Date of Service: 2024    Discharge Recommendations:  Home independently   PT Equipment Recommendations  Equipment Needed: No      Patient Diagnosis(es): The primary encounter diagnosis was Numbness. Diagnoses of Stroke-like symptoms and SSS (sick sinus syndrome) ([facility]) were also pertinent to this visit.  Past Medical History:  has a past medical history of Abnormal cardiovascular stress test, Anxiety disorder, Atrial flutter ([facility]), CAD (coronary artery disease), Callus of foot, COPD (chronic obstructive pulmonary disease) ([facility]), Former smoker, GERD (gastroesophageal reflux disease), H/O cardiovascular stress test, H/O non-A, non-B viral hepatitis, Hyperlipidemia, Hypertension, Marijuana use, MI (myocardial infarction) ([facility]), Obesity, Osteoarthritis, Pneumonia, Poor compliance, Rotator cuff tear, Shoulder pain, bilateral, and Type II or unspecified type diabetes mellitus without mention of complication, not stated as uncontrolled.  Past Surgical History:  has a past surgical history that includes Coronary angioplasty with stent (2009); Cardiac catheterization (2018); Cardiac catheterization (); Pacemaker insertion (2019); Colonoscopy; Colonoscopy (10/30/2020); Colonoscopy (N/A, 10/30/2020); Coronary angioplasty with stent (2022); Cardiac catheterization (2023); and Coronary angioplasty with stent (2023).    Assessment   Assessment: Pt demonstrated independence with all of his functional needs.  No Skilled PT: Independent with functional mobility   Requires PT Follow-Up: No  Activity Tolerance  Activity Tolerance: Patient tolerated evaluation without incident     Plan   Physical Therapy Plan  General Plan: Discharge with evaluation only  Safety Devices  Type of Devices: Call light within reach, Left in bed, Bed alarm in 
RN called MRI at [facility] and gave pacer information to schedule MRI for tomorrow. They stated that they may be able to do it around 10 or 11 but that an ACLS RN would have to come with the patient. They will also fax the form for the Cardiologist to to sign.   
RN called and confirmed with the medtronic rep that the patient's device was conditional for 1.75 or 3T MRI and will need to be reprogrammed before and after the scan. RN verified that there is an RN who will be able to travel with the patient to [facility] to set up transport.   
RN called and spoke with [facility] and was notified that they cannot come here for the patient's MRI. Dr. Navarrete requested that we speak with Neurology and transfer to another facility if MRI is needed. RN notified Dr. Saenz who will eval the patient.   
SPEECH LANGUAGE PATHOLOGY  Facility/Department: [facility]   CLINICAL BEDSIDE SWALLOW EVALUATION    NAME: Irwin Castro  : 1950  MRN: [id number]    ADMISSION DATE: 2024  ADMITTING DIAGNOSIS: has Essential hypertension; Mixed hyperlipidemia; CAD (coronary artery disease); Other emphysema ([facility]); Primary osteoarthritis involving multiple joints; Class 1 obesity in adult; Rotator cuff tear; Shoulder pain, bilateral; Callus of foot; Type 2 diabetes mellitus without complication, without long-term current use of insulin ([facility]); Shoulder arthritis; RUQ pain; Atrial flutter ([facility]); Benign prostatic hyperplasia without lower urinary tract symptoms; Long term use of drug; S/P placement of cardiac pacemaker; Presence of stent in coronary artery in patient with coronary artery disease; Gastroesophageal reflux disease; Anxiety disorder; Tremor, unspecified; Community acquired pneumonia; Hypoxia; S/P angioplasty with stent; Chest pain; SOB (shortness of breath) on exertion; Near syncope; SSS (sick sinus syndrome) ([facility]); Stroke-like symptoms; Numbness; Cardiac pacemaker in situ; and CAD S/P percutaneous coronary angioplasty on their problem list.    Recent Chest Xray/CT of Chest: N/A    Date of Eval: 2024  Evaluating Therapist: CARLOTA Godfrey    Current Diet level:  Current Diet : Regular  Current Liquid Diet : Thin    Primary Complaint  Pt denies trouble swallowing.    Pain:  Pain Assessment  Pain Assessment: None - Denies Pain    Reason for Referral  Irwin Castro was referred for a bedside swallow evaluation to assess the efficiency of his swallow function, identify signs and symptoms of aspiration and make recommendations regarding safe dietary consistencies, effective compensatory strategies, and safe eating environment.    Impression  Dysphagia Diagnosis: Swallow function appears WFL  Dysphagia Impression : Pt presents with probable safe swallow for Regular diet with thin liquids as 
The patient confirmed that his pacemaker is a medtronic. RN confirmed with MRI tech that they can not do the MRI due to it being a medtronic. RN will notify primary physician.  
Transport is here to take [facility]  
Transport was set up with [facility] at 10:15-10:30am to take pt to [facility] MRI department.   
Assessments:  [place] Cognitive Assessment (MoCA) administered: Score 27/30 (norm score = 26+)    See separate clinical bedside swallowing evaluation report.           Prognosis:  Speech Therapy Prognosis  Prognosis: Excellent  Prognosis Considerations: Previous Level of Function;Participation Level  Individuals consulted  Consulted and agree with results and recommendations: Patient;RN  RN Name: Pacheco    Education:  Patient Education: Yes  Patient Education Response: Verbalizes understanding;Demonstrated understanding          Therapy Time:   Individual Concurrent Group Co-treatment   Time In 1436         Time Out 1454         Minutes 18                 Electronically signed by Mila Shin M.A., CCC-SLP on 4/20/2024 at 3:09 PM  
  CBC with Auto Differential    Collection Time: 04/20/24  1:15 AM   Result Value Ref Range    WBC 9.2 3.5 - 11.0 k/uL    RBC 5.30 4.5 - 5.9 m/uL    Hemoglobin 15.9 13.5 - 17.5 g/dL    Hematocrit 48.0 41 - 53 %    MCV 90.5 80 - 100 fL    MCH 30.0 26 - 34 pg    MCHC 33.2 31 - 37 g/dL    RDW 14.9 11.5 - 14.9 %    Platelets 191 150 - 450 k/uL    MPV 7.0 6.0 - 12.0 fL    Neutrophils % 53 36 - 66 %    Lymphocytes % 32 24 - 44 %    Monocytes % 11 (H) 1 - 7 %    Eosinophils % 3 0 - 4 %    Basophils % 1 0 - 2 %    Neutrophils Absolute 4.80 1.3 - 9.1 k/uL    Lymphocytes Absolute 3.00 1.0 - 4.8 k/uL    Monocytes Absolute 1.00 0.1 - 1.3 k/uL    Eosinophils Absolute 0.30 0.0 - 0.4 k/uL    Basophils Absolute 0.10 0.0 - 0.2 k/uL   Comprehensive Metabolic Panel    Collection Time: 04/20/24  1:15 AM   Result Value Ref Range    Sodium 143 135 - 144 mmol/L    Potassium 3.9 3.7 - 5.3 mmol/L    Chloride 101 98 - 107 mmol/L    CO2 29 20 - 31 mmol/L    Anion Gap 13 9 - 17 mmol/L    Glucose 141 (H) 70 - 99 mg/dL    BUN 16 8 - 23 mg/dL    Creatinine 0.9 0.7 - 1.2 mg/dL    Est, Glom Filt Rate >90 >60 mL/min/1.73m2    Calcium 9.7 8.6 - 10.4 mg/dL    Total Protein 7.1 6.4 - 8.3 g/dL    Albumin 4.2 3.5 - 5.2 g/dL    Total Bilirubin 0.4 0.3 - 1.2 mg/dL    Alkaline Phosphatase 75 40 - 129 U/L    ALT 14 5 - 41 U/L    AST 17 <40 U/L   Protime-INR    Collection Time: 04/20/24  1:15 AM   Result Value Ref Range    Protime 16.1 (H) 11.8 - 14.6 sec    INR 1.3    Troponin    Collection Time: 04/20/24  1:15 AM   Result Value Ref Range    Troponin, High Sensitivity 11 0 - 22 ng/L   EKG 12 Lead    Collection Time: 04/20/24  1:29 AM   Result Value Ref Range    Ventricular Rate 78 BPM    Atrial Rate 76 BPM    QRS Duration 94 ms    Q-T Interval 420 ms    QTc Calculation (Bazett) 478 ms    R Axis 88 degrees    T Axis 18 degrees       Imaging/Diagnostics:  CT HEAD WO CONTRAST    Addendum Date: 4/20/2024    ADDENDUM: Findings were conveyed to ELVI MARTÍNEZ at 
arteries.    ANEURYSM: No intracranial aneurysm is seen.  Impression: 1. No acute intracranial abnormality. No acute infarct.  2. No intracranial arterial stenosis or occlusion.  MRI brain without contrast  Narrative: EXAMINATION:  MRI OF THE BRAIN WITHOUT CONTRAST AND MRA HEAD WITHOUT CONTRAST    4/21/2024 11:56 am    TECHNIQUE:  Multiplanar multisequence MRI of the brain was performed without the  administration of intravenous contrast.    MRA of the head was performed utilizing time-of-flight imaging with MIP  images. No intravenous contrast was administered.    COMPARISON:  None.    HISTORY:  ORDERING SYSTEM PROVIDED HISTORY: CVA  TECHNOLOGIST PROVIDED HISTORY:  CVA  Reason for Exam: cva; ORDERING SYSTEM PROVIDED HISTORY: stroke  TECHNOLOGIST PROVIDED HISTORY:  stroke  What is the sedation requirement?->None  Decision Support Exception - unselect if not a suspected or confirmed  emergency medical condition->Emergency Medical Condition (MA)  Reason for Exam: cva    FINDINGS:  MRI BRAIN:    INTRACRANIAL STRUCTURES/VENTRICLES: There is no acute infarct. No mass effect  or midline shift. No evidence of an acute intracranial hemorrhage.  The  ventricles and sulci are normal in size and configuration.  The  sellar/suprasellar regions appear unremarkable.  The normal signal voids  within the major intracranial vessels appear maintained.    Mild chronic microvascular disease is appreciated within the periventricular  white matter.    ORBITS: The visualized portion of the orbits demonstrate no acute abnormality.    SINUSES: The visualized paranasal sinuses and mastoid air cells are well  aerated.    BONES/SOFT TISSUES: The bone marrow signal intensity appears normal. The soft  tissues demonstrate no acute abnormality.    MRA HEAD:    Patient motion artifact is identified.    ANTERIOR CIRCULATION: No significant stenosis of the intracranial internal  carotid, anterior cerebral, or middle cerebral arteries.    POSTERIOR 
Date of Study       02/16/2023   Name      Date of   1950            Gender              Male   Birth      Age       72 year(s)            Race                      Room      [id number]  Height:             70 inch, 177.8 cm   Number      Corporate [id number]              Weight:             228 pounds, 103.4 kg   ID #      Patient   [id number]             BSA:      2.21 m^2  BMI:      32.71 kg/m^2   Acct #      MR #      [id number]               Performing          Opal Alexander                                   Physician      Accession [id number]            Referring   #                               Physician      Case ID # [id number]                 Assisting                                   Physician     Additional Comments  ASA & Mallampati documented in [facility] by Physician.  H&P reviewed and patient examined by performing physician prior to  Patient medications reviewed by Physician prior to procedure.  ASA Classification / Mallampati : per Physician.    Procedure  Procedure Type:     Diagnostic procedure: Lt Heart, Coronary Angio, LVgram, Ultrasound NOT   used for access - Radial      PCI procedure: PTCA / Drug Eluting Stent:, RCA and / or branches     Complications:    - No complication    Indications:    - Angina - Unstable      - CAD      - Previous stent placement     Conclusions      Procedure Summary      In-stent re-stenosis mid RCA underwent BERNADINE.   Non-obstructive LAD and LCX disease unchanged from 2018   Normal LV systolic function.      Recommendations      Routine Post Stent Orders.   Medical therapy as needed.   Risk factor modification.      Signature      ----------------------------------------------------------------   Electronically signed by Opal Alexander(Performing Physician) on   02/16/2023 09:44   ----------------------------------------------------------------      Angiographic Findings      Cardiac Arteries and Lesion Findings     LMCA: Normal 0% stenosis.    LAD: Diffuse

## 2024-05-04 NOTE — PROGRESS NOTES
[facility] Preoperative Instructions:    Stop eating solid foods as per your bowel prep instructions. Stop drinking clear liquids at midnight the night prior to your surgery. Arrive at the surgery center (3rd entrance) on ____03-85-2014___________ by ____0800_ wearing a mask.__________. Please stop any blood thinning medications as directed by your surgeon or prescribing physician. Failure to stop certain medications may interfere with your scheduled surgery. These may include: Aspirin, Coumadin, Plavix, NSAIDS (Motrin, Aleve, Advil, Mobic, Celebrex), Eliquis, Pradaxa, Xarelto, Fish oil, and herbal supplements. HOLD ELIQUIS for 2 days prior as per cardiology. You may continue the rest of your medications through the night before surgery unless instructed otherwise. Day of surgery please take only the following medication(s) with a small sip of water: Prilosec, Reglan, Ranexa, Lisnopril      Please use and bring inhalers the day of surgery. Use  All scheduled inhalers and bring emergency inhaler with you. Reminders:  -If you are going home the day of your procedure, you will need a family member or friend to stay during the procedure and drive you home after your procedure. Your  must be 25years of age or older and able to sign off on your discharge instructions.    -If you are going home the same day of your surgery, someone must remain with you for the first 24 hours after your surgery if you receive sedation or anesthesia.      -Please do not wear any jewelery or body piercing the day of surgery

## 2024-07-23 NOTE — PROGRESS NOTES
Physician Progress Note      PATIENT:               Judith Nicholas  CSN #:                  [id number]  :                       1950  ADMIT DATE:       2023 11:37 AM  DISCH DATE:        2023 1:53 PM  RESPONDING  PROVIDER #:        Marta Coppola MD          QUERY TEXT:    Patient was admitted with Dyspnea on exertion. Noted A flutter and is   maintained on Eliquis. If possible, please document in progress notes and   discharge summary if you are evaluating and/or treating any of the following: The medical record reflects the following:  Risk Factors: DM, Age, A. Flutter, CAD, HTN  Clinical Indicators: Per H&P \"A flutter on Eliquis\"  Treatment: Eliquis  Options provided:  -- Secondary hypercoagulable state in a patient with atrial fibrillation  -- Other - I will add my own diagnosis  -- Disagree - Not applicable / Not valid  -- Disagree - Clinically unable to determine / Unknown  -- Refer to Clinical Documentation Reviewer    PROVIDER RESPONSE TEXT:    This patient has secondary hypercoagulable state in a patient with atrial   fibrillation.     Query created by: Steven Nolan on 2023 12:39 PM      Electronically signed by:  Marta Coppola MD 2023 4:04 PM Skin normal color for race, warm, dry and intact. No evidence of rash.

## 2024-09-06 ENCOUNTER — HOSPITAL ENCOUNTER (OUTPATIENT)
Dept: CT IMAGING | Age: 74
Discharge: HOME | End: 2024-09-08
Attending: INTERNAL MEDICINE
Payer: MEDICARE

## 2024-09-06 ENCOUNTER — HOSPITAL ENCOUNTER (OUTPATIENT)
Dept: CT IMAGING | Age: 74
Discharge: HOME | End: 2024-09-08
Payer: MEDICARE

## 2024-09-06 VITALS — HEIGHT: 70 IN | BODY MASS INDEX: 30.06 KG/M2 | WEIGHT: 210 LBS

## 2024-09-06 DIAGNOSIS — R10.9 FLANK PAIN: ICD-10-CM

## 2024-09-06 DIAGNOSIS — Z12.2 ENCOUNTER FOR SPECIAL SCREENING EXAMINATION FOR NEOPLASM OF RESPIRATORY ORGAN: ICD-10-CM

## 2024-09-06 DIAGNOSIS — F17.210 CIGARETTE SMOKER: ICD-10-CM

## 2024-09-06 PROCEDURE — 74176 CT ABD & PELVIS W/O CONTRAST: CPT

## 2024-09-06 PROCEDURE — 71271 CT THORAX LUNG CANCER SCR C-: CPT

## 2024-10-11 ENCOUNTER — HOSPITAL ENCOUNTER (OUTPATIENT)
Age: 74
Setting detail: SPECIMEN
Discharge: HOME | End: 2024-10-11

## 2024-10-11 LAB — PSA SERPL-MCNC: 5.5 NG/ML (ref 0–4)

## 2025-01-19 ENCOUNTER — APPOINTMENT (OUTPATIENT)
Dept: GENERAL RADIOLOGY | Age: 75
End: 2025-01-19
Payer: MEDICARE

## 2025-01-19 ENCOUNTER — HOSPITAL ENCOUNTER (OUTPATIENT)
Age: 75
Setting detail: OBSERVATION
Discharge: HOME | End: 2025-01-21
Attending: EMERGENCY MEDICINE | Admitting: INTERNAL MEDICINE
Payer: MEDICARE

## 2025-01-19 DIAGNOSIS — I48.0 PAROXYSMAL ATRIAL FIBRILLATION (HCC): ICD-10-CM

## 2025-01-19 DIAGNOSIS — R07.9 CHEST PAIN, UNSPECIFIED TYPE: Primary | ICD-10-CM

## 2025-01-19 DIAGNOSIS — I20.0 UNSTABLE ANGINA PECTORIS (HCC): ICD-10-CM

## 2025-01-19 PROBLEM — I20.9 ANGINA PECTORIS WITHOUT MYOCARDIAL INFARCTION (HCC): Status: ACTIVE | Noted: 2025-01-19

## 2025-01-19 LAB
ANION GAP SERPL CALCULATED.3IONS-SCNC: 12 MMOL/L (ref 9–16)
BASOPHILS # BLD: 0 K/UL (ref 0–0.2)
BASOPHILS NFR BLD: 0 % (ref 0–2)
BUN SERPL-MCNC: 11 MG/DL (ref 8–23)
CALCIUM SERPL-MCNC: 8.4 MG/DL (ref 8.6–10.4)
CHLORIDE SERPL-SCNC: 103 MMOL/L (ref 98–107)
CO2 SERPL-SCNC: 26 MMOL/L (ref 20–31)
CREAT SERPL-MCNC: 0.7 MG/DL (ref 0.7–1.2)
EOSINOPHIL # BLD: 0 K/UL (ref 0–0.4)
EOSINOPHILS RELATIVE PERCENT: 1 % (ref 0–4)
ERYTHROCYTE [DISTWIDTH] IN BLOOD BY AUTOMATED COUNT: 14.7 % (ref 11.5–14.9)
GFR, ESTIMATED: >90 ML/MIN/1.73M2
GLUCOSE SERPL-MCNC: 214 MG/DL (ref 74–99)
HCT VFR BLD AUTO: 47.2 % (ref 41–53)
HGB BLD-MCNC: 16.1 G/DL (ref 13.5–17.5)
LYMPHOCYTES NFR BLD: 2 K/UL (ref 1–4.8)
LYMPHOCYTES RELATIVE PERCENT: 21 % (ref 24–44)
MCH RBC QN AUTO: 32.1 PG (ref 26–34)
MCHC RBC AUTO-ENTMCNC: 34 G/DL (ref 31–37)
MCV RBC AUTO: 94.3 FL (ref 80–100)
MONOCYTES NFR BLD: 0.8 K/UL (ref 0.1–1.3)
MONOCYTES NFR BLD: 8 % (ref 1–7)
NEUTROPHILS NFR BLD: 70 % (ref 36–66)
NEUTS SEG NFR BLD: 6.6 K/UL (ref 1.3–9.1)
PLATELET # BLD AUTO: 144 K/UL (ref 150–450)
PMV BLD AUTO: 7.4 FL (ref 6–12)
POTASSIUM SERPL-SCNC: 3.5 MMOL/L (ref 3.7–5.3)
RBC # BLD AUTO: 5.01 M/UL (ref 4.5–5.9)
SODIUM SERPL-SCNC: 141 MMOL/L (ref 136–145)
TROPONIN I SERPL HS-MCNC: 15 NG/L (ref 0–22)
TROPONIN I SERPL HS-MCNC: 19 NG/L (ref 0–22)
WBC OTHER # BLD: 9.4 K/UL (ref 3.5–11)

## 2025-01-19 PROCEDURE — G0378 HOSPITAL OBSERVATION PER HR: HCPCS

## 2025-01-19 PROCEDURE — 93005 ELECTROCARDIOGRAM TRACING: CPT

## 2025-01-19 PROCEDURE — 99285 EMERGENCY DEPT VISIT HI MDM: CPT

## 2025-01-19 PROCEDURE — 6370000000 HC RX 637 (ALT 250 FOR IP): Performed by: NURSE PRACTITIONER

## 2025-01-19 PROCEDURE — 80048 BASIC METABOLIC PNL TOTAL CA: CPT

## 2025-01-19 PROCEDURE — 6360000002 HC RX W HCPCS: Performed by: NURSE PRACTITIONER

## 2025-01-19 PROCEDURE — 71045 X-RAY EXAM CHEST 1 VIEW: CPT

## 2025-01-19 PROCEDURE — 85025 COMPLETE CBC W/AUTO DIFF WBC: CPT

## 2025-01-19 PROCEDURE — 36415 COLL VENOUS BLD VENIPUNCTURE: CPT

## 2025-01-19 PROCEDURE — 84484 ASSAY OF TROPONIN QUANT: CPT

## 2025-01-19 PROCEDURE — 94640 AIRWAY INHALATION TREATMENT: CPT

## 2025-01-19 RX ORDER — ASPIRIN 81 MG/1
81 TABLET ORAL DAILY
Status: DISCONTINUED | OUTPATIENT
Start: 2025-01-20 | End: 2025-01-21 | Stop reason: HOSPADM

## 2025-01-19 RX ORDER — ALBUTEROL SULFATE 0.83 MG/ML
2.5 SOLUTION RESPIRATORY (INHALATION) 4 TIMES DAILY PRN
Status: DISCONTINUED | OUTPATIENT
Start: 2025-01-19 | End: 2025-01-21 | Stop reason: HOSPADM

## 2025-01-19 RX ORDER — NITROGLYCERIN 0.4 MG/1
0.4 TABLET SUBLINGUAL EVERY 5 MIN PRN
Status: DISCONTINUED | OUTPATIENT
Start: 2025-01-19 | End: 2025-01-21 | Stop reason: HOSPADM

## 2025-01-19 RX ORDER — SODIUM CHLORIDE 0.9 % (FLUSH) 0.9 %
5-40 SYRINGE (ML) INJECTION EVERY 12 HOURS SCHEDULED
Status: DISCONTINUED | OUTPATIENT
Start: 2025-01-19 | End: 2025-01-21 | Stop reason: HOSPADM

## 2025-01-19 RX ORDER — ONDANSETRON 4 MG/1
4 TABLET, ORALLY DISINTEGRATING ORAL EVERY 8 HOURS PRN
Status: DISCONTINUED | OUTPATIENT
Start: 2025-01-19 | End: 2025-01-21 | Stop reason: HOSPADM

## 2025-01-19 RX ORDER — RANOLAZINE 500 MG/1
1000 TABLET, EXTENDED RELEASE ORAL 2 TIMES DAILY
Status: DISCONTINUED | OUTPATIENT
Start: 2025-01-19 | End: 2025-01-19

## 2025-01-19 RX ORDER — BISACODYL 10 MG
10 SUPPOSITORY, RECTAL RECTAL DAILY PRN
Status: DISCONTINUED | OUTPATIENT
Start: 2025-01-19 | End: 2025-01-21 | Stop reason: HOSPADM

## 2025-01-19 RX ORDER — METOPROLOL SUCCINATE 25 MG/1
25 TABLET, EXTENDED RELEASE ORAL NIGHTLY
Status: DISCONTINUED | OUTPATIENT
Start: 2025-01-19 | End: 2025-01-21 | Stop reason: HOSPADM

## 2025-01-19 RX ORDER — SODIUM CHLORIDE 9 MG/ML
INJECTION, SOLUTION INTRAVENOUS PRN
Status: DISCONTINUED | OUTPATIENT
Start: 2025-01-19 | End: 2025-01-21 | Stop reason: HOSPADM

## 2025-01-19 RX ORDER — SODIUM CHLORIDE 0.9 % (FLUSH) 0.9 %
10 SYRINGE (ML) INJECTION PRN
Status: DISCONTINUED | OUTPATIENT
Start: 2025-01-19 | End: 2025-01-21 | Stop reason: HOSPADM

## 2025-01-19 RX ORDER — MAGNESIUM SULFATE HEPTAHYDRATE 40 MG/ML
2000 INJECTION, SOLUTION INTRAVENOUS PRN
Status: DISCONTINUED | OUTPATIENT
Start: 2025-01-19 | End: 2025-01-21 | Stop reason: HOSPADM

## 2025-01-19 RX ORDER — ACETAMINOPHEN 650 MG/1
650 SUPPOSITORY RECTAL EVERY 6 HOURS PRN
Status: DISCONTINUED | OUTPATIENT
Start: 2025-01-19 | End: 2025-01-21 | Stop reason: HOSPADM

## 2025-01-19 RX ORDER — TAMSULOSIN HYDROCHLORIDE 0.4 MG/1
0.4 CAPSULE ORAL 2 TIMES DAILY
Status: DISCONTINUED | OUTPATIENT
Start: 2025-01-19 | End: 2025-01-21 | Stop reason: HOSPADM

## 2025-01-19 RX ORDER — ONDANSETRON 2 MG/ML
4 INJECTION INTRAMUSCULAR; INTRAVENOUS EVERY 6 HOURS PRN
Status: DISCONTINUED | OUTPATIENT
Start: 2025-01-19 | End: 2025-01-21 | Stop reason: HOSPADM

## 2025-01-19 RX ORDER — ISOSORBIDE MONONITRATE 30 MG/1
90 TABLET, EXTENDED RELEASE ORAL DAILY
Status: DISCONTINUED | OUTPATIENT
Start: 2025-01-20 | End: 2025-01-21 | Stop reason: HOSPADM

## 2025-01-19 RX ORDER — RANOLAZINE 1000 MG/1
1000 TABLET, EXTENDED RELEASE ORAL 2 TIMES DAILY
Status: DISCONTINUED | OUTPATIENT
Start: 2025-01-19 | End: 2025-01-21 | Stop reason: HOSPADM

## 2025-01-19 RX ORDER — POTASSIUM CHLORIDE 7.45 MG/ML
10 INJECTION INTRAVENOUS PRN
Status: DISCONTINUED | OUTPATIENT
Start: 2025-01-19 | End: 2025-01-21 | Stop reason: HOSPADM

## 2025-01-19 RX ORDER — ALBUTEROL SULFATE 90 UG/1
2 INHALANT RESPIRATORY (INHALATION) EVERY 6 HOURS PRN
Status: DISCONTINUED | OUTPATIENT
Start: 2025-01-19 | End: 2025-01-21 | Stop reason: HOSPADM

## 2025-01-19 RX ORDER — POLYETHYLENE GLYCOL 3350 17 G/17G
17 POWDER, FOR SOLUTION ORAL DAILY PRN
Status: DISCONTINUED | OUTPATIENT
Start: 2025-01-19 | End: 2025-01-21 | Stop reason: HOSPADM

## 2025-01-19 RX ORDER — ACETAMINOPHEN 325 MG/1
650 TABLET ORAL EVERY 6 HOURS PRN
Status: DISCONTINUED | OUTPATIENT
Start: 2025-01-19 | End: 2025-01-21 | Stop reason: HOSPADM

## 2025-01-19 RX ORDER — DIPHENHYDRAMINE HCL 25 MG
25 TABLET ORAL EVERY 6 HOURS PRN
COMMUNITY

## 2025-01-19 RX ORDER — POTASSIUM CHLORIDE 1500 MG/1
40 TABLET, EXTENDED RELEASE ORAL PRN
Status: DISCONTINUED | OUTPATIENT
Start: 2025-01-19 | End: 2025-01-21 | Stop reason: HOSPADM

## 2025-01-19 RX ORDER — ASPIRIN 81 MG/1
324 TABLET, CHEWABLE ORAL ONCE
Status: DISCONTINUED | OUTPATIENT
Start: 2025-01-19 | End: 2025-01-19

## 2025-01-19 RX ADMIN — ALBUTEROL SULFATE 2.5 MG: 2.5 SOLUTION RESPIRATORY (INHALATION) at 23:49

## 2025-01-19 RX ADMIN — METOPROLOL SUCCINATE 25 MG: 25 TABLET, EXTENDED RELEASE ORAL at 23:44

## 2025-01-19 RX ADMIN — TAMSULOSIN HYDROCHLORIDE 0.4 MG: 0.4 CAPSULE ORAL at 23:44

## 2025-01-19 RX ADMIN — POTASSIUM CHLORIDE 40 MEQ: 1500 TABLET, EXTENDED RELEASE ORAL at 23:45

## 2025-01-19 RX ADMIN — RANOLAZINE 1000 MG: 1000 TABLET, EXTENDED RELEASE ORAL at 23:44

## 2025-01-19 ASSESSMENT — HEART SCORE: ECG: NON-SPECIFC REPOLARIZATION DISTURBANCE/LBTB/PM

## 2025-01-19 ASSESSMENT — PAIN SCALES - GENERAL: PAINLEVEL_OUTOF10: 0

## 2025-01-20 ENCOUNTER — APPOINTMENT (OUTPATIENT)
Age: 75
End: 2025-01-20
Payer: MEDICARE

## 2025-01-20 LAB
ANION GAP SERPL CALCULATED.3IONS-SCNC: 8 MMOL/L (ref 9–16)
B PARAP IS1001 DNA NPH QL NAA+NON-PROBE: NOT DETECTED
B PERT DNA SPEC QL NAA+PROBE: NOT DETECTED
BASOPHILS # BLD: 0 K/UL (ref 0–0.2)
BASOPHILS NFR BLD: 1 % (ref 0–2)
BUN SERPL-MCNC: 11 MG/DL (ref 8–23)
C PNEUM DNA NPH QL NAA+NON-PROBE: NOT DETECTED
CA-I BLD-SCNC: 1.15 MMOL/L (ref 1.1–1.33)
CALCIUM SERPL-MCNC: 8.4 MG/DL (ref 8.6–10.4)
CHLORIDE SERPL-SCNC: 106 MMOL/L (ref 98–107)
CO2 SERPL-SCNC: 26 MMOL/L (ref 20–31)
CREAT SERPL-MCNC: 0.8 MG/DL (ref 0.7–1.2)
D DIMER PPP FEU-MCNC: 0.44 UG/ML FEU (ref 0–0.59)
ECHO AO ROOT DIAM: 4 CM
ECHO AO ROOT INDEX: 1.87 CM/M2
ECHO AR MAX VEL PISA: 3.5 M/S
ECHO AV REGURGITANT PHT: 735 MS
ECHO BSA: 2.18 M2
ECHO LA AREA 2C: 19.4 CM2
ECHO LA AREA 4C: 17.2 CM2
ECHO LA DIAMETER INDEX: 1.82 CM/M2
ECHO LA DIAMETER: 3.9 CM
ECHO LA MAJOR AXIS: 5.5 CM
ECHO LA MINOR AXIS: 6 CM
ECHO LA TO AORTIC ROOT RATIO: 0.98
ECHO LA VOL BP: 49 ML (ref 18–58)
ECHO LA VOL MOD A2C: 52 ML (ref 18–58)
ECHO LA VOL MOD A4C: 43 ML (ref 18–58)
ECHO LA VOL/BSA BIPLANE: 23 ML/M2 (ref 16–34)
ECHO LA VOLUME INDEX MOD A2C: 24 ML/M2 (ref 16–34)
ECHO LA VOLUME INDEX MOD A4C: 20 ML/M2 (ref 16–34)
ECHO LV E' LATERAL VELOCITY: 6.85 CM/S
ECHO LV E' SEPTAL VELOCITY: 4.79 CM/S
ECHO LV EDV A2C: 105 ML
ECHO LV EDV A4C: 100 ML
ECHO LV EDV INDEX A4C: 47 ML/M2
ECHO LV EDV NDEX A2C: 49 ML/M2
ECHO LV EF PHYSICIAN: 45 %
ECHO LV EJECTION FRACTION A2C: 46 %
ECHO LV EJECTION FRACTION A4C: 41 %
ECHO LV EJECTION FRACTION BIPLANE: 45 % (ref 55–100)
ECHO LV ESV A2C: 57 ML
ECHO LV ESV A4C: 59 ML
ECHO LV ESV INDEX A2C: 27 ML/M2
ECHO LV ESV INDEX A4C: 28 ML/M2
ECHO LV FRACTIONAL SHORTENING: 26 % (ref 28–44)
ECHO LV GLOBAL LONGITUDINAL STRAIN (GLS): -12 %
ECHO LV INTERNAL DIMENSION DIASTOLE INDEX: 2.01 CM/M2
ECHO LV INTERNAL DIMENSION DIASTOLIC: 4.3 CM (ref 4.2–5.9)
ECHO LV INTERNAL DIMENSION SYSTOLIC INDEX: 1.5 CM/M2
ECHO LV INTERNAL DIMENSION SYSTOLIC: 3.2 CM
ECHO LV IVSD: 1.4 CM (ref 0.6–1)
ECHO LV MASS 2D: 232.2 G (ref 88–224)
ECHO LV MASS INDEX 2D: 108.5 G/M2 (ref 49–115)
ECHO LV POSTERIOR WALL DIASTOLIC: 1.4 CM (ref 0.6–1)
ECHO LV RELATIVE WALL THICKNESS RATIO: 0.65
ECHO LVOT AREA: 3.5 CM2
ECHO LVOT DIAM: 2.1 CM
ECHO LVOT MEAN GRADIENT: 3 MMHG
ECHO LVOT PEAK GRADIENT: 4 MMHG
ECHO LVOT PEAK VELOCITY: 1 M/S
ECHO LVOT STROKE VOLUME INDEX: 34.3 ML/M2
ECHO LVOT SV: 73.4 ML
ECHO LVOT VTI: 21.2 CM
ECHO MV A VELOCITY: 0.87 M/S
ECHO MV E DECELERATION TIME (DT): 183 MS
ECHO MV E VELOCITY: 0.59 M/S
ECHO MV E/A RATIO: 0.68
ECHO MV E/E' LATERAL: 8.61
ECHO MV E/E' RATIO (AVERAGED): 10.47
ECHO MV E/E' SEPTAL: 12.32
ECHO RA AREA 4C: 14.5 CM2
ECHO RA END SYSTOLIC VOLUME APICAL 4 CHAMBER INDEX BSA: 15 ML/M2
ECHO RA VOLUME: 33 ML
ECHO RV TAPSE: 1.2 CM (ref 1.7–?)
EKG ATRIAL RATE: 73 BPM
EKG P AXIS: 30 DEGREES
EKG Q-T INTERVAL: 444 MS
EKG QRS DURATION: 132 MS
EKG QTC CALCULATION (BAZETT): 489 MS
EKG R AXIS: -73 DEGREES
EKG T AXIS: 98 DEGREES
EKG VENTRICULAR RATE: 73 BPM
EOSINOPHIL # BLD: 0.1 K/UL (ref 0–0.4)
EOSINOPHILS RELATIVE PERCENT: 1 % (ref 0–4)
ERYTHROCYTE [DISTWIDTH] IN BLOOD BY AUTOMATED COUNT: 14.7 % (ref 11.5–14.9)
FLUAV RNA NPH QL NAA+NON-PROBE: NOT DETECTED
FLUBV RNA NPH QL NAA+NON-PROBE: NOT DETECTED
GFR, ESTIMATED: >90 ML/MIN/1.73M2
GLUCOSE SERPL-MCNC: 136 MG/DL (ref 74–99)
HADV DNA NPH QL NAA+NON-PROBE: NOT DETECTED
HCOV 229E RNA NPH QL NAA+NON-PROBE: NOT DETECTED
HCOV HKU1 RNA NPH QL NAA+NON-PROBE: NOT DETECTED
HCOV NL63 RNA NPH QL NAA+NON-PROBE: NOT DETECTED
HCOV OC43 RNA NPH QL NAA+NON-PROBE: NOT DETECTED
HCT VFR BLD AUTO: 44.3 % (ref 41–53)
HGB BLD-MCNC: 15.6 G/DL (ref 13.5–17.5)
HMPV RNA NPH QL NAA+NON-PROBE: NOT DETECTED
HPIV1 RNA NPH QL NAA+NON-PROBE: NOT DETECTED
HPIV2 RNA NPH QL NAA+NON-PROBE: NOT DETECTED
HPIV3 RNA NPH QL NAA+NON-PROBE: NOT DETECTED
HPIV4 RNA NPH QL NAA+NON-PROBE: NOT DETECTED
LYMPHOCYTES NFR BLD: 2.4 K/UL (ref 1–4.8)
LYMPHOCYTES RELATIVE PERCENT: 25 % (ref 24–44)
M PNEUMO DNA NPH QL NAA+NON-PROBE: NOT DETECTED
MCH RBC QN AUTO: 32.8 PG (ref 26–34)
MCHC RBC AUTO-ENTMCNC: 35.2 G/DL (ref 31–37)
MCV RBC AUTO: 93.4 FL (ref 80–100)
MONOCYTES NFR BLD: 0.9 K/UL (ref 0.1–1.3)
MONOCYTES NFR BLD: 10 % (ref 1–7)
NEUTROPHILS NFR BLD: 63 % (ref 36–66)
NEUTS SEG NFR BLD: 6.4 K/UL (ref 1.3–9.1)
PLATELET # BLD AUTO: 130 K/UL (ref 150–450)
PMV BLD AUTO: 7.5 FL (ref 6–12)
POTASSIUM SERPL-SCNC: 4.7 MMOL/L (ref 3.7–5.3)
RBC # BLD AUTO: 4.74 M/UL (ref 4.5–5.9)
RSV RNA NPH QL NAA+NON-PROBE: NOT DETECTED
RV+EV RNA NPH QL NAA+NON-PROBE: NOT DETECTED
SARS-COV-2 RNA NPH QL NAA+NON-PROBE: NOT DETECTED
SODIUM SERPL-SCNC: 140 MMOL/L (ref 136–145)
SPECIMEN DESCRIPTION: NORMAL
WBC OTHER # BLD: 9.8 K/UL (ref 3.5–11)

## 2025-01-20 PROCEDURE — 6370000000 HC RX 637 (ALT 250 FOR IP): Performed by: NURSE PRACTITIONER

## 2025-01-20 PROCEDURE — 80048 BASIC METABOLIC PNL TOTAL CA: CPT

## 2025-01-20 PROCEDURE — 36415 COLL VENOUS BLD VENIPUNCTURE: CPT

## 2025-01-20 PROCEDURE — 85025 COMPLETE CBC W/AUTO DIFF WBC: CPT

## 2025-01-20 PROCEDURE — 6360000004 HC RX CONTRAST MEDICATION: Performed by: NURSE PRACTITIONER

## 2025-01-20 PROCEDURE — 93010 ELECTROCARDIOGRAM REPORT: CPT | Performed by: INTERNAL MEDICINE

## 2025-01-20 PROCEDURE — 82330 ASSAY OF CALCIUM: CPT

## 2025-01-20 PROCEDURE — 99233 SBSQ HOSP IP/OBS HIGH 50: CPT | Performed by: NURSE PRACTITIONER

## 2025-01-20 PROCEDURE — 93356 MYOCRD STRAIN IMG SPCKL TRCK: CPT | Performed by: INTERNAL MEDICINE

## 2025-01-20 PROCEDURE — 2500000003 HC RX 250 WO HCPCS: Performed by: NURSE PRACTITIONER

## 2025-01-20 PROCEDURE — 93356 MYOCRD STRAIN IMG SPCKL TRCK: CPT

## 2025-01-20 PROCEDURE — 93306 TTE W/DOPPLER COMPLETE: CPT | Performed by: INTERNAL MEDICINE

## 2025-01-20 PROCEDURE — 99223 1ST HOSP IP/OBS HIGH 75: CPT | Performed by: INTERNAL MEDICINE

## 2025-01-20 PROCEDURE — 0202U NFCT DS 22 TRGT SARS-COV-2: CPT

## 2025-01-20 PROCEDURE — 85379 FIBRIN DEGRADATION QUANT: CPT

## 2025-01-20 PROCEDURE — G0378 HOSPITAL OBSERVATION PER HR: HCPCS

## 2025-01-20 RX ORDER — ATROPINE SULFATE 0.1 MG/ML
0.5 INJECTION INTRAVENOUS EVERY 5 MIN PRN
Status: CANCELLED | OUTPATIENT
Start: 2025-01-20 | End: 2025-01-20

## 2025-01-20 RX ORDER — SODIUM CHLORIDE 0.9 % (FLUSH) 0.9 %
5-40 SYRINGE (ML) INJECTION PRN
Status: CANCELLED | OUTPATIENT
Start: 2025-01-20 | End: 2025-01-20

## 2025-01-20 RX ORDER — METOPROLOL TARTRATE 1 MG/ML
5 INJECTION, SOLUTION INTRAVENOUS EVERY 5 MIN PRN
Status: CANCELLED | OUTPATIENT
Start: 2025-01-20 | End: 2025-01-20

## 2025-01-20 RX ORDER — REGADENOSON 0.08 MG/ML
0.4 INJECTION, SOLUTION INTRAVENOUS
Status: CANCELLED | OUTPATIENT
Start: 2025-01-20

## 2025-01-20 RX ORDER — SODIUM CHLORIDE 9 MG/ML
500 INJECTION, SOLUTION INTRAVENOUS CONTINUOUS PRN
Status: CANCELLED | OUTPATIENT
Start: 2025-01-20 | End: 2025-01-20

## 2025-01-20 RX ORDER — NITROGLYCERIN 0.4 MG/1
0.4 TABLET SUBLINGUAL EVERY 5 MIN PRN
Status: CANCELLED | OUTPATIENT
Start: 2025-01-20 | End: 2025-01-20

## 2025-01-20 RX ORDER — AMINOPHYLLINE 25 MG/ML
50 INJECTION, SOLUTION INTRAVENOUS PRN
Status: CANCELLED | OUTPATIENT
Start: 2025-01-20 | End: 2025-01-20

## 2025-01-20 RX ORDER — ALBUTEROL SULFATE 90 UG/1
2 INHALANT RESPIRATORY (INHALATION) PRN
Status: CANCELLED | OUTPATIENT
Start: 2025-01-20 | End: 2025-01-20

## 2025-01-20 RX ORDER — ISOSORBIDE MONONITRATE 30 MG/1
30 TABLET, EXTENDED RELEASE ORAL EVERY EVENING
Status: DISCONTINUED | OUTPATIENT
Start: 2025-01-20 | End: 2025-01-21 | Stop reason: HOSPADM

## 2025-01-20 RX ADMIN — ISOSORBIDE MONONITRATE 90 MG: 30 TABLET, EXTENDED RELEASE ORAL at 10:51

## 2025-01-20 RX ADMIN — ISOSORBIDE MONONITRATE 30 MG: 30 TABLET, EXTENDED RELEASE ORAL at 18:16

## 2025-01-20 RX ADMIN — RANOLAZINE 1000 MG: 1000 TABLET, EXTENDED RELEASE ORAL at 20:18

## 2025-01-20 RX ADMIN — SODIUM CHLORIDE, PRESERVATIVE FREE 10 ML: 5 INJECTION INTRAVENOUS at 10:49

## 2025-01-20 RX ADMIN — TAMSULOSIN HYDROCHLORIDE 0.4 MG: 0.4 CAPSULE ORAL at 20:16

## 2025-01-20 RX ADMIN — METOPROLOL SUCCINATE 25 MG: 25 TABLET, EXTENDED RELEASE ORAL at 20:21

## 2025-01-20 RX ADMIN — RANOLAZINE 1000 MG: 1000 TABLET, EXTENDED RELEASE ORAL at 10:51

## 2025-01-20 RX ADMIN — SULFUR HEXAFLUORIDE 2.5 ML: KIT at 12:52

## 2025-01-20 RX ADMIN — SODIUM CHLORIDE, PRESERVATIVE FREE 10 ML: 5 INJECTION INTRAVENOUS at 00:00

## 2025-01-20 RX ADMIN — TAMSULOSIN HYDROCHLORIDE 0.4 MG: 0.4 CAPSULE ORAL at 10:50

## 2025-01-20 RX ADMIN — SODIUM CHLORIDE, PRESERVATIVE FREE 10 ML: 5 INJECTION INTRAVENOUS at 20:22

## 2025-01-20 RX ADMIN — ASPIRIN 81 MG: 81 TABLET, COATED ORAL at 10:49

## 2025-01-20 ASSESSMENT — PAIN SCALES - GENERAL: PAINLEVEL_OUTOF10: 0

## 2025-01-20 ASSESSMENT — ENCOUNTER SYMPTOMS
VOMITING: 0
NAUSEA: 0
DIARRHEA: 0
COUGH: 0
BACK PAIN: 0
ABDOMINAL PAIN: 0
SHORTNESS OF BREATH: 0

## 2025-01-20 NOTE — CARE COORDINATION
Case Management Assessment  Initial Evaluation    Date/Time of Evaluation: 1/20/2025 9:34AM  Assessment Completed by: Esperanza Mendoza RN    If patient is discharged prior to next notation, then this note serves as note for discharge by case management.    Patient Name: Irwin Castro                   YOB: 1950  Diagnosis: Chest pain, unspecified type [R07.9]  Angina pectoris without myocardial infarction (HCC) [I20.9]                   Date / Time: 1/19/2025  5:20 PM    Patient Admission Status: Observation   Readmission Risk (Low < 19, Mod (19-27), High > 27): No data recorded  Current PCP: Waleska Castillo APRN - CNP  PCP verified by CM? Yes    Chart Reviewed: Yes      History Provided by: Patient  Patient Orientation: Alert and Oriented    Patient Cognition: Alert    Hospitalization in the last 30 days (Readmission):  No    If yes, Readmission Assessment in CM Navigator will be completed.    Advance Directives:      Code Status: Full Code   Patient's Primary Decision Maker is: Legal Next of Kin      Discharge Planning:    Patient lives with: Spouse/Significant Other Type of Home: House  Primary Care Giver: Self  Patient Support Systems include: Spouse/Significant Other, Family Members, Children   Current Financial resources: Medicare, Medicaid  Current community resources: None  Current services prior to admission: Durable Medical Equipment            Current DME: Glucometer, Home Aerosol            Type of Home Care services:  None    ADLS  Prior functional level: Independent in ADLs/IADLs  Current functional level: Independent in ADLs/IADLs    PT AM-PAC:   /24  OT AM-PAC:   /24    Family can provide assistance at DC: Yes  Would you like Case Management to discuss the discharge plan with any other family members/significant others, and if so, who? No  Plans to Return to Present Housing: Yes  Other Identified Issues/Barriers to RETURNING to current housing: none  at discharge: N/A

## 2025-01-20 NOTE — PLAN OF CARE
Problem: Chronic Conditions and Co-morbidities  Goal: Patient's chronic conditions and co-morbidity symptoms are monitored and maintained or improved  Outcome: Progressing  Flowsheets (Taken 1/19/2025 2353)  Care Plan - Patient's Chronic Conditions and Co-Morbidity Symptoms are Monitored and Maintained or Improved:   Monitor and assess patient's chronic conditions and comorbid symptoms for stability, deterioration, or improvement   Collaborate with multidisciplinary team to address chronic and comorbid conditions and prevent exacerbation or deterioration   Update acute care plan with appropriate goals if chronic or comorbid symptoms are exacerbated and prevent overall improvement and discharge     Problem: Discharge Planning  Goal: Discharge to home or other facility with appropriate resources  Outcome: Progressing  Flowsheets (Taken 1/19/2025 2353)  Discharge to home or other facility with appropriate resources: Identify barriers to discharge with patient and caregiver     Problem: Cardiovascular - Adult  Goal: Maintains optimal cardiac output and hemodynamic stability  Outcome: Progressing  Flowsheets (Taken 1/19/2025 2353)  Maintains optimal cardiac output and hemodynamic stability: Monitor blood pressure and heart rate     Problem: Cardiovascular - Adult  Goal: Absence of cardiac dysrhythmias or at baseline  Outcome: Progressing  Flowsheets (Taken 1/19/2025 2353)  Absence of cardiac dysrhythmias or at baseline: Monitor cardiac rate and rhythm

## 2025-01-20 NOTE — ED NOTES
Report given to SADE Carrington from [facility].   Report method by phone   The following was reviewed with receiving RN:   Current vital signs:  /64   Pulse 64   Temp 97.8 °F (36.6 °C) (Oral)   Resp 21   Ht 1.778 m (5' 10\")   Wt 99.8 kg (220 lb)   SpO2 96%   BMI 31.57 kg/m²                      Any medication or safety alerts were reviewed. Any pending diagnostics and notifications were also reviewed, as well as any safety concerns or issues, abnormal labs, abnormal imaging, and abnormal assessment findings. Questions were answered.

## 2025-01-20 NOTE — H&P
[facility] Internal Medicine  David Galarza MD; Jc Powell MD; Kaushal Angulo MD; MD Jodie Manning MD; Sylvie Chavez MD    [facility] Internal Medicine   IN-PATIENT SERVICE   [facility]    HISTORY AND PHYSICAL EXAMINATION            Date:   1/20/2025  Patient name:  Irwin Castro  Date of admission:  1/19/2025  5:20 PM  MRN:   [id number]  Account:  [id number]  YOB: 1950  PCP:    Waleska Castillo APRN - CNP  Room:   2092/2092-01  Code Status:    Full Code    Chief Complaint:     Chief Complaint   Patient presents with    Chest Pain       History Obtained From:     Pt medical record and  nursing staff    History of Present Illness:     Irwin Castro is a 74 y.o. Non- / non  male who presents with Chest Pain   and is admitted to the hospital for the management of Angina pectoris without myocardial infarction (HCC).    Irwin Castro is a 74 y.o. Non- / non  male who presents with Chest Pain   and is admitted to the hospital for the management of Angina pectoris without myocardial infarction (HCC).     Patient's medical history significant for atrial flutter-chronically anticoagulated on Eliquis, CAD-s/p PCA, SSS-s/p cardiac pacemaker in situ, obesity, HTN, mixed hyperlipidemia, and diabetes mellitus type 2.     According to patient, was driving home from the park with his grandkids when he developed constant, sharp, nonradiating, midsternal chest pain.  Upon returning home, he took 2 NTG tablets at the same time and pain subsided to a light pressure/heaviness sensation.  Shortly after, he bent down to get the Roovyn water dish and became extremely dizzy, causing him to fall over into the wall.  He called EMS and reports that pressure sensation had resolved en route to the ED.  Chest pain was associated with dizziness/lightheadedness, diaphoresis, nausea, and shortness of breath.  Patient reports that

## 2025-01-20 NOTE — CONSULTS
AEROSPHERE 160-9-4.8 MCG/ACT AERO 2 puffs Inhalation Twice a day for 90 days 8/8/24 3/24/25 Yes Cm Hdez MD   aspirin 81 MG EC tablet Take 1 tablet by mouth daily 10/16/24  Yes Jose Navarrete DO   Evolocumab (REPATHA SURECLICK) 140 MG/ML SOAJ INJECT 1 PEN UNDER THE SKIN EVERY 2 WEEKS 10/7/24  Yes Jose Navarrete DO   isosorbide mononitrate (IMDUR) 30 MG extended release tablet Take 3 tablets by mouth daily 9/25/24  Yes Jose Navarrete DO   cyclobenzaprine (FLEXERIL) 5 MG tablet Take 1 tablet by mouth 2 times daily as needed for Muscle spasms   Yes ProviderCm MD   omeprazole (PRILOSEC) 20 MG delayed release capsule Take 1 capsule by mouth daily   Yes Cm Hdez MD   tamsulosin (FLOMAX) 0.4 MG capsule Take 1 capsule by mouth in the morning and at bedtime   Yes Cm Hdez MD   varenicline (CHANTIX) 1 MG tablet Take 1 tablet by mouth daily   Yes Cm Hdez MD   metFORMIN (GLUCOPHAGE) 500 MG tablet take 1 tablet by mouth WITH BREAKFAST then 2 tablets WITH EVENING MEAL  Patient taking differently: Take 1 tablet by mouth 2 times daily (with meals) 7/16/21  Yes Alfonso Jo APRN - CNP   albuterol sulfate  (90 Base) MCG/ACT inhaler Inhale 2 puffs into the lungs every 6 hours as needed for Shortness of Breath 7/16/21  Yes Alfonso Jo APRN - CNP   ipratropium (ATROVENT) 0.02 % nebulizer solution Take 2.5 mLs by nebulization 4 times daily as needed for Wheezing 12/15/23   Cm Hdez MD   albuterol (PROVENTIL) (2.5 MG/3ML) 0.083% nebulizer solution Take 3 mLs by nebulization 4 times daily as needed for Wheezing 12/15/23   Cm Hdez MD   Blood Glucose Monitoring Suppl (TRUE METRIX AIR GLUCOSE METER) w/Device KIT 1 kit by Does not apply route 2 times daily 8/28/19   Michelle Cannon APRN - CNP   blood glucose test strips (ASCENSIA AUTODISC VI;ONE TOUCH ULTRA TEST VI) strip 1 each by Other route 2 times daily As needed. 8/28/19   Michelle Cannon APRN -

## 2025-01-20 NOTE — ACP (ADVANCE CARE PLANNING)
Advance Care Planning     Advance Care Planning Activator (Inpatient)  Conversation Note      Date of ACP Conversation: 1/20/2025     Conversation Conducted with: Patient with Decision Making Capacity    ACP Activator: Esperanza Mendoza RN    Health Care Decision Maker:     Current Designated Health Care Decision Maker:     Click here to complete Healthcare Decision Makers including section of the Healthcare Decision Maker Relationship (ie \"Primary\")  Today we documented Decision Maker(s) consistent with Legal Next of Kin hierarchy.    Care Preferences    Ventilation:  \"If you were in your present state of health and suddenly became very ill and were unable to breathe on your own, what would your preference be about the use of a ventilator (breathing machine) if it were available to you?\"      Would the patient desire the use of ventilator (breathing machine)?: yes    \"If your health worsens and it becomes clear that your chance of recovery is unlikely, what would your preference be about the use of a ventilator (breathing machine) if it were available to you?\"     Would the patient desire the use of ventilator (breathing machine)?: No      Resuscitation  \"CPR works best to restart the heart when there is a sudden event, like a heart attack, in someone who is otherwise healthy. Unfortunately, CPR does not typically restart the heart for people who have serious health conditions or who are very sick.\"    \"In the event your heart stopped as a result of an underlying serious health condition, would you want attempts to be made to restart your heart (answer \"yes\" for attempt to resuscitate) or would you prefer a natural death (answer \"no\" for do not attempt to resuscitate)?\" yes       [] Yes   [] No   Educated Patient / Decision Maker regarding differences between Advance Directives and portable DNR orders.    Length of ACP Conversation in minutes:      Conversation Outcomes:  ACP discussion completed    Follow-up plan:

## 2025-01-21 ENCOUNTER — APPOINTMENT (OUTPATIENT)
Dept: NUCLEAR MEDICINE | Age: 75
End: 2025-01-21
Payer: MEDICARE

## 2025-01-21 ENCOUNTER — HOSPITAL ENCOUNTER (OUTPATIENT)
Age: 75
Setting detail: OBSERVATION
Discharge: HOME | End: 2025-01-23
Payer: MEDICARE

## 2025-01-21 VITALS
HEIGHT: 70 IN | OXYGEN SATURATION: 97 % | RESPIRATION RATE: 18 BRPM | TEMPERATURE: 97.6 F | WEIGHT: 212 LBS | BODY MASS INDEX: 30.35 KG/M2 | HEART RATE: 62 BPM | DIASTOLIC BLOOD PRESSURE: 82 MMHG | SYSTOLIC BLOOD PRESSURE: 141 MMHG

## 2025-01-21 LAB
ANION GAP SERPL CALCULATED.3IONS-SCNC: 9 MMOL/L (ref 9–16)
BASOPHILS # BLD: 0 K/UL (ref 0–0.2)
BASOPHILS NFR BLD: 0 % (ref 0–2)
BUN SERPL-MCNC: 9 MG/DL (ref 8–23)
CALCIUM SERPL-MCNC: 8.7 MG/DL (ref 8.6–10.4)
CHLORIDE SERPL-SCNC: 104 MMOL/L (ref 98–107)
CO2 SERPL-SCNC: 24 MMOL/L (ref 20–31)
CREAT SERPL-MCNC: 0.7 MG/DL (ref 0.7–1.2)
ECHO BSA: 2.18 M2
EOSINOPHIL # BLD: 0.1 K/UL (ref 0–0.4)
EOSINOPHILS RELATIVE PERCENT: 1 % (ref 0–4)
ERYTHROCYTE [DISTWIDTH] IN BLOOD BY AUTOMATED COUNT: 14.7 % (ref 11.5–14.9)
GFR, ESTIMATED: >90 ML/MIN/1.73M2
GLUCOSE BLD-MCNC: 136 MG/DL (ref 75–110)
GLUCOSE BLD-MCNC: 190 MG/DL (ref 75–110)
GLUCOSE SERPL-MCNC: 142 MG/DL (ref 74–99)
HCT VFR BLD AUTO: 47 % (ref 41–53)
HGB BLD-MCNC: 16.2 G/DL (ref 13.5–17.5)
LYMPHOCYTES NFR BLD: 2.2 K/UL (ref 1–4.8)
LYMPHOCYTES RELATIVE PERCENT: 29 % (ref 24–44)
MCH RBC QN AUTO: 32.1 PG (ref 26–34)
MCHC RBC AUTO-ENTMCNC: 34.5 G/DL (ref 31–37)
MCV RBC AUTO: 93 FL (ref 80–100)
MONOCYTES NFR BLD: 0.7 K/UL (ref 0.1–1.3)
MONOCYTES NFR BLD: 9 % (ref 1–7)
NEUTROPHILS NFR BLD: 61 % (ref 36–66)
NEUTS SEG NFR BLD: 4.5 K/UL (ref 1.3–9.1)
NUC STRESS EJECTION FRACTION: 56 %
PLATELET # BLD AUTO: 150 K/UL (ref 150–450)
PMV BLD AUTO: 7.4 FL (ref 6–12)
POTASSIUM SERPL-SCNC: 4.1 MMOL/L (ref 3.7–5.3)
RBC # BLD AUTO: 5.06 M/UL (ref 4.5–5.9)
SODIUM SERPL-SCNC: 137 MMOL/L (ref 136–145)
STRESS BASELINE DIAS BP: 84 MMHG
STRESS BASELINE HR: 64 BPM
STRESS BASELINE SYS BP: 140 MMHG
STRESS ESTIMATED WORKLOAD: 1 METS
STRESS PEAK DIAS BP: 76 MMHG
STRESS PEAK SYS BP: 111 MMHG
STRESS PERCENT HR ACHIEVED: 47 %
STRESS POST PEAK HR: 69 BPM
STRESS RATE PRESSURE PRODUCT: 7659 BPM*MMHG
STRESS ST DEPRESSION: 0 MM
STRESS STAGE RECOVERY 1 BP: NORMAL MMHG
STRESS STAGE RECOVERY 1 DURATION: 1 MIN:SEC
STRESS STAGE RECOVERY 1 HR: 69 BPM
STRESS STAGE RECOVERY 2 BP: NORMAL MMHG
STRESS STAGE RECOVERY 2 DURATION: 3 MIN:SEC
STRESS STAGE RECOVERY 2 HR: 63 BPM
STRESS STAGE RECOVERY 3 BP: NORMAL MMHG
STRESS STAGE RECOVERY 3 DURATION: 6 MIN:SEC
STRESS STAGE RECOVERY 3 HR: 61 BPM
STRESS TARGET HR: 146 BPM
TID: 0.99
WBC OTHER # BLD: 7.5 K/UL (ref 3.5–11)

## 2025-01-21 PROCEDURE — G0378 HOSPITAL OBSERVATION PER HR: HCPCS

## 2025-01-21 PROCEDURE — 2500000003 HC RX 250 WO HCPCS: Performed by: INTERNAL MEDICINE

## 2025-01-21 PROCEDURE — 85025 COMPLETE CBC W/AUTO DIFF WBC: CPT

## 2025-01-21 PROCEDURE — 6360000002 HC RX W HCPCS: Performed by: INTERNAL MEDICINE

## 2025-01-21 PROCEDURE — 80048 BASIC METABOLIC PNL TOTAL CA: CPT

## 2025-01-21 PROCEDURE — 2500000003 HC RX 250 WO HCPCS: Performed by: NURSE PRACTITIONER

## 2025-01-21 PROCEDURE — 78452 HT MUSCLE IMAGE SPECT MULT: CPT

## 2025-01-21 PROCEDURE — 6370000000 HC RX 637 (ALT 250 FOR IP): Performed by: NURSE PRACTITIONER

## 2025-01-21 PROCEDURE — 99239 HOSP IP/OBS DSCHRG MGMT >30: CPT | Performed by: INTERNAL MEDICINE

## 2025-01-21 PROCEDURE — 93017 CV STRESS TEST TRACING ONLY: CPT

## 2025-01-21 PROCEDURE — 6370000000 HC RX 637 (ALT 250 FOR IP): Performed by: INTERNAL MEDICINE

## 2025-01-21 PROCEDURE — 99233 SBSQ HOSP IP/OBS HIGH 50: CPT | Performed by: NURSE PRACTITIONER

## 2025-01-21 PROCEDURE — 93018 CV STRESS TEST I&R ONLY: CPT | Performed by: INTERNAL MEDICINE

## 2025-01-21 PROCEDURE — 3430000000 HC RX DIAGNOSTIC RADIOPHARMACEUTICAL: Performed by: INTERNAL MEDICINE

## 2025-01-21 PROCEDURE — 36415 COLL VENOUS BLD VENIPUNCTURE: CPT

## 2025-01-21 PROCEDURE — 93016 CV STRESS TEST SUPVJ ONLY: CPT | Performed by: INTERNAL MEDICINE

## 2025-01-21 PROCEDURE — 82947 ASSAY GLUCOSE BLOOD QUANT: CPT

## 2025-01-21 PROCEDURE — A9500 TC99M SESTAMIBI: HCPCS | Performed by: INTERNAL MEDICINE

## 2025-01-21 RX ORDER — ALBUTEROL SULFATE 90 UG/1
2 INHALANT RESPIRATORY (INHALATION) PRN
Status: ACTIVE | OUTPATIENT
Start: 2025-01-21 | End: 2025-01-21

## 2025-01-21 RX ORDER — SODIUM CHLORIDE 9 MG/ML
500 INJECTION, SOLUTION INTRAVENOUS CONTINUOUS PRN
Status: ACTIVE | OUTPATIENT
Start: 2025-01-21 | End: 2025-01-21

## 2025-01-21 RX ORDER — ATROPINE SULFATE 0.1 MG/ML
0.5 INJECTION INTRAVENOUS EVERY 5 MIN PRN
Status: ACTIVE | OUTPATIENT
Start: 2025-01-21 | End: 2025-01-21

## 2025-01-21 RX ORDER — METOPROLOL TARTRATE 1 MG/ML
5 INJECTION, SOLUTION INTRAVENOUS EVERY 5 MIN PRN
Status: ACTIVE | OUTPATIENT
Start: 2025-01-21 | End: 2025-01-21

## 2025-01-21 RX ORDER — NITROGLYCERIN 0.4 MG/1
0.4 TABLET SUBLINGUAL EVERY 5 MIN PRN
Status: ACTIVE | OUTPATIENT
Start: 2025-01-21 | End: 2025-01-21

## 2025-01-21 RX ORDER — SODIUM CHLORIDE 0.9 % (FLUSH) 0.9 %
5-40 SYRINGE (ML) INJECTION PRN
Status: ACTIVE | OUTPATIENT
Start: 2025-01-21 | End: 2025-01-21

## 2025-01-21 RX ORDER — LORAZEPAM 1 MG/1
1 TABLET ORAL ONCE
Status: COMPLETED | OUTPATIENT
Start: 2025-01-21 | End: 2025-01-21

## 2025-01-21 RX ORDER — TETRAKIS(2-METHOXYISOBUTYLISOCYANIDE)COPPER(I) TETRAFLUOROBORATE 1 MG/ML
15 INJECTION, POWDER, LYOPHILIZED, FOR SOLUTION INTRAVENOUS
Status: COMPLETED | OUTPATIENT
Start: 2025-01-21 | End: 2025-01-21

## 2025-01-21 RX ORDER — REGADENOSON 0.08 MG/ML
0.4 INJECTION, SOLUTION INTRAVENOUS
Status: COMPLETED | OUTPATIENT
Start: 2025-01-21 | End: 2025-01-21

## 2025-01-21 RX ORDER — SODIUM CHLORIDE 0.9 % (FLUSH) 0.9 %
10 SYRINGE (ML) INJECTION PRN
Status: DISCONTINUED | OUTPATIENT
Start: 2025-01-21 | End: 2025-01-21 | Stop reason: HOSPADM

## 2025-01-21 RX ORDER — AMINOPHYLLINE 25 MG/ML
50 INJECTION, SOLUTION INTRAVENOUS PRN
Status: ACTIVE | OUTPATIENT
Start: 2025-01-21 | End: 2025-01-21

## 2025-01-21 RX ORDER — TETRAKIS(2-METHOXYISOBUTYLISOCYANIDE)COPPER(I) TETRAFLUOROBORATE 1 MG/ML
35 INJECTION, POWDER, LYOPHILIZED, FOR SOLUTION INTRAVENOUS
Status: COMPLETED | OUTPATIENT
Start: 2025-01-21 | End: 2025-01-21

## 2025-01-21 RX ADMIN — Medication 17.2 MILLICURIE: at 09:07

## 2025-01-21 RX ADMIN — ASPIRIN 81 MG: 81 TABLET, COATED ORAL at 10:30

## 2025-01-21 RX ADMIN — RANOLAZINE 1000 MG: 1000 TABLET, EXTENDED RELEASE ORAL at 10:30

## 2025-01-21 RX ADMIN — REGADENOSON 0.4 MG: 0.08 INJECTION, SOLUTION INTRAVENOUS at 09:05

## 2025-01-21 RX ADMIN — SODIUM CHLORIDE, PRESERVATIVE FREE 10 ML: 5 INJECTION INTRAVENOUS at 07:43

## 2025-01-21 RX ADMIN — LORAZEPAM 1 MG: 1 TABLET ORAL at 07:54

## 2025-01-21 RX ADMIN — ISOSORBIDE MONONITRATE 90 MG: 30 TABLET, EXTENDED RELEASE ORAL at 10:31

## 2025-01-21 RX ADMIN — TAMSULOSIN HYDROCHLORIDE 0.4 MG: 0.4 CAPSULE ORAL at 10:29

## 2025-01-21 RX ADMIN — SODIUM CHLORIDE, PRESERVATIVE FREE 10 ML: 5 INJECTION INTRAVENOUS at 12:33

## 2025-01-21 RX ADMIN — Medication 38 MILLICURIE: at 12:33

## 2025-01-21 NOTE — PLAN OF CARE
Problem: Chronic Conditions and Co-morbidities  Goal: Patient's chronic conditions and co-morbidity symptoms are monitored and maintained or improved  1/21/2025 1409 by Oma Rincon RN  Outcome: Adequate for Discharge     Problem: Discharge Planning  Goal: Discharge to home or other facility with appropriate resources  1/21/2025 1409 by Oma Rincon RN  Outcome: Adequate for Discharge     Problem: Cardiovascular - Adult  Goal: Maintains optimal cardiac output and hemodynamic stability  1/21/2025 1409 by Oma Rincon RN  Outcome: Adequate for Discharge     Problem: Cardiovascular - Adult  Goal: Absence of cardiac dysrhythmias or at baseline  1/21/2025 1409 by Oma Rincon RN  Outcome: Adequate for Discharge     Problem: Neurosensory - Adult  Goal: Achieves stable or improved neurological status  1/21/2025 1409 by Oma Rincon RN  Outcome: Adequate for Discharge     Problem: Respiratory - Adult  Goal: Achieves optimal ventilation and oxygenation  1/21/2025 1409 by Oma Rincon RN  Outcome: Adequate for Discharge     Problem: Skin/Tissue Integrity - Adult  Goal: Skin integrity remains intact  1/21/2025 1409 by Oma Rincon RN  Outcome: Adequate for Discharge     Problem: Musculoskeletal - Adult  Goal: Return mobility to safest level of function  1/21/2025 1409 by Oma Rincon RN  Outcome: Adequate for Discharge     Problem: Gastrointestinal - Adult  Goal: Minimal or absence of nausea and vomiting  1/21/2025 1409 by Oma Rincon RN  Outcome: Adequate for Discharge     Problem: Genitourinary - Adult  Goal: Absence of urinary retention  1/21/2025 1409 by Oma Rincon RN  Outcome: Adequate for Discharge     Problem: Infection - Adult  Goal: Absence of infection at discharge  1/21/2025 1409 by Oma Rincon RN  Outcome: Adequate for Discharge     Problem: Metabolic/Fluid and Electrolytes - Adult  Goal: Electrolytes maintained within normal limits  1/21/2025 1409 by Mckenzie

## 2025-01-21 NOTE — CARE COORDINATION
ONGOING DISCHARGE PLAN:    Patient is alert and oriented x4.    Spoke with patient regarding discharge plan and patient confirms that plan is still home no needs.    Cardiac Consult. Echo EF 45-50%. Stress test today. Resp. Panel negative.     Will continue to follow for additional discharge needs.    If patient is discharged prior to next notation, then this note serves as note for discharge by case management.    Electronically signed by Esperanza Mendoza RN on 1/21/2025 at 12:37 PM

## 2025-01-21 NOTE — PROGRESS NOTES
[facility] Internal Medicine  David Galarza MD; Jc Powell MD; Kaushal Angulo MD; MD Jodie Manning MD; Sylvie Chavez MD  [facility] Internal Medicine   IN-PATIENT SERVICE  [facility]                 Date:   1/19/2025  Patientname:  Irwin Castro  Date of admission:  1/19/2025  5:20 PM  MRN:   [id number]  Account:  [id number]  YOB: 1950  PCP:    Waleska Castillo APRN - CNP  Room:   2092/2092-01  Code Status:    Full Code      Chief Complaint:     Chief Complaint   Patient presents with    Chest Pain     History of Present Illness:     Irwin Castro is a 74 y.o. Non- / non  male who presents with Chest Pain   and is admitted to the hospital for the management of Angina pectoris without myocardial infarction (HCC).    Patient's medical history significant for atrial flutter-chronically anticoagulated on Eliquis, CAD-s/p PCA, SSS-s/p cardiac pacemaker in situ, obesity, HTN, mixed hyperlipidemia, and diabetes mellitus type 2.    According to patient, was driving home from the park with his grandkids when he developed constant, sharp, nonradiating, midsternal chest pain.  Upon returning home, he took 2 NTG tablets at the same time and pain subsided to a light pressure/heaviness sensation.  Shortly after, he bent down to get the dogs water dish and became extremely dizzy, causing him to fall over into the wall.  He called EMS and reports that pressure sensation had resolved en route to the ED.  Chest pain was associated with dizziness/lightheadedness, diaphoresis, nausea, and shortness of breath.  Patient reports that he has been stressed lately, as he is helping raise his grandchildren and his wife was recently discharged from the hospital following a CVA.  Patient reports that he has multiple stents, including a \"stent and a stent.\"  He is not currently taking Plavix due to it causing extensive bruising in the past.  Reports that 
  [facility]  Progress Note                   Date:   1/21/2025  Patient name: Irwin Castro  Date of admission:  1/19/2025  5:20 PM  MRN:   [id number]  YOB: 1950  PCP: Waleska Castillo, CHRISTIANA - CNP    Reason for Admission: Chest pain, unspecified type [R07.9]  Angina pectoris without myocardial infarction (HCC) [I20.9]    Subjective:       Clinical Changes /Abnormalities: Seen & examined in stress lab. He states after thinking about it and talking to his brother yesterday he decided to \"go for it.\" Denies any recurrent chest pain/pressure. States overnight he woke up in \"dripping sweat\" which he states he believes was his blood surgar because they are not giving him his Metformin. He denied any CP with this. ECG today stable. Labs and vitals reviewed.     Review of Systems    Medications:   Scheduled Meds:   isosorbide mononitrate  30 mg Oral QPM    sodium chloride flush  5-40 mL IntraVENous 2 times per day    apixaban  5 mg Oral BID    aspirin  81 mg Oral Daily    metoprolol succinate  25 mg Oral Nightly    tamsulosin  0.4 mg Oral BID    isosorbide mononitrate  90 mg Oral Daily    ranolazine  1,000 mg Oral BID     Continuous Infusions:   sodium chloride       CBC:   Recent Labs     01/19/25  1731 01/20/25  0604 01/21/25  0452   WBC 9.4 9.8 7.5   HGB 16.1 15.6 16.2   * 130* 150     BMP:    Recent Labs     01/19/25  1731 01/20/25  0604 01/21/25  0452    140 137   K 3.5* 4.7 4.1    106 104   CO2 26 26 24   BUN 11 11 9   CREATININE 0.7 0.8 0.7   GLUCOSE 214* 136* 142*     Hepatic:No results for input(s): \"AST\", \"ALT\", \"BILITOT\", \"ALKPHOS\" in the last 72 hours.    Invalid input(s): \"ALB\"  Troponin:   Recent Labs     01/19/25  1731 01/19/25  1911   TROPHS 19 15     BNP: No results for input(s): \"BNP\" in the last 72 hours.  Lipids: No results for input(s): \"CHOL\", \"HDL\" in the last 72 hours.    Invalid input(s): \"LDLCALCU\"  INR: No results for input(s): \"INR\" in the 
[facility]   OCCUPATIONAL THERAPY MISSED TREATMENT NOTE   INPATIENT   Date: 25  Patient Name: Irwin Castro       Room:   MRN: [id number]   Account #: [id number]    : 1950  (74 y.o.)  Gender: male                 REASON FOR MISSED TREATMENT:  25    -   OT being discontinued at this time. Patient functioning at Premorbid Level  No further needs  - pt up independently in room, reports he is at baseline function with no skilled occupational therapy needs. Pt independent with self-care/mobility. D/C OT - please reorder should future OT needs arise.    1348         Electronically signed by LEE Liu on 25 at 1:55 PM EST    
Patient called out upset over night sweats; believing to be from not having his metformin. Additionally upset stating \"Nobody has checked my sugar except for once in the ER.\" Blood sugar check 136. GAYLA Flanagan made aware; order for POCT ACHS checks received.   
Physical Therapy        Physical Therapy Cancel Note      DATE: 2025    NAME: Irwin Castro  MRN: [id number]   : 1950      Patient not seen this date for Physical Therapy due to:    Patient independent with functional mobility. Will defer PT evaluation at this time. Please reorder PT if future needs arise.   Pt demonstrates safe mobility in the room.    Electronically signed by Delores Vick PT on 2025 at 2:03 PM      
Writer agrees with Марина Del Valle RN charting  
SpO2 97%   BMI 30.42 kg/m²   Temp (24hrs), Av.9 °F (36.6 °C), Min:97.6 °F (36.4 °C), Max:98.2 °F (36.8 °C)    Recent Labs     25  0403 25  1056   POCGLU 136* 190*     No intake or output data in the 24 hours ending 25 1230      General Appearance: alert, well appearing, and in no acute distress  Mental status: oriented to person, place, and time  Head: normocephalic, atraumatic  Eye: no icterus, redness, pupils equal and reactive, extraocular eye movements intact, conjunctiva clear  Ear: normal external ear, no discharge, hearing intact  Nose: no drainage noted  Mouth: mucous membranes moist  Neck: supple, no carotid bruits, thyroid not palpable  Lungs: Bilateral equal air entry, clear to ausculation, no wheezing, rales or rhonchi, normal effort  Cardiovascular: normal rate, regular rhythm, no murmur, gallop, rub  Abdomen: Soft, nontender, nondistended, normal bowel sounds, no hepatomegaly or splenomegaly  Neurologic: There are no new focal motor or sensory deficits, normal muscle tone and bulk, no abnormal sensation, normal speech, cranial nerves II through XII grossly intact  Skin: No gross lesions, rashes, bruising or bleeding on exposed skin area  Extremities: peripheral pulses palpable, no pedal edema or calf pain with palpation  Psych: normal affect    Investigations:      Laboratory Testing:  Recent Results (from the past 24 hour(s))   Echo (TTE) complete (PRN contrast/bubble/strain/3D)    Collection Time: 25 12:50 PM   Result Value Ref Range    Body Surface Area 2.18 m2    LV EDV A2C 105 mL    LV EDV A4C 100 mL    LV ESV A2C 57 mL    LV ESV A4C 59 mL    LV Ejection Fraction A2C 46 %    LV Ejection Fraction A4C 41 %    EF BP 45 (A) 55 - 100 %    IVSd 1.4 (A) 0.6 - 1.0 cm    LVIDd 4.3 4.2 - 5.9 cm    LVIDs 3.2 cm    LVOT Diameter 2.1 cm    LVOT Mean Gradient 3 mmHg    LVOT VTI 21.2 cm    LVOT Peak Velocity 1.0 m/s    LVOT Peak Gradient 4 mmHg    LVPWd 1.4 (A) 0.6 - 1.0 cm    LV E'

## 2025-01-21 NOTE — PLAN OF CARE
Problem: Chronic Conditions and Co-morbidities  Goal: Patient's chronic conditions and co-morbidity symptoms are monitored and maintained or improved  1/21/2025 0355 by Reed King RN  Outcome: Progressing  1/21/2025 0354 by Reed King RN  Outcome: Progressing     Problem: Discharge Planning  Goal: Discharge to home or other facility with appropriate resources  1/21/2025 0355 by Reed King RN  Outcome: Progressing  1/21/2025 0354 by Reed King RN  Outcome: Progressing     Problem: Cardiovascular - Adult  Goal: Maintains optimal cardiac output and hemodynamic stability  1/21/2025 0355 by Reed King RN  Outcome: Progressing  1/21/2025 0354 by Reed King RN  Outcome: Progressing  Goal: Absence of cardiac dysrhythmias or at baseline  1/21/2025 0355 by Reed King RN  Outcome: Progressing  1/21/2025 0354 by Reed King RN  Outcome: Progressing     Problem: Pain  Goal: Verbalizes/displays adequate comfort level or baseline comfort level  1/21/2025 0355 by Reed King RN  Outcome: Progressing  1/21/2025 0354 by Reed King RN  Outcome: Progressing     Problem: Neurosensory - Adult  Goal: Achieves stable or improved neurological status  Outcome: Progressing     Problem: Respiratory - Adult  Goal: Achieves optimal ventilation and oxygenation  Outcome: Progressing     Problem: Skin/Tissue Integrity - Adult  Goal: Skin integrity remains intact  Outcome: Progressing     Problem: Musculoskeletal - Adult  Goal: Return mobility to safest level of function  Outcome: Progressing     Problem: Gastrointestinal - Adult  Goal: Minimal or absence of nausea and vomiting  Outcome: Progressing     Problem: Genitourinary - Adult  Goal: Absence of urinary retention  Outcome: Progressing     Problem: Infection - Adult  Goal: Absence of infection at discharge  Outcome: Progressing     Problem: Metabolic/Fluid and Electrolytes - Adult  Goal: Electrolytes maintained within normal limits  Outcome:

## 2025-01-21 NOTE — PLAN OF CARE
Stress reviewed. Negative for ischemia  Continue Imdur 90mg in AM and 30mg in PM    OK for d/c from CV standpoint with OP f/u in clinic    Klaudia Mccall, APRN - CNP

## 2025-01-23 NOTE — DISCHARGE SUMMARY
IN-PATIENT SERVICE   [facility] Internal Medicine    Discharge Summary     Patient ID: Irwin Castro  :  1950   MRN: [id number]     ACCOUNT:  [id number]   Patient's PCP: Waleska Castillo APRN - CNP  Admit Date: 2025   Discharge Date: 2025    Length of Stay: 0  Code Status:  Prior  Admitting Physician: Kaushal Angulo MD  Discharge Physician: Kaushal Angulo MD     Active Discharge Diagnoses:     Primary Problem  Angina pectoris without myocardial infarction (HCC)      Hospital Problems  Active Hospital Problems    Diagnosis Date Noted    Angina pectoris without myocardial infarction (HCC) [I20.9] 2025       Admission Condition:  fair     Discharged Condition: fair    Hospital Stay:     Hospital Course:  Irwin Castro is a 74 y.o. male who was admitted for the management of Angina pectoris without myocardial infarction (HCC) , presented to ER with Chest Pain    74-year-old  gentleman history of coronary disease status post stent  and  recent stress in 2024 was lower  Patient has chronic angina on Ranexa and Imdur  Off Plavix due to bruising  History of V. tach on low-dose beta-blocker sick sinus syndrome post pacemaker  History of paroxysmal atrial fibrillation and flutter on Eliquis admitted with  Admitted with fatigue some dyspnea and chest pain worse by bending over and by deep breathing  Chest x-ray shows possible left lower lobe  D-dimer is negative  Troponin elevation patient declined stress test  Atypical chest pain  Cardio medication adjusted  Viral panel negative  Stress test done  Part two pending  No cp  If stress negative ok to dc  Meds and dc orders done  Restarted metformin    Significant therapeutic interventions:     Significant Diagnostic Studies:   Labs / Micro:        Radiology:    Nuclear stress test with myocardial perfusion    Result Date: 2025    Stress Combined Conclusion: The study is positive for myocardial

## 2025-05-15 ENCOUNTER — TRANSCRIBE ORDERS (OUTPATIENT)
Dept: ADMINISTRATIVE | Age: 75
End: 2025-05-15

## 2025-05-15 DIAGNOSIS — Z12.2 ENCOUNTER FOR SCREENING FOR MALIGNANT NEOPLASM OF RESPIRATORY ORGANS: Primary | ICD-10-CM

## 2025-05-16 ENCOUNTER — HOSPITAL ENCOUNTER (INPATIENT)
Age: 75
LOS: 1 days | Discharge: HOME | DRG: 190 | End: 2025-05-17
Attending: STUDENT IN AN ORGANIZED HEALTH CARE EDUCATION/TRAINING PROGRAM | Admitting: INTERNAL MEDICINE
Payer: MEDICARE

## 2025-05-16 ENCOUNTER — APPOINTMENT (OUTPATIENT)
Dept: GENERAL RADIOLOGY | Age: 75
DRG: 190 | End: 2025-05-16
Payer: MEDICARE

## 2025-05-16 DIAGNOSIS — J44.1 COPD EXACERBATION (HCC): Primary | ICD-10-CM

## 2025-05-16 LAB
ALBUMIN SERPL-MCNC: 4.1 G/DL (ref 3.5–5.2)
ALP SERPL-CCNC: 60 U/L (ref 40–129)
ALT SERPL-CCNC: 13 U/L (ref 10–50)
ANION GAP SERPL CALCULATED.3IONS-SCNC: 11 MMOL/L (ref 9–16)
AST SERPL-CCNC: 20 U/L (ref 10–50)
B PARAP IS1001 DNA NPH QL NAA+NON-PROBE: NOT DETECTED
B PERT DNA SPEC QL NAA+PROBE: NOT DETECTED
BASOPHILS # BLD: 0.1 K/UL (ref 0–0.2)
BASOPHILS NFR BLD: 1 % (ref 0–2)
BILIRUB SERPL-MCNC: 0.4 MG/DL (ref 0–1.2)
BNP SERPL-MCNC: 265 PG/ML (ref 0–300)
BODY TEMPERATURE: 37
BUN SERPL-MCNC: 11 MG/DL (ref 8–23)
C PNEUM DNA NPH QL NAA+NON-PROBE: NOT DETECTED
CALCIUM SERPL-MCNC: 8.9 MG/DL (ref 8.6–10.4)
CHLORIDE SERPL-SCNC: 105 MMOL/L (ref 98–107)
CHP ED QC CHECK: NORMAL
CO2 SERPL-SCNC: 25 MMOL/L (ref 20–31)
COHGB MFR BLD: 1.4 % (ref 0–5)
CREAT SERPL-MCNC: 0.8 MG/DL (ref 0.7–1.2)
EKG ATRIAL RATE: 62 BPM
EKG P-R INTERVAL: 216 MS
EKG Q-T INTERVAL: 478 MS
EKG QRS DURATION: 138 MS
EKG QTC CALCULATION (BAZETT): 485 MS
EKG R AXIS: -70 DEGREES
EKG T AXIS: 107 DEGREES
EKG VENTRICULAR RATE: 62 BPM
EOSINOPHIL # BLD: 0.1 K/UL (ref 0–0.4)
EOSINOPHILS RELATIVE PERCENT: 1 % (ref 0–4)
ERYTHROCYTE [DISTWIDTH] IN BLOOD BY AUTOMATED COUNT: 14.4 % (ref 11.5–14.9)
FLUAV RNA NPH QL NAA+NON-PROBE: NOT DETECTED
FLUAV RNA RESP QL NAA+PROBE: NOT DETECTED
FLUBV RNA NPH QL NAA+NON-PROBE: NOT DETECTED
FLUBV RNA RESP QL NAA+PROBE: NOT DETECTED
GAS FLOW.O2 O2 DELIVERY SYS: ABNORMAL L/MIN
GFR, ESTIMATED: >90 ML/MIN/1.73M2
GLUCOSE BLD-MCNC: 195 MG/DL
GLUCOSE BLD-MCNC: 195 MG/DL (ref 75–110)
GLUCOSE BLD-MCNC: 277 MG/DL (ref 75–110)
GLUCOSE BLD-MCNC: 284 MG/DL (ref 75–110)
GLUCOSE SERPL-MCNC: 107 MG/DL (ref 74–99)
HADV DNA NPH QL NAA+NON-PROBE: NOT DETECTED
HCO3 VENOUS: 26.1 MMOL/L (ref 24–30)
HCOV 229E RNA NPH QL NAA+NON-PROBE: NOT DETECTED
HCOV HKU1 RNA NPH QL NAA+NON-PROBE: NOT DETECTED
HCOV NL63 RNA NPH QL NAA+NON-PROBE: NOT DETECTED
HCOV OC43 RNA NPH QL NAA+NON-PROBE: NOT DETECTED
HCT VFR BLD AUTO: 47.5 % (ref 41–53)
HGB BLD-MCNC: 16 G/DL (ref 13.5–17.5)
HMPV RNA NPH QL NAA+NON-PROBE: NOT DETECTED
HPIV1 RNA NPH QL NAA+NON-PROBE: NOT DETECTED
HPIV2 RNA NPH QL NAA+NON-PROBE: NOT DETECTED
HPIV3 RNA NPH QL NAA+NON-PROBE: NOT DETECTED
HPIV4 RNA NPH QL NAA+NON-PROBE: NOT DETECTED
INR PPP: 1.2
LIPASE SERPL-CCNC: 125 U/L (ref 13–60)
LYMPHOCYTES NFR BLD: 2.9 K/UL (ref 1–4.8)
LYMPHOCYTES RELATIVE PERCENT: 37 % (ref 24–44)
M PNEUMO DNA NPH QL NAA+NON-PROBE: NOT DETECTED
MAGNESIUM SERPL-MCNC: 1.4 MG/DL (ref 1.6–2.4)
MCH RBC QN AUTO: 31.1 PG (ref 26–34)
MCHC RBC AUTO-ENTMCNC: 33.8 G/DL (ref 31–37)
MCV RBC AUTO: 92 FL (ref 80–100)
METHEMOGLOBIN: 0.3 % (ref 0–1.9)
MONOCYTES NFR BLD: 0.8 K/UL (ref 0.1–1.3)
MONOCYTES NFR BLD: 9 % (ref 1–7)
NEUTROPHILS NFR BLD: 52 % (ref 36–66)
NEUTS SEG NFR BLD: 4.2 K/UL (ref 1.3–9.1)
O2 SAT, VEN: 63.1 % (ref 60–85)
PCO2 VENOUS: 38.8 MM HG (ref 39–55)
PH VENOUS: 7.45 (ref 7.32–7.42)
PLATELET # BLD AUTO: 180 K/UL (ref 150–450)
PMV BLD AUTO: 7.2 FL (ref 6–12)
PO2 VENOUS: 31 MM HG (ref 30–50)
POSITIVE BASE EXCESS, VEN: 2 MMOL/L (ref 0–2)
POTASSIUM SERPL-SCNC: 3.5 MMOL/L (ref 3.7–5.3)
PROCALCITONIN SERPL-MCNC: 0.04 NG/ML (ref 0–0.09)
PROT SERPL-MCNC: 6.5 G/DL (ref 6.6–8.7)
PROTHROMBIN TIME: 16.6 SEC (ref 11.8–14.6)
RBC # BLD AUTO: 5.16 M/UL (ref 4.5–5.9)
RSV RNA NPH QL NAA+NON-PROBE: NOT DETECTED
RV+EV RNA NPH QL NAA+NON-PROBE: NOT DETECTED
SARS-COV-2 RNA NPH QL NAA+NON-PROBE: NOT DETECTED
SARS-COV-2 RNA RESP QL NAA+PROBE: NOT DETECTED
SODIUM SERPL-SCNC: 141 MMOL/L (ref 136–145)
SOURCE: NORMAL
SPECIMEN DESCRIPTION: NORMAL
SPECIMEN DESCRIPTION: NORMAL
TROPONIN I SERPL HS-MCNC: 13 NG/L (ref 0–22)
TROPONIN I SERPL HS-MCNC: 15 NG/L (ref 0–22)
WBC OTHER # BLD: 8 K/UL (ref 3.5–11)

## 2025-05-16 PROCEDURE — 6370000000 HC RX 637 (ALT 250 FOR IP)

## 2025-05-16 PROCEDURE — 80053 COMPREHEN METABOLIC PANEL: CPT

## 2025-05-16 PROCEDURE — 2500000003 HC RX 250 WO HCPCS: Performed by: STUDENT IN AN ORGANIZED HEALTH CARE EDUCATION/TRAINING PROGRAM

## 2025-05-16 PROCEDURE — G0378 HOSPITAL OBSERVATION PER HR: HCPCS

## 2025-05-16 PROCEDURE — 82800 BLOOD PH: CPT

## 2025-05-16 PROCEDURE — 0202U NFCT DS 22 TRGT SARS-COV-2: CPT

## 2025-05-16 PROCEDURE — 99285 EMERGENCY DEPT VISIT HI MDM: CPT

## 2025-05-16 PROCEDURE — 85025 COMPLETE CBC W/AUTO DIFF WBC: CPT

## 2025-05-16 PROCEDURE — 84484 ASSAY OF TROPONIN QUANT: CPT

## 2025-05-16 PROCEDURE — 6360000002 HC RX W HCPCS

## 2025-05-16 PROCEDURE — 96365 THER/PROPH/DIAG IV INF INIT: CPT

## 2025-05-16 PROCEDURE — 99223 1ST HOSP IP/OBS HIGH 75: CPT | Performed by: INTERNAL MEDICINE

## 2025-05-16 PROCEDURE — 36415 COLL VENOUS BLD VENIPUNCTURE: CPT

## 2025-05-16 PROCEDURE — 1200000000 HC SEMI PRIVATE

## 2025-05-16 PROCEDURE — 94640 AIRWAY INHALATION TREATMENT: CPT

## 2025-05-16 PROCEDURE — 96375 TX/PRO/DX INJ NEW DRUG ADDON: CPT

## 2025-05-16 PROCEDURE — 82947 ASSAY GLUCOSE BLOOD QUANT: CPT

## 2025-05-16 PROCEDURE — 94761 N-INVAS EAR/PLS OXIMETRY MLT: CPT

## 2025-05-16 PROCEDURE — 2500000003 HC RX 250 WO HCPCS

## 2025-05-16 PROCEDURE — 96367 TX/PROPH/DG ADDL SEQ IV INF: CPT

## 2025-05-16 PROCEDURE — 83735 ASSAY OF MAGNESIUM: CPT

## 2025-05-16 PROCEDURE — 83690 ASSAY OF LIPASE: CPT

## 2025-05-16 PROCEDURE — 6370000000 HC RX 637 (ALT 250 FOR IP): Performed by: STUDENT IN AN ORGANIZED HEALTH CARE EDUCATION/TRAINING PROGRAM

## 2025-05-16 PROCEDURE — 82805 BLOOD GASES W/O2 SATURATION: CPT

## 2025-05-16 PROCEDURE — 71045 X-RAY EXAM CHEST 1 VIEW: CPT

## 2025-05-16 PROCEDURE — 83880 ASSAY OF NATRIURETIC PEPTIDE: CPT

## 2025-05-16 PROCEDURE — 87636 SARSCOV2 & INF A&B AMP PRB: CPT

## 2025-05-16 PROCEDURE — 2580000003 HC RX 258

## 2025-05-16 PROCEDURE — 85610 PROTHROMBIN TIME: CPT

## 2025-05-16 PROCEDURE — 6360000002 HC RX W HCPCS: Performed by: STUDENT IN AN ORGANIZED HEALTH CARE EDUCATION/TRAINING PROGRAM

## 2025-05-16 PROCEDURE — 84145 PROCALCITONIN (PCT): CPT

## 2025-05-16 PROCEDURE — 96366 THER/PROPH/DIAG IV INF ADDON: CPT

## 2025-05-16 PROCEDURE — 96376 TX/PRO/DX INJ SAME DRUG ADON: CPT

## 2025-05-16 PROCEDURE — 93005 ELECTROCARDIOGRAM TRACING: CPT | Performed by: STUDENT IN AN ORGANIZED HEALTH CARE EDUCATION/TRAINING PROGRAM

## 2025-05-16 RX ORDER — GUAIFENESIN 600 MG/1
600 TABLET, EXTENDED RELEASE ORAL 2 TIMES DAILY
Status: DISCONTINUED | OUTPATIENT
Start: 2025-05-16 | End: 2025-05-17 | Stop reason: HOSPADM

## 2025-05-16 RX ORDER — IPRATROPIUM BROMIDE AND ALBUTEROL SULFATE 2.5; .5 MG/3ML; MG/3ML
1 SOLUTION RESPIRATORY (INHALATION)
Status: DISCONTINUED | OUTPATIENT
Start: 2025-05-16 | End: 2025-05-16

## 2025-05-16 RX ORDER — MAGNESIUM SULFATE HEPTAHYDRATE 40 MG/ML
2000 INJECTION, SOLUTION INTRAVENOUS ONCE
Status: COMPLETED | OUTPATIENT
Start: 2025-05-16 | End: 2025-05-16

## 2025-05-16 RX ORDER — POTASSIUM CHLORIDE 7.45 MG/ML
10 INJECTION INTRAVENOUS PRN
Status: DISCONTINUED | OUTPATIENT
Start: 2025-05-16 | End: 2025-05-17 | Stop reason: HOSPADM

## 2025-05-16 RX ORDER — ACETAMINOPHEN 650 MG/1
650 SUPPOSITORY RECTAL EVERY 6 HOURS PRN
Status: DISCONTINUED | OUTPATIENT
Start: 2025-05-16 | End: 2025-05-17 | Stop reason: HOSPADM

## 2025-05-16 RX ORDER — POTASSIUM CHLORIDE 1500 MG/1
40 TABLET, EXTENDED RELEASE ORAL PRN
Status: DISCONTINUED | OUTPATIENT
Start: 2025-05-16 | End: 2025-05-17 | Stop reason: HOSPADM

## 2025-05-16 RX ORDER — ALBUTEROL SULFATE 0.83 MG/ML
2.5 SOLUTION RESPIRATORY (INHALATION) 4 TIMES DAILY PRN
Status: DISCONTINUED | OUTPATIENT
Start: 2025-05-16 | End: 2025-05-17 | Stop reason: HOSPADM

## 2025-05-16 RX ORDER — RANOLAZINE 500 MG/1
1000 TABLET, EXTENDED RELEASE ORAL 2 TIMES DAILY
Status: DISCONTINUED | OUTPATIENT
Start: 2025-05-16 | End: 2025-05-17 | Stop reason: HOSPADM

## 2025-05-16 RX ORDER — BUDESONIDE, GLYCOPYRROLATE, AND FORMOTEROL FUMARATE 160; 9; 4.8 UG/1; UG/1; UG/1
2 AEROSOL, METERED RESPIRATORY (INHALATION) 2 TIMES DAILY
COMMUNITY

## 2025-05-16 RX ORDER — ONDANSETRON 2 MG/ML
4 INJECTION INTRAMUSCULAR; INTRAVENOUS EVERY 6 HOURS PRN
Status: DISCONTINUED | OUTPATIENT
Start: 2025-05-16 | End: 2025-05-17 | Stop reason: HOSPADM

## 2025-05-16 RX ORDER — CYCLOBENZAPRINE HCL 10 MG
5 TABLET ORAL 2 TIMES DAILY PRN
Status: DISCONTINUED | OUTPATIENT
Start: 2025-05-16 | End: 2025-05-17 | Stop reason: HOSPADM

## 2025-05-16 RX ORDER — SODIUM CHLORIDE 0.9 % (FLUSH) 0.9 %
5-40 SYRINGE (ML) INJECTION EVERY 12 HOURS SCHEDULED
Status: DISCONTINUED | OUTPATIENT
Start: 2025-05-16 | End: 2025-05-17 | Stop reason: HOSPADM

## 2025-05-16 RX ORDER — VARENICLINE TARTRATE 1 MG/1
1 TABLET, FILM COATED ORAL DAILY
Status: DISCONTINUED | OUTPATIENT
Start: 2025-05-16 | End: 2025-05-16 | Stop reason: RX

## 2025-05-16 RX ORDER — ASPIRIN 81 MG/1
81 TABLET ORAL DAILY
Status: DISCONTINUED | OUTPATIENT
Start: 2025-05-16 | End: 2025-05-17 | Stop reason: HOSPADM

## 2025-05-16 RX ORDER — DEXTROSE MONOHYDRATE 100 MG/ML
INJECTION, SOLUTION INTRAVENOUS CONTINUOUS PRN
Status: DISCONTINUED | OUTPATIENT
Start: 2025-05-16 | End: 2025-05-17 | Stop reason: HOSPADM

## 2025-05-16 RX ORDER — ACETAMINOPHEN 325 MG/1
650 TABLET ORAL EVERY 6 HOURS PRN
Status: DISCONTINUED | OUTPATIENT
Start: 2025-05-16 | End: 2025-05-17 | Stop reason: HOSPADM

## 2025-05-16 RX ORDER — PREDNISONE 10 MG/1
TABLET ORAL
COMMUNITY
Start: 2025-05-12 | End: 2025-05-22

## 2025-05-16 RX ORDER — INSULIN LISPRO 100 [IU]/ML
0-8 INJECTION, SOLUTION INTRAVENOUS; SUBCUTANEOUS
Status: DISCONTINUED | OUTPATIENT
Start: 2025-05-16 | End: 2025-05-17 | Stop reason: HOSPADM

## 2025-05-16 RX ORDER — IPRATROPIUM BROMIDE AND ALBUTEROL SULFATE 2.5; .5 MG/3ML; MG/3ML
1 SOLUTION RESPIRATORY (INHALATION) EVERY 20 MIN
Status: DISCONTINUED | OUTPATIENT
Start: 2025-05-16 | End: 2025-05-16

## 2025-05-16 RX ORDER — DOXYCYCLINE HYCLATE 100 MG
100 TABLET ORAL 2 TIMES DAILY
COMMUNITY
Start: 2025-05-12 | End: 2025-05-22

## 2025-05-16 RX ORDER — PANTOPRAZOLE SODIUM 40 MG/1
40 TABLET, DELAYED RELEASE ORAL
Status: DISCONTINUED | OUTPATIENT
Start: 2025-05-17 | End: 2025-05-17 | Stop reason: HOSPADM

## 2025-05-16 RX ORDER — TAMSULOSIN HYDROCHLORIDE 0.4 MG/1
0.4 CAPSULE ORAL 2 TIMES DAILY
Status: DISCONTINUED | OUTPATIENT
Start: 2025-05-16 | End: 2025-05-17 | Stop reason: HOSPADM

## 2025-05-16 RX ORDER — METFORMIN HYDROCHLORIDE 500 MG/1
500 TABLET, EXTENDED RELEASE ORAL 2 TIMES DAILY WITH MEALS
COMMUNITY

## 2025-05-16 RX ORDER — NITROGLYCERIN 0.4 MG/1
0.4 TABLET SUBLINGUAL EVERY 5 MIN PRN
Status: DISCONTINUED | OUTPATIENT
Start: 2025-05-16 | End: 2025-05-17 | Stop reason: HOSPADM

## 2025-05-16 RX ORDER — SODIUM CHLORIDE 0.9 % (FLUSH) 0.9 %
5-40 SYRINGE (ML) INJECTION PRN
Status: DISCONTINUED | OUTPATIENT
Start: 2025-05-16 | End: 2025-05-17 | Stop reason: HOSPADM

## 2025-05-16 RX ORDER — IPRATROPIUM BROMIDE AND ALBUTEROL SULFATE 2.5; .5 MG/3ML; MG/3ML
1 SOLUTION RESPIRATORY (INHALATION)
Status: DISCONTINUED | OUTPATIENT
Start: 2025-05-16 | End: 2025-05-17 | Stop reason: HOSPADM

## 2025-05-16 RX ORDER — ONDANSETRON 4 MG/1
4 TABLET, ORALLY DISINTEGRATING ORAL EVERY 8 HOURS PRN
Status: DISCONTINUED | OUTPATIENT
Start: 2025-05-16 | End: 2025-05-17 | Stop reason: HOSPADM

## 2025-05-16 RX ORDER — SODIUM CHLORIDE 9 MG/ML
INJECTION, SOLUTION INTRAVENOUS PRN
Status: DISCONTINUED | OUTPATIENT
Start: 2025-05-16 | End: 2025-05-17 | Stop reason: HOSPADM

## 2025-05-16 RX ORDER — MAGNESIUM SULFATE HEPTAHYDRATE 40 MG/ML
2000 INJECTION, SOLUTION INTRAVENOUS PRN
Status: DISCONTINUED | OUTPATIENT
Start: 2025-05-16 | End: 2025-05-17 | Stop reason: HOSPADM

## 2025-05-16 RX ORDER — POLYETHYLENE GLYCOL 3350 17 G/17G
17 POWDER, FOR SOLUTION ORAL DAILY PRN
Status: DISCONTINUED | OUTPATIENT
Start: 2025-05-16 | End: 2025-05-17 | Stop reason: HOSPADM

## 2025-05-16 RX ORDER — METOPROLOL SUCCINATE 25 MG/1
25 TABLET, EXTENDED RELEASE ORAL NIGHTLY
Status: DISCONTINUED | OUTPATIENT
Start: 2025-05-16 | End: 2025-05-17 | Stop reason: HOSPADM

## 2025-05-16 RX ADMIN — IPRATROPIUM BROMIDE AND ALBUTEROL SULFATE 1 DOSE: 2.5; .5 SOLUTION RESPIRATORY (INHALATION) at 12:16

## 2025-05-16 RX ADMIN — INSULIN LISPRO 4 UNITS: 100 INJECTION, SOLUTION INTRAVENOUS; SUBCUTANEOUS at 16:51

## 2025-05-16 RX ADMIN — RANOLAZINE 1000 MG: 500 TABLET, EXTENDED RELEASE ORAL at 20:39

## 2025-05-16 RX ADMIN — APIXABAN 5 MG: 5 TABLET, FILM COATED ORAL at 10:20

## 2025-05-16 RX ADMIN — APIXABAN 5 MG: 5 TABLET, FILM COATED ORAL at 20:39

## 2025-05-16 RX ADMIN — TAMSULOSIN HYDROCHLORIDE 0.4 MG: 0.4 CAPSULE ORAL at 10:36

## 2025-05-16 RX ADMIN — WATER 40 MG: 1 INJECTION INTRAMUSCULAR; INTRAVENOUS; SUBCUTANEOUS at 20:39

## 2025-05-16 RX ADMIN — WATER 125 MG: 1 INJECTION INTRAMUSCULAR; INTRAVENOUS; SUBCUTANEOUS at 06:51

## 2025-05-16 RX ADMIN — AZITHROMYCIN MONOHYDRATE 500 MG: 500 INJECTION, POWDER, LYOPHILIZED, FOR SOLUTION INTRAVENOUS at 10:25

## 2025-05-16 RX ADMIN — IPRATROPIUM BROMIDE AND ALBUTEROL SULFATE 1 DOSE: 2.5; .5 SOLUTION RESPIRATORY (INHALATION) at 18:56

## 2025-05-16 RX ADMIN — METOPROLOL SUCCINATE 25 MG: 25 TABLET, FILM COATED, EXTENDED RELEASE ORAL at 20:39

## 2025-05-16 RX ADMIN — GUAIFENESIN 600 MG: 600 TABLET, EXTENDED RELEASE ORAL at 20:39

## 2025-05-16 RX ADMIN — INSULIN LISPRO 2 UNITS: 100 INJECTION, SOLUTION INTRAVENOUS; SUBCUTANEOUS at 11:13

## 2025-05-16 RX ADMIN — INSULIN LISPRO 4 UNITS: 100 INJECTION, SOLUTION INTRAVENOUS; SUBCUTANEOUS at 20:41

## 2025-05-16 RX ADMIN — GUAIFENESIN 600 MG: 600 TABLET, EXTENDED RELEASE ORAL at 10:20

## 2025-05-16 RX ADMIN — IPRATROPIUM BROMIDE AND ALBUTEROL SULFATE 1 DOSE: .5; 2.5 SOLUTION RESPIRATORY (INHALATION) at 06:41

## 2025-05-16 RX ADMIN — SODIUM CHLORIDE, PRESERVATIVE FREE 10 ML: 5 INJECTION INTRAVENOUS at 21:02

## 2025-05-16 RX ADMIN — ASPIRIN 81 MG: 81 TABLET, COATED ORAL at 10:20

## 2025-05-16 RX ADMIN — TAMSULOSIN HYDROCHLORIDE 0.4 MG: 0.4 CAPSULE ORAL at 20:39

## 2025-05-16 RX ADMIN — POTASSIUM BICARBONATE 40 MEQ: 782 TABLET, EFFERVESCENT ORAL at 07:15

## 2025-05-16 RX ADMIN — IPRATROPIUM BROMIDE AND ALBUTEROL SULFATE 1 DOSE: 2.5; .5 SOLUTION RESPIRATORY (INHALATION) at 15:35

## 2025-05-16 RX ADMIN — ISOSORBIDE MONONITRATE 90 MG: 60 TABLET, EXTENDED RELEASE ORAL at 10:22

## 2025-05-16 RX ADMIN — RANOLAZINE 1000 MG: 500 TABLET, EXTENDED RELEASE ORAL at 10:20

## 2025-05-16 RX ADMIN — MAGNESIUM SULFATE HEPTAHYDRATE 2000 MG: 40 INJECTION, SOLUTION INTRAVENOUS at 07:19

## 2025-05-16 ASSESSMENT — LIFESTYLE VARIABLES
HOW OFTEN DO YOU HAVE A DRINK CONTAINING ALCOHOL: NEVER
HOW MANY STANDARD DRINKS CONTAINING ALCOHOL DO YOU HAVE ON A TYPICAL DAY: PATIENT DOES NOT DRINK

## 2025-05-16 ASSESSMENT — PAIN - FUNCTIONAL ASSESSMENT: PAIN_FUNCTIONAL_ASSESSMENT: NONE - DENIES PAIN

## 2025-05-16 ASSESSMENT — ENCOUNTER SYMPTOMS
EYE DISCHARGE: 0
VOMITING: 0
NAUSEA: 0
WHEEZING: 0
SORE THROAT: 0
CHEST TIGHTNESS: 1
ABDOMINAL PAIN: 0
SHORTNESS OF BREATH: 1
RHINORRHEA: 0
DIARRHEA: 0
EYE REDNESS: 0
CHEST TIGHTNESS: 0
CONSTIPATION: 0
COUGH: 1

## 2025-05-16 NOTE — ED NOTES
Report given to , RN from Meds Surg.   Report method by phone   The following was reviewed with receiving RN:   Current vital signs:  /63   Pulse 60   Temp 98 °F (36.7 °C) (Oral)   Resp 16   Ht 1.778 m (5' 10\")   Wt 91.6 kg (202 lb)   SpO2 98%   BMI 28.98 kg/m²                MEWS Score: 1     Any medication or safety alerts were reviewed. Any pending diagnostics and notifications were also reviewed, as well as any safety concerns or issues, abnormal labs, abnormal imaging, and abnormal assessment findings. Questions were answered.

## 2025-05-16 NOTE — H&P
[facility]  IN-PATIENT SERVICE  [facility]  IN-PATIENT SERVICE   [facility]     HISTORY AND PHYSICAL EXAMINATION            Date:   5/16/2025  Patient name:  Irwin Castro  Date of admission:  5/16/2025  6:12 AM  MRN:   [id number]  Account:  [id number]  YOB: 1950  PCP:    Waleska Castillo APRN - CNP  Room:   05/05  Code Status:    Full Code    Chief Complaint:     Chief Complaint   Patient presents with    Shortness of Breath    Chest Pain     History Obtained From:   Patient    History of Present Illness:   Irwin Castro is a 74 y.o. male patient with PMH of COPD active smoker, CAD s/p drug-eluting stent, atrial flutter on Eliquis, sick sinus syndrome with pacemaker in place, HFrEF with EF 45 to 50%, T2DM, who presented to the ED on 5/16/2025 with 1 week history of cough, SOB, chest tightness.  Patient states he saw his outpatient pulmonologist Dr. Meyer who prescribed him prednisone and doxycycline, he states this did not really change any of his symptoms.  Patient then states that earlier the morning of admission his symptoms acutely worsened, he does state he was under a lot of stress yesterday and believes this may have contributed to his symptoms.  Patient denies any fever, chills, chest pain, nausea, vomiting, abdominal pain, dysuria, hematuria, swelling of the legs, or any recent travel.    In the ED, patient immediately felt better after breathing treatment.  Workup was relatively negative, including CXR, WBC, COVID and flu test, troponin, proBNP.  Patient's potassium and magnesium were low and they were replaced as appropriate in the ED, he also received IV steroids.  VBG in the ED: pH 7.45, CO2 38.8, O2 31, HCO3 26.1.  Patient was not requiring any supplemental oxygen in the ED and was saturating well on room air.  We will plan to admit the patient for COPD exacerbation at this

## 2025-05-16 NOTE — PROGRESS NOTES
Physical Therapy        Physical Therapy Cancel Note      DATE: 2025    NAME: Irwin Castro  MRN: [id number]   : 1950      Patient not seen this date for Physical Therapy due to:    Patient independent with functional mobility. Will defer PT evaluation at this time. Please reorder PT if future needs arise.   Screen [phone]      Electronically signed by Ibeth De León PT on 2025 at 3:34 PM

## 2025-05-16 NOTE — ED PROVIDER NOTES
EMERGENCY DEPARTMENT ENCOUNTER    Pt Name: Irwin Castro  MRN: [id number]  Birthdate 1950  Date of evaluation: 5/16/25  CHIEF COMPLAINT       Chief Complaint   Patient presents with    Shortness of Breath    Chest Pain     HISTORY OF PRESENT ILLNESS   74-year-old history of hypertension, hyperlipidemia, diabetes presenting with shortness of breath    Shortness of breath cough    No chest pain.  No leg swelling recent travel or surgery no history of blood clots    Mild productive cough    No fevers or chills              REVIEW OF SYSTEMS     Review of Systems   Constitutional:  Negative for chills and fever.   HENT:  Negative for rhinorrhea and sore throat.    Eyes:  Negative for discharge and redness.   Respiratory:  Positive for cough and shortness of breath. Negative for chest tightness.    Cardiovascular:  Negative for chest pain.   Gastrointestinal:  Negative for abdominal pain, diarrhea, nausea and vomiting.   Genitourinary:  Negative for dysuria and frequency.   Musculoskeletal:  Negative for arthralgias and myalgias.   Skin:  Negative for rash.   Neurological:  Negative for weakness and numbness.   Psychiatric/Behavioral:  Negative for suicidal ideas.    All other systems reviewed and are negative.    PASTMEDICAL HISTORY     Past Medical History:   Diagnosis Date    Abnormal cardiovascular stress test     Anxiety disorder 08/31/2020    Atrial flutter (HCC) 05/17/2018    ELIQUIS    CAD (coronary artery disease)     Callus of foot     rt     COPD (chronic obstructive pulmonary disease) (HCC)     Former smoker     GERD (gastroesophageal reflux disease)     H/O cardiovascular stress test 09/2017    H/O non-A, non-B viral hepatitis     Hyperlipidemia     Hypertension     Marijuana use     MI (myocardial infarction) (HCC)     Obesity     Osteoarthritis     Pneumonia     Poor compliance     Rotator cuff tear     bilat    Shoulder pain, bilateral     Type II or unspecified type diabetes mellitus without mention  PRESCRIPTIONS:  New Prescriptions    No medications on file     PHYSICIAN CONSULTS ORDERED THIS ENCOUNTER:  IP CONSULT TO INTERNAL MEDICINE  FINAL IMPRESSION      1. COPD exacerbation (HCC)          DISPOSITION/PLAN   DISPOSITION Decision To Admit 05/16/2025 07:09:49 AM   DISPOSITION CONDITION Stable           OUTPATIENT FOLLOW UP THE PATIENT:  No follow-up provider specified.    MD Christina Corona Jeffrey, MD  05/16/25 [id number]

## 2025-05-16 NOTE — PROGRESS NOTES
Pharmacy Medication History Note      List of current medications patient is taking is complete.    Source of information: [employer], Care Everywhere, OARRS     Changes made to medication list:  Medications removed (include reason, ex. therapy complete or physician discontinued, noncompliance):  None     Medications flagged for provider review:  Cyclobenzaprine - course complete   Metformin IR - patient is taking ER formulation. Please remove this order.   Omeprazole - last filled in 2023    Medications added/doses adjusted:  Breztri 160-9-4.8 mcg/act inhale 2 puffs twice daily   Doxycycline 100 mg twice daily for 10 days   Metformin  mg twice daily   Prednisone 10 mg take 2 tablets daily for 5 days then take 1 tablet daily for 5 days   Chantix adjusted to 1 mg twice daily     Other notes (ex. Recent course of antibiotics, Coumadin dosing):  The patient started 10 day courses of doxycycline and prednisone on 5/12/25.   OARRS negative      Current Home Medication List at Time of Admission:  Prior to Admission medications    Medication Sig   doxycycline hyclate (VIBRA-TABS) 100 MG tablet Take 1 tablet by mouth 2 times daily   Budeson-Glycopyrrol-Formoterol (BREZTRI AEROSPHERE) 160-9-4.8 MCG/ACT AERO Inhale 2 puffs into the lungs in the morning and at bedtime   predniSONE (DELTASONE) 10 MG tablet Take 2 tablets by mouth daily for 5 days then take 1 tablet daily for 5 days   metFORMIN (GLUCOPHAGE-XR) 500 MG extended release tablet Take 1 tablet by mouth 2 times daily (with meals)   Evolocumab (REPATHA SURECLICK) 140 MG/ML SOAJ INJECT 1 PEN UNDER THE SKIN EVERY 2 WEEKS   ranolazine (RANEXA) 1000 MG extended release tablet Take 1 tablet by mouth 2 times daily   diphenhydrAMINE (BENADRYL) 25 MG tablet Take 1 tablet by mouth every 6 hours as needed for Itching   metoprolol succinate (TOPROL XL) 25 MG extended release tablet Take 1 tablet by mouth at bedtime   nitroGLYCERIN (NITROSTAT) 0.4 MG SL tablet Place 1

## 2025-05-16 NOTE — PLAN OF CARE
Problem: Chronic Conditions and Co-morbidities  Goal: Patient's chronic conditions and co-morbidity symptoms are monitored and maintained or improved  Outcome: Progressing  Flowsheets (Taken 5/16/2025 1437)  Care Plan - Patient's Chronic Conditions and Co-Morbidity Symptoms are Monitored and Maintained or Improved: Monitor and assess patient's chronic conditions and comorbid symptoms for stability, deterioration, or improvement     Problem: Discharge Planning  Goal: Discharge to home or other facility with appropriate resources  Outcome: Progressing  Flowsheets (Taken 5/16/2025 1437)  Discharge to home or other facility with appropriate resources: Identify barriers to discharge with patient and caregiver     Problem: ABCDS Injury Assessment  Goal: Absence of physical injury  Outcome: Progressing  Flowsheets (Taken 5/16/2025 1437)  Absence of Physical Injury: Implement safety measures based on patient assessment     Problem: Safety - Adult  Goal: Free from fall injury  Outcome: Progressing  Flowsheets (Taken 5/16/2025 1437)  Free From Fall Injury: Instruct family/caregiver on patient safety

## 2025-05-17 VITALS
BODY MASS INDEX: 28.92 KG/M2 | WEIGHT: 202 LBS | RESPIRATION RATE: 18 BRPM | HEART RATE: 74 BPM | OXYGEN SATURATION: 95 % | SYSTOLIC BLOOD PRESSURE: 143 MMHG | HEIGHT: 70 IN | DIASTOLIC BLOOD PRESSURE: 85 MMHG | TEMPERATURE: 97.9 F

## 2025-05-17 LAB
ANION GAP SERPL CALCULATED.3IONS-SCNC: 8 MMOL/L (ref 9–16)
BASOPHILS # BLD: <0.03 K/UL (ref 0–0.2)
BASOPHILS NFR BLD: 0 % (ref 0–2)
BUN SERPL-MCNC: 13 MG/DL (ref 8–23)
CALCIUM SERPL-MCNC: 9.5 MG/DL (ref 8.6–10.4)
CHLORIDE SERPL-SCNC: 102 MMOL/L (ref 98–107)
CO2 SERPL-SCNC: 28 MMOL/L (ref 20–31)
CREAT SERPL-MCNC: 0.7 MG/DL (ref 0.7–1.2)
EOSINOPHIL # BLD: <0.03 K/UL (ref 0–0.44)
EOSINOPHILS RELATIVE PERCENT: 0 % (ref 0–4)
ERYTHROCYTE [DISTWIDTH] IN BLOOD BY AUTOMATED COUNT: 13.6 % (ref 11.5–14.9)
GFR, ESTIMATED: >90 ML/MIN/1.73M2
GLUCOSE BLD-MCNC: 197 MG/DL (ref 75–110)
GLUCOSE BLD-MCNC: 229 MG/DL (ref 75–110)
GLUCOSE SERPL-MCNC: 192 MG/DL (ref 74–99)
HCT VFR BLD AUTO: 44.6 % (ref 41–53)
HGB BLD-MCNC: 15.2 G/DL (ref 13.5–17.5)
IMM GRANULOCYTES # BLD AUTO: 0.09 K/UL (ref 0–0.3)
IMM GRANULOCYTES NFR BLD: 1 %
LYMPHOCYTES NFR BLD: 1.19 K/UL (ref 1.1–3.7)
LYMPHOCYTES RELATIVE PERCENT: 10 % (ref 24–44)
MCH RBC QN AUTO: 31.9 PG (ref 26–34)
MCHC RBC AUTO-ENTMCNC: 34.1 G/DL (ref 31–37)
MCV RBC AUTO: 93.7 FL (ref 80–100)
MONOCYTES NFR BLD: 0.57 K/UL (ref 0.1–1.2)
MONOCYTES NFR BLD: 5 % (ref 3–12)
NEUTROPHILS NFR BLD: 84 % (ref 36–66)
NEUTS SEG NFR BLD: 9.58 K/UL (ref 1.5–8.1)
NRBC BLD-RTO: 0 PER 100 WBC
PLATELET # BLD AUTO: 168 K/UL (ref 150–450)
PMV BLD AUTO: 8.8 FL (ref 8–13.5)
POTASSIUM SERPL-SCNC: 4.5 MMOL/L (ref 3.7–5.3)
RBC # BLD AUTO: 4.76 M/UL (ref 4.21–5.77)
SODIUM SERPL-SCNC: 138 MMOL/L (ref 136–145)
WBC OTHER # BLD: 11.4 K/UL (ref 3.5–11)

## 2025-05-17 PROCEDURE — 85025 COMPLETE CBC W/AUTO DIFF WBC: CPT

## 2025-05-17 PROCEDURE — G0378 HOSPITAL OBSERVATION PER HR: HCPCS

## 2025-05-17 PROCEDURE — 6360000002 HC RX W HCPCS

## 2025-05-17 PROCEDURE — 94761 N-INVAS EAR/PLS OXIMETRY MLT: CPT

## 2025-05-17 PROCEDURE — 94640 AIRWAY INHALATION TREATMENT: CPT

## 2025-05-17 PROCEDURE — 6370000000 HC RX 637 (ALT 250 FOR IP)

## 2025-05-17 PROCEDURE — 80048 BASIC METABOLIC PNL TOTAL CA: CPT

## 2025-05-17 PROCEDURE — 2580000003 HC RX 258

## 2025-05-17 PROCEDURE — 99239 HOSP IP/OBS DSCHRG MGMT >30: CPT | Performed by: INTERNAL MEDICINE

## 2025-05-17 PROCEDURE — 96366 THER/PROPH/DIAG IV INF ADDON: CPT

## 2025-05-17 PROCEDURE — 2500000003 HC RX 250 WO HCPCS

## 2025-05-17 PROCEDURE — 96376 TX/PRO/DX INJ SAME DRUG ADON: CPT

## 2025-05-17 PROCEDURE — 82947 ASSAY GLUCOSE BLOOD QUANT: CPT

## 2025-05-17 PROCEDURE — 36415 COLL VENOUS BLD VENIPUNCTURE: CPT

## 2025-05-17 PROCEDURE — 99223 1ST HOSP IP/OBS HIGH 75: CPT | Performed by: NURSE PRACTITIONER

## 2025-05-17 RX ORDER — AZITHROMYCIN 500 MG/1
500 TABLET, FILM COATED ORAL DAILY
Qty: 1 TABLET | Refills: 0 | Status: SHIPPED | OUTPATIENT
Start: 2025-05-18 | End: 2025-05-19

## 2025-05-17 RX ORDER — AZITHROMYCIN 500 MG/1
500 TABLET, FILM COATED ORAL DAILY
Qty: 1 TABLET | Refills: 0 | Status: SHIPPED | OUTPATIENT
Start: 2025-05-18 | End: 2025-05-17

## 2025-05-17 RX ORDER — PREDNISONE 20 MG/1
20 TABLET ORAL 2 TIMES DAILY
Qty: 10 TABLET | Refills: 0 | Status: CANCELLED | OUTPATIENT
Start: 2025-05-17 | End: 2025-05-22

## 2025-05-17 RX ADMIN — AZITHROMYCIN MONOHYDRATE 500 MG: 500 INJECTION, POWDER, LYOPHILIZED, FOR SOLUTION INTRAVENOUS at 09:21

## 2025-05-17 RX ADMIN — TAMSULOSIN HYDROCHLORIDE 0.4 MG: 0.4 CAPSULE ORAL at 08:08

## 2025-05-17 RX ADMIN — IPRATROPIUM BROMIDE AND ALBUTEROL SULFATE 1 DOSE: 2.5; .5 SOLUTION RESPIRATORY (INHALATION) at 10:56

## 2025-05-17 RX ADMIN — ASPIRIN 81 MG: 81 TABLET, COATED ORAL at 08:08

## 2025-05-17 RX ADMIN — WATER 40 MG: 1 INJECTION INTRAMUSCULAR; INTRAVENOUS; SUBCUTANEOUS at 08:08

## 2025-05-17 RX ADMIN — PANTOPRAZOLE SODIUM 40 MG: 40 TABLET, DELAYED RELEASE ORAL at 07:24

## 2025-05-17 RX ADMIN — GUAIFENESIN 600 MG: 600 TABLET, EXTENDED RELEASE ORAL at 08:08

## 2025-05-17 RX ADMIN — APIXABAN 5 MG: 5 TABLET, FILM COATED ORAL at 08:08

## 2025-05-17 RX ADMIN — SODIUM CHLORIDE, PRESERVATIVE FREE 10 ML: 5 INJECTION INTRAVENOUS at 08:09

## 2025-05-17 RX ADMIN — IPRATROPIUM BROMIDE AND ALBUTEROL SULFATE 1 DOSE: 2.5; .5 SOLUTION RESPIRATORY (INHALATION) at 06:54

## 2025-05-17 RX ADMIN — INSULIN LISPRO 2 UNITS: 100 INJECTION, SOLUTION INTRAVENOUS; SUBCUTANEOUS at 07:23

## 2025-05-17 RX ADMIN — RANOLAZINE 1000 MG: 500 TABLET, EXTENDED RELEASE ORAL at 08:08

## 2025-05-17 RX ADMIN — ISOSORBIDE MONONITRATE 90 MG: 60 TABLET, EXTENDED RELEASE ORAL at 08:08

## 2025-05-17 ASSESSMENT — ENCOUNTER SYMPTOMS
ABDOMINAL PAIN: 0
COUGH: 0
CONSTIPATION: 0
SHORTNESS OF BREATH: 0
SORE THROAT: 0
DIARRHEA: 0
RHINORRHEA: 0

## 2025-05-17 NOTE — PROGRESS NOTES
AVS printed, gone over at this time. All questions answer. IV removed and tip intact, no complications. Patient wanted to eat lunch before leaving, instructed patient to call out when ready to go.

## 2025-05-17 NOTE — PLAN OF CARE
Problem: Chronic Conditions and Co-morbidities  Goal: Patient's chronic conditions and co-morbidity symptoms are monitored and maintained or improved  5/17/2025 1217 by Cheyanne Oliveros RN  Outcome: Adequate for Discharge  5/17/2025 0139 by Shayne Gimenez RN  Outcome: Progressing     Problem: Discharge Planning  Goal: Discharge to home or other facility with appropriate resources  5/17/2025 1217 by Cheyanne Oliveros RN  Outcome: Adequate for Discharge  5/17/2025 0139 by Shayne Gimenez RN  Outcome: Progressing     Problem: ABCDS Injury Assessment  Goal: Absence of physical injury  5/17/2025 1217 by Cheyanne Oliveros RN  Outcome: Adequate for Discharge  5/17/2025 0139 by Shayne Gimenez RN  Outcome: Progressing     Problem: Safety - Adult  Goal: Free from fall injury  5/17/2025 1217 by Cheyanne Oliveros RN  Outcome: Adequate for Discharge  5/17/2025 0139 by Shayne Gimenez RN  Outcome: Progressing

## 2025-05-17 NOTE — PROGRESS NOTES
[facility]   OCCUPATIONAL THERAPY MISSED TREATMENT NOTE   INPATIENT   Date: 25  Patient Name: Irwin Castro       Room:   MRN: [id number]   Account #: [id number]    : 1950  (74 y.o.)  Gender: male     REASON FOR MISSED TREATMENT:  Pt reports that he is IND with self-care and mobility, has no concerns regarding home discharge.  No need for skilled OT services at this time.    -   OT being discontinued at this time. Patient functioning at Premorbid Level  No further needs  1132    Electronically signed by LEE Ricardo on 25 at 11:36 AM EDT

## 2025-05-17 NOTE — DISCHARGE SUMMARY
[facility]   IN-PATIENT SERVICE   [facility]    Discharge Summary     Patient ID: Irwin Castro  :  1950   MRN: [id number]     ACCOUNT:  [id number]   Patient's PCP: Waleska Castillo APRN - CNP  Admit Date: 2025   Discharge Date: 2025    Length of Stay: 1  Code Status:  Full Code  Admitting Physician: Karissa Fonseca MD  Discharge Physician: Deena Meek MD     Active Discharge Diagnoses:     Primary Problem  COPD exacerbation (HCC)      Hospital Problems  Active Hospital Problems    Diagnosis Date Noted    COPD exacerbation (HCC) [J44.1] 2025     Admission Condition:  fair     Discharged Condition: fair    Hospital Stay:     Hospital Course:  Irwin Castro is a 74 y.o. male who was admitted for the management of  COPD exacerbation (HCC), presented to ER with cough, shortness of breath, and chest tightness for the past few days patient was prescribed oral steroids and antibiotic by his pulmonologist outpatient. In the ED, patient received a breathing treatment and stated he felt significantly better. Workup was negative, CXR negative, no wheezing on examination. Patient started on IV steroids every 12 hours and azithromycin. Additionally, patient complained of chest tightness so cardiology was consulted given his history of CAD with a drug-eluting stent that was previously rethrombosed in the past.  No plans for intervention per cardiology, okay to discharge from their standpoint.  On the day of discharge, patient had no further complaints or concerns, he was breathing well on room air and reported significant improvement of his cough and shortness of breath.  Patient will be discharged with oral steroids and azithromycin.    Significant therapeutic interventions: IV steroids, IV antibiotics    Significant Diagnostic Studies:   Labs / Micro:  CBC:   Lab Results   Component Value Date/Time    WBC 11.4 2025 06:28 AM    RBC 4.76 2025

## 2025-05-17 NOTE — DISCHARGE INSTRUCTIONS
Follow up with your primary care physician 3 days  Follow-up with pulmonologist, Dr. Meyer in 3 days.  Continue taking steroids and azithromycin as prescribed.  Take all your medication as prescribed. If your prescription has refills, obtain the medication refills from the pharmacy before you run out. Seek medical attention if you run out of a medication you need and do not have any refills of.   Reasons to call your doctor or go to the ER: Cough, shortness of breath, chest pain, or any other concerning symptoms.

## 2025-05-17 NOTE — PROGRESS NOTES
[facility]  IN-PATIENT SERVICE  [facility]    PROGRESS NOTE             5/17/2025    7:41 AM    Name:   Irwin Castro  MRN:     [id number]     Acct:      [id number]   Room:   2062/2062-01   Day:  1  Admit Date:  5/16/2025  6:12 AM    PCP:  Waleska Castillo APRN - CNP  Code Status:  Full Code    Subjective:     C/C:   Chief Complaint   Patient presents with    Shortness of Breath    Chest Pain     Interval History Status: improved.    Patient seen and examined at bedside, hemodynamically stable.  Patient showing significant improvement since admission, remains on 40 mg Solu-Medrol every 12 hours at this time in addition to azithromycin.  Cardiology was consulted yesterday given chest discomfort in the setting of his significant history of CAD with previously thrombosed drug-eluting stent, appreciate their recommendations.  Otherwise, anticipate discharge later today.  WBC has increased overnight 8>11.4, no clear signs of infection at this time, RPP was negative, monitor closely.    Overnight events and plan discussed with nursing staff, no new concerns at this time    Brief History:   Irwin Castro is a 74 y.o. male patient with PMH of COPD active smoker, CAD s/p drug-eluting stent, atrial flutter on Eliquis, sick sinus syndrome with pacemaker in place, HFrEF with EF 45 to 50%, T2DM, who presented to the ED on 5/16/2025 with 1 week history of cough, SOB, chest tightness.  Patient states he saw his outpatient pulmonologist Dr. Meyer who prescribed him prednisone and doxycycline, he states this did not really change any of his symptoms.  Patient then states that earlier the morning of admission his symptoms acutely worsened, he does state he was under a lot of stress yesterday and believes this may have contributed to his symptoms.  Patient denies any fever, chills, chest pain, nausea, vomiting, abdominal pain, dysuria, hematuria, swelling of the legs, or any  recent travel.     In the ED, patient immediately felt better after breathing treatment.  Workup was relatively negative, including CXR, WBC, COVID and flu test, troponin, proBNP.  Patient's potassium and magnesium were low and they were replaced as appropriate in the ED, he also received IV steroids.  VBG in the ED: pH 7.45, CO2 38.8, O2 31, HCO3 26.1.  Patient was not requiring any supplemental oxygen in the ED and was saturating well on room air.  We will plan to admit the patient for COPD exacerbation at this time.     The patient was admitted to the hospital for management of acute on chronic respiratory failure likely secondary to COPD exacerbation.    Review of Systems:     Review of Systems   Constitutional:  Negative for chills and fever.   HENT:  Negative for rhinorrhea and sore throat.    Respiratory:  Negative for cough and shortness of breath.    Cardiovascular:  Negative for chest pain and palpitations.   Gastrointestinal:  Negative for abdominal pain, constipation and diarrhea.   Genitourinary:  Negative for dysuria and hematuria.   Neurological:  Negative for headaches.     Medications:     Allergies:    Allergies   Allergen Reactions    Symbicort [Budesonide-Formoterol Fumarate] Swelling     Patient reports throat swelling.    Advair Hfa [Fluticasone-Salmeterol] Other (See Comments)     Soreness of throat    Barium-Containing Compounds      \"had a reaction to a test, I think it was barium\"    Codeine Itching    Dye  [Iodides]      Other reaction(s): Wheezing  Red in color, hot, wheezing    Pravastatin      Statins cause severe muscle aches.    Statins Myalgia       Current Meds:   Scheduled Meds:    apixaban  5 mg Oral BID    ipratropium 0.5 mg-albuterol 2.5 mg  1 Dose Inhalation Q4H WA RT    guaiFENesin  600 mg Oral BID    aspirin  81 mg Oral Daily    isosorbide mononitrate  90 mg Oral Daily    metoprolol succinate  25 mg Oral Nightly    pantoprazole  40 mg Oral QAM AC    ranolazine  1,000 mg Oral  143/85   Pulse 69   Temp 97.9 °F (36.6 °C) (Oral)   Resp 16   Ht 1.778 m (5' 10\")   Wt 91.6 kg (202 lb)   SpO2 94%   BMI 28.98 kg/m²   Temp (24hrs), Av °F (36.7 °C), Min:97.8 °F (36.6 °C), Max:98.1 °F (36.7 °C)    Recent Labs     25  1032 25  1622 25  1959 25  0546   POCGLU 195* 284* 277* 197*       I/O(24Hr):  No intake or output data in the 24 hours ending 25 0741    Labs:    [unfilled]    Lab Results   Component Value Date/Time    SPECIAL NOT REPORTED 2021 08:32 PM     Lab Results   Component Value Date/Time    CULTURE KLEBSIELLA PNEUMONIAE >[id number] CFU/ML (A) 2023 08:27 PM       [unfilled]    Radiology:    XR CHEST PORTABLE  Result Date: 2025  EXAMINATION: ONE XRAY VIEW OF THE CHEST 2025 6:38 am COMPARISON: Chest radiograph 2020 HISTORY: ORDERING SYSTEM PROVIDED HISTORY: Shortness of Breath TECHNOLOGIST PROVIDED HISTORY: Shortness of Breath Reason for Exam: dyspnea FINDINGS: Left chest wall pacemaker.  No focal consolidation.  No pulmonary edema.  No pleural effusion or pneumothorax.  Normal cardiomediastinal silhouette.  No acute osseous abnormality.  Severe bilateral glenohumeral osteoarthritis.     No acute cardiopulmonary process.     Physical Examination:        Physical Exam  Constitutional:       Appearance: Normal appearance.   HENT:      Head: Normocephalic and atraumatic.      Nose: No congestion or rhinorrhea.   Cardiovascular:      Rate and Rhythm: Normal rate and regular rhythm.   Pulmonary:      Effort: Pulmonary effort is normal.      Breath sounds: Normal breath sounds. No rales.      Comments: Very minimal wheezing  Abdominal:      General: Abdomen is flat. There is no distension.      Palpations: Abdomen is soft.      Tenderness: There is no abdominal tenderness.   Musculoskeletal:      Right lower leg: No edema.      Left lower leg: No edema.   Skin:     Coloration: Skin is not jaundiced or pale.   Neurological:      Mental

## 2025-05-17 NOTE — CARE COORDINATION
Case Management Assessment  Initial Evaluation    Date/Time of Evaluation: 5/17/2025 11:23 AM  Assessment Completed by: ARIANA Lloyd, WILLIAM    If patient is discharged prior to next notation, then this note serves as note for discharge by case management.    Patient Name: Irwin Castro                   YOB: 1950  Diagnosis: COPD exacerbation (HCC) [J44.1]                   Date / Time: 5/16/2025  6:12 AM    Patient Admission Status: Inpatient   Readmission Risk (Low < 19, Mod (19-27), High > 27): Readmission Risk Score: 8.1    Current PCP: Waleska Castillo APRN - CNP  PCP verified by CM? Yes    Chart Reviewed: Yes      History Provided by: Patient  Patient Orientation: Alert and Oriented    Patient Cognition: Alert    Hospitalization in the last 30 days (Readmission):  No    If yes, Readmission Assessment in CM Navigator will be completed.    Advance Directives:      Code Status: Full Code   Patient's Primary Decision Maker is: Legal Next of Kin      Discharge Planning:    Patient lives with: Children Type of Home: House  Primary Care Giver: Self  Patient Support Systems include: Children, Family Members   Current Financial resources: Medicare, Medicaid  Current community resources: None  Current services prior to admission: None            Current DME:              Type of Home Care services:  None    ADLS  Prior functional level: Independent in ADLs/IADLs  Current functional level: Independent in ADLs/IADLs    PT AM-PAC:   /24  OT AM-PAC:   /24    Family can provide assistance at DC: No  Would you like Case Management to discuss the discharge plan with any other family members/significant others, and if so, who? No  Plans to Return to Present Housing: Yes  Other Identified Issues/Barriers to RETURNING to current housing: none  Potential Assistance needed at discharge: N/A            Potential DME:    Patient expects to discharge to: House  Plan for transportation at discharge:

## 2025-05-17 NOTE — PLAN OF CARE
Problem: Chronic Conditions and Co-morbidities  Goal: Patient's chronic conditions and co-morbidity symptoms are monitored and maintained or improved  5/17/2025 0139 by Shayne Gimenez RN  Outcome: Progressing  5/16/2025 1437 by Latrice Gottlieb RN  Outcome: Progressing  Flowsheets (Taken 5/16/2025 1437)  Care Plan - Patient's Chronic Conditions and Co-Morbidity Symptoms are Monitored and Maintained or Improved: Monitor and assess patient's chronic conditions and comorbid symptoms for stability, deterioration, or improvement     Problem: Discharge Planning  Goal: Discharge to home or other facility with appropriate resources  5/17/2025 0139 by Shayne Gimenez RN  Outcome: Progressing  5/16/2025 1437 by Latrice Gottlieb RN  Outcome: Progressing  Flowsheets (Taken 5/16/2025 1437)  Discharge to home or other facility with appropriate resources: Identify barriers to discharge with patient and caregiver     Problem: ABCDS Injury Assessment  Goal: Absence of physical injury  5/17/2025 0139 by Shayne Gimenez RN  Outcome: Progressing  5/16/2025 1437 by Latrice Gottlieb RN  Outcome: Progressing  Flowsheets (Taken 5/16/2025 1437)  Absence of Physical Injury: Implement safety measures based on patient assessment     Problem: Safety - Adult  Goal: Free from fall injury  5/17/2025 0139 by Shayne Gimenez RN  Outcome: Progressing  5/16/2025 1437 by Latrice Gottlieb RN  Outcome: Progressing  Flowsheets (Taken 5/16/2025 1437)  Free From Fall Injury: Instruct family/caregiver on patient safety

## 2025-05-20 LAB
EKG ATRIAL RATE: 62 BPM
EKG P-R INTERVAL: 216 MS
EKG Q-T INTERVAL: 478 MS
EKG QRS DURATION: 138 MS
EKG QTC CALCULATION (BAZETT): 485 MS
EKG R AXIS: -70 DEGREES
EKG T AXIS: 107 DEGREES
EKG VENTRICULAR RATE: 62 BPM

## 2025-05-27 ENCOUNTER — APPOINTMENT (OUTPATIENT)
Dept: CT IMAGING | Age: 75
End: 2025-05-27
Payer: MEDICARE

## 2025-05-27 ENCOUNTER — HOSPITAL ENCOUNTER (EMERGENCY)
Age: 75
Discharge: HOME | End: 2025-05-27
Attending: EMERGENCY MEDICINE
Payer: MEDICARE

## 2025-05-27 VITALS
HEART RATE: 79 BPM | SYSTOLIC BLOOD PRESSURE: 114 MMHG | DIASTOLIC BLOOD PRESSURE: 65 MMHG | WEIGHT: 200 LBS | RESPIRATION RATE: 18 BRPM | TEMPERATURE: 98.1 F | BODY MASS INDEX: 28.63 KG/M2 | HEIGHT: 70 IN | OXYGEN SATURATION: 97 %

## 2025-05-27 DIAGNOSIS — S39.012A STRAIN OF LUMBAR REGION, INITIAL ENCOUNTER: Primary | ICD-10-CM

## 2025-05-27 LAB
ALBUMIN SERPL-MCNC: 3.9 G/DL (ref 3.5–5.2)
ALP SERPL-CCNC: 59 U/L (ref 40–129)
ALT SERPL-CCNC: 17 U/L (ref 10–50)
ANION GAP SERPL CALCULATED.3IONS-SCNC: 11 MMOL/L (ref 9–16)
AST SERPL-CCNC: 19 U/L (ref 10–50)
BACTERIA URNS QL MICRO: ABNORMAL
BASOPHILS # BLD: <0.03 K/UL (ref 0–0.2)
BASOPHILS NFR BLD: 0 % (ref 0–2)
BILIRUB SERPL-MCNC: 0.6 MG/DL (ref 0–1.2)
BILIRUB UR QL STRIP: NEGATIVE
BUN SERPL-MCNC: 11 MG/DL (ref 8–23)
CALCIUM SERPL-MCNC: 9.5 MG/DL (ref 8.6–10.4)
CASTS #/AREA URNS LPF: ABNORMAL /LPF
CHLORIDE SERPL-SCNC: 104 MMOL/L (ref 98–107)
CLARITY UR: CLEAR
CO2 SERPL-SCNC: 27 MMOL/L (ref 20–31)
COLOR UR: YELLOW
CREAT SERPL-MCNC: 0.8 MG/DL (ref 0.7–1.2)
EOSINOPHIL # BLD: 0.05 K/UL (ref 0–0.44)
EOSINOPHILS RELATIVE PERCENT: 1 % (ref 0–4)
EPI CELLS #/AREA URNS HPF: ABNORMAL /HPF
ERYTHROCYTE [DISTWIDTH] IN BLOOD BY AUTOMATED COUNT: 13.8 % (ref 11.5–14.9)
GFR, ESTIMATED: >90 ML/MIN/1.73M2
GLUCOSE SERPL-MCNC: 127 MG/DL (ref 74–99)
GLUCOSE UR STRIP-MCNC: NEGATIVE MG/DL
HCT VFR BLD AUTO: 48.6 % (ref 41–53)
HGB BLD-MCNC: 16.6 G/DL (ref 13.5–17.5)
HGB UR QL STRIP.AUTO: NEGATIVE
IMM GRANULOCYTES # BLD AUTO: 0.06 K/UL (ref 0–0.3)
IMM GRANULOCYTES NFR BLD: 1 %
INR PPP: 1.1
KETONES UR STRIP-MCNC: ABNORMAL MG/DL
LEUKOCYTE ESTERASE UR QL STRIP: ABNORMAL
LIPASE SERPL-CCNC: 28 U/L (ref 13–60)
LYMPHOCYTES NFR BLD: 1.85 K/UL (ref 1.1–3.7)
LYMPHOCYTES RELATIVE PERCENT: 20 % (ref 24–44)
MCH RBC QN AUTO: 31.9 PG (ref 26–34)
MCHC RBC AUTO-ENTMCNC: 34.2 G/DL (ref 31–37)
MCV RBC AUTO: 93.3 FL (ref 80–100)
MONOCYTES NFR BLD: 0.81 K/UL (ref 0.1–1.2)
MONOCYTES NFR BLD: 9 % (ref 3–12)
NEUTROPHILS NFR BLD: 69 % (ref 36–66)
NEUTS SEG NFR BLD: 6.66 K/UL (ref 1.5–8.1)
NITRITE UR QL STRIP: NEGATIVE
NRBC BLD-RTO: 0 PER 100 WBC
PH UR STRIP: 5.5 [PH] (ref 5–8)
PLATELET # BLD AUTO: 169 K/UL (ref 150–450)
PMV BLD AUTO: 9.2 FL (ref 8–13.5)
POTASSIUM SERPL-SCNC: 3.6 MMOL/L (ref 3.7–5.3)
PROT SERPL-MCNC: 6.5 G/DL (ref 6.6–8.7)
PROT UR STRIP-MCNC: ABNORMAL MG/DL
PROTHROMBIN TIME: 15.5 SEC (ref 11.8–14.6)
RBC # BLD AUTO: 5.21 M/UL (ref 4.21–5.77)
RBC #/AREA URNS HPF: ABNORMAL /HPF
SODIUM SERPL-SCNC: 142 MMOL/L (ref 136–145)
SP GR UR STRIP: 1.02 (ref 1–1.03)
UROBILINOGEN UR STRIP-ACNC: NORMAL EU/DL (ref 0–1)
WBC #/AREA URNS HPF: ABNORMAL /HPF
WBC OTHER # BLD: 9.5 K/UL (ref 3.5–11)

## 2025-05-27 PROCEDURE — 85025 COMPLETE CBC W/AUTO DIFF WBC: CPT

## 2025-05-27 PROCEDURE — 83690 ASSAY OF LIPASE: CPT

## 2025-05-27 PROCEDURE — 36415 COLL VENOUS BLD VENIPUNCTURE: CPT

## 2025-05-27 PROCEDURE — 81001 URINALYSIS AUTO W/SCOPE: CPT

## 2025-05-27 PROCEDURE — 80053 COMPREHEN METABOLIC PANEL: CPT

## 2025-05-27 PROCEDURE — 99284 EMERGENCY DEPT VISIT MOD MDM: CPT

## 2025-05-27 PROCEDURE — 6370000000 HC RX 637 (ALT 250 FOR IP): Performed by: PHYSICIAN ASSISTANT

## 2025-05-27 PROCEDURE — 85610 PROTHROMBIN TIME: CPT

## 2025-05-27 PROCEDURE — 72131 CT LUMBAR SPINE W/O DYE: CPT

## 2025-05-27 RX ORDER — PREDNISONE 20 MG/1
20 TABLET ORAL DAILY
Qty: 5 TABLET | Refills: 0 | Status: SHIPPED | OUTPATIENT
Start: 2025-05-27 | End: 2025-06-01

## 2025-05-27 RX ORDER — HYDROCODONE BITARTRATE AND ACETAMINOPHEN 5; 325 MG/1; MG/1
1 TABLET ORAL EVERY 8 HOURS PRN
Qty: 9 TABLET | Refills: 0 | Status: SHIPPED | OUTPATIENT
Start: 2025-05-27 | End: 2025-05-30

## 2025-05-27 RX ORDER — LIDOCAINE 50 MG/G
1 PATCH TOPICAL DAILY
Qty: 10 PATCH | Refills: 0 | Status: SHIPPED | OUTPATIENT
Start: 2025-05-27 | End: 2025-06-06

## 2025-05-27 RX ORDER — PREDNISONE 20 MG/1
20 TABLET ORAL ONCE
Status: COMPLETED | OUTPATIENT
Start: 2025-05-27 | End: 2025-05-27

## 2025-05-27 RX ORDER — CYCLOBENZAPRINE HCL 10 MG
10 TABLET ORAL ONCE
Status: COMPLETED | OUTPATIENT
Start: 2025-05-27 | End: 2025-05-27

## 2025-05-27 RX ORDER — HYDROCODONE BITARTRATE AND ACETAMINOPHEN 5; 325 MG/1; MG/1
1 TABLET ORAL ONCE
Status: COMPLETED | OUTPATIENT
Start: 2025-05-27 | End: 2025-05-27

## 2025-05-27 RX ADMIN — CYCLOBENZAPRINE 10 MG: 10 TABLET, FILM COATED ORAL at 11:34

## 2025-05-27 RX ADMIN — PREDNISONE 20 MG: 20 TABLET ORAL at 11:34

## 2025-05-27 RX ADMIN — HYDROCODONE BITARTRATE AND ACETAMINOPHEN 1 TABLET: 5; 325 TABLET ORAL at 11:35

## 2025-05-27 ASSESSMENT — PAIN DESCRIPTION - ORIENTATION
ORIENTATION: LEFT;RIGHT
ORIENTATION: RIGHT;LEFT;LOWER

## 2025-05-27 ASSESSMENT — LIFESTYLE VARIABLES
HOW MANY STANDARD DRINKS CONTAINING ALCOHOL DO YOU HAVE ON A TYPICAL DAY: 5 OR 6
HOW OFTEN DO YOU HAVE A DRINK CONTAINING ALCOHOL: MONTHLY OR LESS

## 2025-05-27 ASSESSMENT — PAIN DESCRIPTION - DESCRIPTORS
DESCRIPTORS: DULL
DESCRIPTORS: SORE;BURNING

## 2025-05-27 ASSESSMENT — ENCOUNTER SYMPTOMS
BACK PAIN: 1
BOWEL INCONTINENCE: 0

## 2025-05-27 ASSESSMENT — PAIN DESCRIPTION - LOCATION
LOCATION: BACK;LEG;FOOT
LOCATION: BACK;LEG

## 2025-05-27 ASSESSMENT — PAIN SCALES - GENERAL
PAINLEVEL_OUTOF10: 8
PAINLEVEL_OUTOF10: 8

## 2025-05-27 ASSESSMENT — PAIN DESCRIPTION - PAIN TYPE: TYPE: CHRONIC PAIN

## 2025-05-27 ASSESSMENT — PAIN - FUNCTIONAL ASSESSMENT: PAIN_FUNCTIONAL_ASSESSMENT: 0-10

## 2025-05-27 NOTE — DISCHARGE INSTRUCTIONS
Rest on firm surface  Change position frequently  Heat or ice  See your doctor tomorrow  If your symptoms worsen or if you have other concerns return to the Emergency Department

## 2025-05-27 NOTE — ED PROVIDER NOTES
[facility] EMERGENCY DEPARTMENT  eMERGENCY dEPARTMENT eNCOUnter   Independent Attestation     Pt Name: Irwin Castro  MRN: [id number]  Birthdate 1950  Date of evaluation: 5/27/25   Irwin Castro is a 74 y.o. male who presents with Back Pain and Leg Pain (Pt c/o lower back and bilateral leg pain/discomfort 8/10 x 3 days )    Vitals:   Vitals:    05/27/25 1053   BP: 114/65   Pulse: 79   Resp: 18   Temp: 98.1 °F (36.7 °C)   TempSrc: Oral   SpO2: 97%   Weight: 90.7 kg (200 lb)   Height: 1.778 m (5' 10\")     Impression:   1. Strain of lumbar region, initial encounter      I was personally available for consultation in the Emergency Department. I have reviewed the chart and agree with the documentation as recorded by the MLP, including the assessment, treatment plan and disposition.  Basim Aggarwal MD  Attending Emergency  Physician                 Basim Aggarwal MD  05/27/25 7414    
Leukocyte Esterase, Urine TRACE (*)     All other components within normal limits   CBC WITH AUTO DIFFERENTIAL - Abnormal; Notable for the following components:    Neutrophils % 69 (*)     Lymphocytes % 20 (*)     Immature Granulocytes % 1 (*)     All other components within normal limits   COMPREHENSIVE METABOLIC PANEL - Abnormal; Notable for the following components:    Potassium 3.6 (*)     Glucose 127 (*)     Total Protein 6.5 (*)     All other components within normal limits   PROTIME-INR - Abnormal; Notable for the following components:    Protime 15.5 (*)     All other components within normal limits   MICROSCOPIC URINALYSIS - Abnormal; Notable for the following components:    WBC, UA 0 TO 2 (*)     Casts UA 0 TO 2 (*)     All other components within normal limits   LIPASE   SPECIMEN REJECTION         EMERGENCY DEPARTMENT COURSE:   Vitals:    Vitals:    05/27/25 1053   BP: 114/65   Pulse: 79   Resp: 18   Temp: 98.1 °F (36.7 °C)   TempSrc: Oral   SpO2: 97%   Weight: 90.7 kg (200 lb)   Height: 1.778 m (5' 10\")       The patient was given the following medications while in the emergency department:  Orders Placed This Encounter   Medications    HYDROcodone-acetaminophen (NORCO) 5-325 MG per tablet 1 tablet    predniSONE (DELTASONE) tablet 20 mg    cyclobenzaprine (FLEXERIL) tablet 10 mg    predniSONE (DELTASONE) 20 MG tablet     Sig: Take 1 tablet by mouth daily for 5 days     Dispense:  5 tablet     Refill:  0    lidocaine (LIDODERM) 5 %     Sig: Place 1 patch onto the skin daily for 10 days 12 hours on, 12 hours off.     Dispense:  10 patch     Refill:  0    HYDROcodone-acetaminophen (NORCO) 5-325 MG per tablet     Sig: Take 1 tablet by mouth every 8 hours as needed for Pain for up to 3 days. Intended supply: 3 days. Take lowest dose possible to manage pain Max Daily Amount: 3 tablets     Dispense:  9 tablet     Refill:  0       -------------------------      CRITICAL CARE:

## 2025-06-03 NOTE — PROGRESS NOTES
Physician Progress Note      PATIENT:               NIYAH SHEEHAN  CSN #:                  [id number]  :                       1950  ADMIT DATE:       2025 6:12 AM  DISCH DATE:        2025 12:46 PM  RESPONDING  PROVIDER #:        Karissa Fonseca MD          QUERY TEXT:    Acute respiratory failure is documented in the medical record (H&P by Karissa Fonseca MD at 2025). Please provide additional clinical indicators   supportive of your documentation. Or please document if the diagnosis of acute   respiratory failure has been ruled out after study.    The clinical indicators include:  -\"The patient was admitted to the hospital for management of acute on chronic   respiratory failure likely secondary to COPD exacerbation.\"(H&P by Karissa Fonseca MD at 2025)    -\" VBG in the ED: pH 7.45\"(Progress Notes by Karissa Fonseca MD at 2025)    -\"Normal excursion and expansion without use of accessory muscles\", \"alert,   cooperative, no distress and appears stated age\", \"SpO2 96%\" (Consults by   Lesley Delaney APRN - CNP at 2025)  Options provided:  -- Acute Respiratory Failure ruled in after study  -- Acute Respiratory Failure ruled out after study and Chronic Respiratory   Failure confirmed  -- Other - I will add my own diagnosis  -- Disagree - Not applicable / Not valid  -- Disagree - Clinically unable to determine / Unknown  -- Refer to Clinical Documentation Reviewer    PROVIDER RESPONSE TEXT:    Acute Respiratory Failure ruled out after study and Chronic Respiratory   Failure confirmed.    Query created by: Mehreen Bowman on 2025 12:28 PM      Electronically signed by:  Karissa Fonseca MD 6/3/2025 8:05 AM

## (undated) DEVICE — Device: Brand: DEFENDO VALVE AND CONNECTOR KIT

## (undated) DEVICE — GOWN ISOLATN XL YEL M WT SIDE TIE LEV 2

## (undated) DEVICE — 4-PORT MANIFOLD: Brand: NEPTUNE 2

## (undated) DEVICE — ADAPTER,CATHETER/SYRINGE/LUER,STERILE: Brand: MEDLINE

## (undated) DEVICE — JELLY,LUBE,STERILE,FLIP TOP,TUBE,2-OZ: Brand: MEDLINE

## (undated) DEVICE — TUBING, SUCTION, 3/16" X 10', STRAIGHT: Brand: MEDLINE

## (undated) DEVICE — CONNECTOR TBNG AUX H2O JET DISP FOR OLY 160/180 SER

## (undated) DEVICE — SNARE ENDOSCP L240CM LOOP W27MM SHTH DIA2.4MM WRK CHN 2.8MM

## (undated) DEVICE — SPONGE GZ W4XL4IN COT 4 PLY WVN

## (undated) DEVICE — SIMPLICITY FLUFF UNDERPAD 23X36, MODERATE: Brand: SIMPLICITY

## (undated) DEVICE — BASIN EMSIS 700ML GRAPHITE PLAS KID SHP GRAD